# Patient Record
Sex: FEMALE | Race: BLACK OR AFRICAN AMERICAN | Employment: FULL TIME | URBAN - METROPOLITAN AREA
[De-identification: names, ages, dates, MRNs, and addresses within clinical notes are randomized per-mention and may not be internally consistent; named-entity substitution may affect disease eponyms.]

---

## 2019-07-02 LAB
A/G RATIO: 1 (ref 1.1–2.2)
ALBUMIN SERPL-MCNC: 4.1 G/DL (ref 3.4–5)
ALP BLD-CCNC: 40 U/L (ref 40–129)
ALT SERPL-CCNC: 15 U/L (ref 10–40)
ANION GAP SERPL CALCULATED.3IONS-SCNC: 14 MMOL/L (ref 3–16)
AST SERPL-CCNC: 22 U/L (ref 15–37)
BILIRUB SERPL-MCNC: 0.4 MG/DL (ref 0–1)
BUN BLDV-MCNC: 8 MG/DL (ref 7–20)
CALCIUM SERPL-MCNC: 9.6 MG/DL (ref 8.3–10.6)
CHLORIDE BLD-SCNC: 100 MMOL/L (ref 99–110)
CO2: 24 MMOL/L (ref 21–32)
CREAT SERPL-MCNC: 0.6 MG/DL (ref 0.6–1.1)
GFR AFRICAN AMERICAN: >60
GFR NON-AFRICAN AMERICAN: >60
GLOBULIN: 4 G/DL
GLUCOSE BLD-MCNC: 75 MG/DL (ref 70–99)
HCT VFR BLD CALC: 38.9 % (ref 36–48)
HEMOGLOBIN: 12.7 G/DL (ref 12–16)
MCH RBC QN AUTO: 28.6 PG (ref 26–34)
MCHC RBC AUTO-ENTMCNC: 32.7 G/DL (ref 31–36)
MCV RBC AUTO: 87.5 FL (ref 80–100)
PDW BLD-RTO: 13.3 % (ref 12.4–15.4)
PLATELET # BLD: 211 K/UL (ref 135–450)
PMV BLD AUTO: 10.7 FL (ref 5–10.5)
POTASSIUM SERPL-SCNC: 4.5 MMOL/L (ref 3.5–5.1)
RBC # BLD: 4.45 M/UL (ref 4–5.2)
SEDIMENTATION RATE, ERYTHROCYTE: 19 MM/HR (ref 0–20)
SODIUM BLD-SCNC: 138 MMOL/L (ref 136–145)
TOTAL PROTEIN: 8.1 G/DL (ref 6.4–8.2)
TSH SERPL DL<=0.05 MIU/L-ACNC: 0.65 UIU/ML (ref 0.27–4.2)
WBC # BLD: 8.6 K/UL (ref 4–11)

## 2019-07-05 LAB
HPV COMMENT: NORMAL
HPV TYPE 16: NOT DETECTED
HPV TYPE 18: NOT DETECTED
HPVOH (OTHER TYPES): NOT DETECTED

## 2019-07-29 ENCOUNTER — OFFICE VISIT (OUTPATIENT)
Dept: INTERNAL MEDICINE CLINIC | Age: 29
End: 2019-07-29
Payer: COMMERCIAL

## 2019-07-29 VITALS
WEIGHT: 215 LBS | DIASTOLIC BLOOD PRESSURE: 60 MMHG | HEART RATE: 88 BPM | HEIGHT: 67 IN | OXYGEN SATURATION: 98 % | SYSTOLIC BLOOD PRESSURE: 112 MMHG | BODY MASS INDEX: 33.74 KG/M2

## 2019-07-29 DIAGNOSIS — R63.5 ABNORMAL WEIGHT GAIN: Primary | ICD-10-CM

## 2019-07-29 DIAGNOSIS — Z23 NEED FOR TDAP VACCINATION: ICD-10-CM

## 2019-07-29 PROCEDURE — 90715 TDAP VACCINE 7 YRS/> IM: CPT | Performed by: INTERNAL MEDICINE

## 2019-07-29 PROCEDURE — 90471 IMMUNIZATION ADMIN: CPT | Performed by: INTERNAL MEDICINE

## 2019-07-29 PROCEDURE — 99203 OFFICE O/P NEW LOW 30 MIN: CPT | Performed by: INTERNAL MEDICINE

## 2019-07-29 RX ORDER — ONDANSETRON 4 MG/1
4 TABLET, ORALLY DISINTEGRATING ORAL
COMMUNITY
Start: 2018-06-25 | End: 2020-12-28

## 2019-07-29 RX ORDER — FLUTICASONE PROPIONATE 50 MCG
2 SPRAY, SUSPENSION (ML) NASAL
COMMUNITY
Start: 2015-10-02

## 2019-07-29 RX ORDER — OMEPRAZOLE 20 MG/1
CAPSULE, DELAYED RELEASE ORAL
COMMUNITY
Start: 2017-04-06 | End: 2021-04-29 | Stop reason: ALTCHOICE

## 2019-07-29 RX ORDER — PREDNISONE 10 MG/1
TABLET ORAL
COMMUNITY
Start: 2019-04-11 | End: 2020-03-02 | Stop reason: ALTCHOICE

## 2019-07-29 RX ORDER — NORGESTIMATE AND ETHINYL ESTRADIOL 7DAYSX3 28
KIT ORAL
COMMUNITY
Start: 2016-06-09 | End: 2021-10-29 | Stop reason: ALTCHOICE

## 2019-07-29 SDOH — HEALTH STABILITY: MENTAL HEALTH: HOW OFTEN DO YOU HAVE A DRINK CONTAINING ALCOHOL?: 2-4 TIMES A MONTH

## 2019-07-29 SDOH — HEALTH STABILITY: MENTAL HEALTH: HOW MANY STANDARD DRINKS CONTAINING ALCOHOL DO YOU HAVE ON A TYPICAL DAY?: 1 OR 2

## 2019-07-29 ASSESSMENT — PATIENT HEALTH QUESTIONNAIRE - PHQ9
SUM OF ALL RESPONSES TO PHQ QUESTIONS 1-9: 0
2. FEELING DOWN, DEPRESSED OR HOPELESS: 0
1. LITTLE INTEREST OR PLEASURE IN DOING THINGS: 0
SUM OF ALL RESPONSES TO PHQ QUESTIONS 1-9: 0
SUM OF ALL RESPONSES TO PHQ9 QUESTIONS 1 & 2: 0

## 2019-07-31 ASSESSMENT — ENCOUNTER SYMPTOMS
VOMITING: 0
COUGH: 0
CONSTIPATION: 0
CHEST TIGHTNESS: 0
ABDOMINAL PAIN: 0
EYE REDNESS: 0
DIARRHEA: 0
SORE THROAT: 0
NAUSEA: 0
RHINORRHEA: 0
BACK PAIN: 0
SHORTNESS OF BREATH: 0
WHEEZING: 0
SINUS PRESSURE: 0

## 2020-01-14 ENCOUNTER — TELEPHONE (OUTPATIENT)
Dept: INTERNAL MEDICINE CLINIC | Age: 30
End: 2020-01-14

## 2020-01-30 ENCOUNTER — OFFICE VISIT (OUTPATIENT)
Dept: INTERNAL MEDICINE CLINIC | Age: 30
End: 2020-01-30
Payer: COMMERCIAL

## 2020-01-30 VITALS
BODY MASS INDEX: 33.67 KG/M2 | HEART RATE: 87 BPM | DIASTOLIC BLOOD PRESSURE: 80 MMHG | SYSTOLIC BLOOD PRESSURE: 120 MMHG | WEIGHT: 215 LBS | OXYGEN SATURATION: 98 %

## 2020-01-30 LAB
CONTROL: NORMAL
PREGNANCY TEST URINE, POC: NEGATIVE

## 2020-01-30 PROCEDURE — 99395 PREV VISIT EST AGE 18-39: CPT | Performed by: INTERNAL MEDICINE

## 2020-01-30 PROCEDURE — 99213 OFFICE O/P EST LOW 20 MIN: CPT | Performed by: INTERNAL MEDICINE

## 2020-01-30 PROCEDURE — 81025 URINE PREGNANCY TEST: CPT | Performed by: INTERNAL MEDICINE

## 2020-01-30 ASSESSMENT — PATIENT HEALTH QUESTIONNAIRE - PHQ9
SUM OF ALL RESPONSES TO PHQ QUESTIONS 1-9: 0
1. LITTLE INTEREST OR PLEASURE IN DOING THINGS: 0
SUM OF ALL RESPONSES TO PHQ9 QUESTIONS 1 & 2: 0
2. FEELING DOWN, DEPRESSED OR HOPELESS: 0
SUM OF ALL RESPONSES TO PHQ QUESTIONS 1-9: 0

## 2020-01-30 NOTE — PATIENT INSTRUCTIONS
Sinus headaches  Avoid dairy! It is in everything, so read labels   Start Zyrtec-D as needed    Breakthrough bleeding  Start your new pack as usual        Breast mass  Check mammogram and ultrasound  Refer to breast surgery  To schedule your test call:  [facility]  Mon.-Fri. 7 a.m.- 8 p.m., Sat 8a. m.- 3 p.m. Yadi LAMAR ([phone])  Patients: Press Option 2  (a) To schedule a test, procedure or class- Press Option 1.

## 2020-02-14 DIAGNOSIS — Z13.1 SCREENING FOR DIABETES MELLITUS: ICD-10-CM

## 2020-02-14 DIAGNOSIS — Z13.220 SCREENING, LIPID: ICD-10-CM

## 2020-02-14 LAB
CHOLESTEROL, TOTAL: 180 MG/DL (ref 0–199)
GLUCOSE BLD-MCNC: 85 MG/DL (ref 70–99)
HDLC SERPL-MCNC: 63 MG/DL (ref 40–60)
LDL CHOLESTEROL CALCULATED: 100 MG/DL
TRIGL SERPL-MCNC: 86 MG/DL (ref 0–150)
VLDLC SERPL CALC-MCNC: 17 MG/DL

## 2020-02-17 ENCOUNTER — HOSPITAL ENCOUNTER (OUTPATIENT)
Dept: ULTRASOUND IMAGING | Age: 30
Discharge: HOME | End: 2020-02-17
Payer: COMMERCIAL

## 2020-02-17 ENCOUNTER — HOSPITAL ENCOUNTER (OUTPATIENT)
Dept: WOMENS IMAGING | Age: 30
Discharge: HOME | End: 2020-02-17
Payer: COMMERCIAL

## 2020-02-17 PROCEDURE — 76641 ULTRASOUND BREAST COMPLETE: CPT

## 2020-03-02 ENCOUNTER — OFFICE VISIT (OUTPATIENT)
Dept: SURGERY | Age: 30
End: 2020-03-02
Payer: COMMERCIAL

## 2020-03-02 VITALS
BODY MASS INDEX: 34.21 KG/M2 | WEIGHT: 218 LBS | SYSTOLIC BLOOD PRESSURE: 119 MMHG | OXYGEN SATURATION: 99 % | HEART RATE: 79 BPM | HEIGHT: 67 IN | DIASTOLIC BLOOD PRESSURE: 68 MMHG

## 2020-03-02 PROCEDURE — 99243 OFF/OP CNSLTJ NEW/EST LOW 30: CPT | Performed by: NURSE PRACTITIONER

## 2020-03-02 NOTE — PROGRESS NOTES
 Cancer Maternal Grandmother     Stroke Maternal Grandmother     High Cholesterol Maternal Grandmother        Allergies as of 2020 - Review Complete 2020   Allergen Reaction Noted    Azithromycin Shortness Of Breath 2017    Ibuprofen Nausea And Vomiting 2010       Social History     Tobacco Use    Smoking status: Never Smoker    Smokeless tobacco: Never Used   Substance Use Topics    Alcohol use: Yes     Alcohol/week: 2.0 standard drinks     Types: 2 Glasses of wine per week     Frequency: 2-4 times a month     Drinks per session: 1 or 2     Binge frequency: Never    Drug use: Never       Current Outpatient Medications on File Prior to Visit   Medication Sig Dispense Refill    Norgestim-Eth Estrad Triphasic 0.18/0.215/0.25 MG-35 MCG TABS Take by mouth      fluticasone (FLONASE) 50 MCG/ACT nasal spray 2 sprays by Nasal route      ondansetron (ZOFRAN-ODT) 4 MG disintegrating tablet Take 4 mg by mouth      omeprazole (PRILOSEC) 20 MG delayed release capsule Take one tablet one hour before dinner then one tablet one hour before breakfast       No current facility-administered medications on file prior to visit. Medication documentation has been reviewed in the electronic medical record and patient office intake form. Family History:  No family history of breast or ovarian cancer. Hormonal History:   a.0  m.0  Menarche at age 8.   +OCP use for 8 years       REVIEW OF SYSTEMS:  Constitutional: Negative for fever, + fatigue  HENT: Negative for sore throat, + environmental allergies, + food allergies, + headache, + congestion, + postnasal drip, + rhinorrhea, + sinus pressure, + sinus pain, + sneezing  Eyes: Negative for redness   Respiratory: Negative for dyspnea, + cough  Cardiovascular: Negative for chest pain, + chest tightness  Gastrointestinal: Negative for vomiting, diarrhea, + constipation, + nausea  Genitourinary: Negative for hematuria   Musculoskeletal: + For arthralgias  Skin: Negative for rash  Neurological: Negative for syncope  Hematological: Negative for adenopathy  Psychiatric/Behavorial: + For anxiety, + agitation, + sleep disturbance      PHYSICAL EXAM:  /68   Pulse 79   Ht 5' 7\" (1.702 m)   Wt 218 lb (98.9 kg)   SpO2 99%   BMI 34.14 kg/m²   Constitutional: She appearswell-nourished. No apparent distress. Breast: The patient was examined in the upright and supine position. She has a \"DDD\" cup breast. Breasts are symmetrically ptotic. Right: well healed periareolar scar and post-surgical changes. No new masses or changes in breast contour. No skin changes of the breast or nipple areolar complex. No nipple inversion or discharge. No erythema, thickening (peau d'orange), or dimpling. Scattered areas of thickened fibroglandular tissue lateral portion of breast, associated tenderness. Left: No new masses or changes in breast contour. No skin changes of the breast or nipple areolar complex. No nipple inversion or discharge. No erythema, thickening (peau d'orange), or dimpling. There is no axillary lymphadenopathy palpated bilaterally. Head: Normocephalic and atraumatic:   Eyes: EOM are normal. Pupils are equal, round, and reactive to light. Neck: Neck supple. No tracheal deviation present. No obvious mass. Cardiovascular: regular rate. Pulmonary: No accessory muscle use. Respirations non-labored and no wheezing. Lymphatics: No palpable supraclavicular, cervical, or axillary lymphadenopathy  Skin: No rash noted. No erythema. Neurologic: alert and oriented. Extremities: appear well perfused. No edema. No joint deformity         Risk assessment using WILLY Breast Cancer Risk Evaluation Tool to evaluate her risk compared to the general population. Her ten year risk of breast cancer is 2.3% (general population average 1.6%). Her lifetime risk for breast cancer is 17.9% (general population average 12.9%).           ASSESSMENT:

## 2020-03-12 ENCOUNTER — OFFICE VISIT (OUTPATIENT)
Dept: INTERNAL MEDICINE CLINIC | Age: 30
End: 2020-03-12
Payer: COMMERCIAL

## 2020-03-12 VITALS
HEART RATE: 98 BPM | BODY MASS INDEX: 33.99 KG/M2 | DIASTOLIC BLOOD PRESSURE: 72 MMHG | SYSTOLIC BLOOD PRESSURE: 128 MMHG | WEIGHT: 217 LBS

## 2020-03-12 PROBLEM — F51.05 INSOMNIA DUE TO OTHER MENTAL DISORDER: Status: ACTIVE | Noted: 2020-03-12

## 2020-03-12 PROBLEM — F99 INSOMNIA DUE TO OTHER MENTAL DISORDER: Status: ACTIVE | Noted: 2020-03-12

## 2020-03-12 PROCEDURE — 99214 OFFICE O/P EST MOD 30 MIN: CPT | Performed by: INTERNAL MEDICINE

## 2020-03-12 RX ORDER — TRAZODONE HYDROCHLORIDE 50 MG/1
25-50 TABLET ORAL NIGHTLY
Qty: 90 TABLET | Refills: 1 | Status: SHIPPED | OUTPATIENT
Start: 2020-03-12 | End: 2020-04-24 | Stop reason: DRUGHIGH
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
## 2020-03-12 NOTE — PATIENT INSTRUCTIONS
Start Trazodone 30-45 minutes before bed    Patient Education        fluoxetine  Pronunciation:  floo OX e teen  Brand:  PROzac, PROzac Weekly, Sarafem  What is the most important information I should know about fluoxetine? You should not use fluoxetine if you also take pimozide or thioridazine, or if you are being treated with methylene blue injection. Do not use this medicine if you have used an MAO inhibitor in the past 14 days, such as isocarboxazid, linezolid, methylene blue injection, phenelzine, rasagiline, selegiline, or tranylcypromine. You must wait at least 14 days after stopping an MAO inhibitor before you can take fluoxetine. You must wait 5 weeks after stopping fluoxetine before you can take thioridazine or an MAOI. Some young people have thoughts about suicide when first taking an antidepressant. Stay alert to changes in your mood or symptoms. Report any new or worsening symptoms to your doctor. What is fluoxetine? Fluoxetine is a selective serotonin reuptake inhibitors (SSRI) antidepressant. Fluoxetine affects chemicals in the brain that may be unbalanced in people with depression, panic, anxiety, or obsessive-compulsive symptoms. Fluoxetine is used to treat major depressive disorder, bulimia nervosa (an eating disorder) obsessive-compulsive disorder, panic disorder, and premenstrual dysphoric disorder (PMDD). Fluoxetine is sometimes used together with another medication called olanzapine (Zyprexa) to treat manic depression caused by bipolar disorder. This combination is also used to treat depression after at least 2 other medications have been tried without successful treatment of symptoms. If you also take olanzapine (Zyprexa), read the Zyprexa medication guide and all patient warnings and instructions provided with that medication. Fluoxetine may also be used for purposes not listed in this medication guide. What should I discuss with my healthcare provider before taking fluoxetine?   Do of this information.

## 2020-03-13 ASSESSMENT — ENCOUNTER SYMPTOMS
CONSTIPATION: 0
SORE THROAT: 0
BACK PAIN: 0
NAUSEA: 0
CHEST TIGHTNESS: 0
SINUS PRESSURE: 0
DIARRHEA: 0
RHINORRHEA: 0
COUGH: 0
ABDOMINAL PAIN: 0
SHORTNESS OF BREATH: 0
EYE REDNESS: 0
WHEEZING: 0
VOMITING: 0

## 2020-04-24 ENCOUNTER — VIRTUAL VISIT (OUTPATIENT)
Dept: INTERNAL MEDICINE CLINIC | Age: 30
End: 2020-04-24
Payer: COMMERCIAL

## 2020-04-24 PROCEDURE — 99213 OFFICE O/P EST LOW 20 MIN: CPT | Performed by: INTERNAL MEDICINE

## 2020-04-24 RX ORDER — TRAZODONE HYDROCHLORIDE 100 MG/1
100 TABLET ORAL NIGHTLY
Qty: 30 TABLET | Refills: 5 | Status: SHIPPED | OUTPATIENT
Start: 2020-04-24 | End: 2020-10-26

## 2020-04-24 ASSESSMENT — ENCOUNTER SYMPTOMS
SINUS PRESSURE: 0
SHORTNESS OF BREATH: 0
COUGH: 0
NAUSEA: 0
WHEEZING: 0
EYE REDNESS: 0
CHEST TIGHTNESS: 0
ABDOMINAL PAIN: 0
RHINORRHEA: 0
BACK PAIN: 0
CONSTIPATION: 0
DIARRHEA: 0
VOMITING: 0
SORE THROAT: 0

## 2020-04-24 NOTE — PROGRESS NOTES
4/24/2020    TELEHEALTH EVALUATION -- Audio/Visual (During WNLKN-79 public health emergency)    [address] [facility]  [address]  Phone: [phone]           Patient Name: Nic Mora    YOB: 1990    Today's Date: 4/24/20     Cristobal Isaac gave consent to participate in an audio/visual visit    Present at visit: patient alone       Chief Complaint   Patient presents with    Insomnia          Subjective: Insomnia  Continues to work with a psychotherapist, however due to pandemic she sees her every 3 weeks. She takes the trazodone around 9:30. She continues to toss and turn for a while. She initially did 25 mg but then increased it to 50 mg. Once she falls asleep she does feel that she sleeps more deeply. She is dreaming more. However she still wakes up throughout the night. She denies any panic attacks. She limits screen time in the evenings        History:     History reviewed. No pertinent past medical history. Current Outpatient Medications on File Prior to Visit   Medication Sig Dispense Refill    Norgestim-Eth Estrad Triphasic 0.18/0.215/0.25 MG-35 MCG TABS Take by mouth      fluticasone (FLONASE) 50 MCG/ACT nasal spray 2 sprays by Nasal route      ondansetron (ZOFRAN-ODT) 4 MG disintegrating tablet Take 4 mg by mouth      omeprazole (PRILOSEC) 20 MG delayed release capsule Take one tablet one hour before dinner then one tablet one hour before breakfast       No current facility-administered medications on file prior to visit. Social History     Tobacco Use    Smoking status: Never Smoker    Smokeless tobacco: Never Used   Substance Use Topics    Alcohol use:  Yes     Alcohol/week: 2.0 standard drinks     Types: 2 Glasses of wine per week     Frequency: 2-4 times a month     Drinks per session: 1 or 2     Binge frequency: Never         Review of Systems:    Review of Systems   Constitutional: Negative for fatigue

## 2020-06-11 ENCOUNTER — TELEPHONE (OUTPATIENT)
Dept: INTERNAL MEDICINE CLINIC | Age: 30
End: 2020-06-11

## 2020-06-16 ENCOUNTER — OFFICE VISIT (OUTPATIENT)
Dept: INTERNAL MEDICINE CLINIC | Age: 30
End: 2020-06-16
Payer: COMMERCIAL

## 2020-06-16 VITALS
SYSTOLIC BLOOD PRESSURE: 112 MMHG | DIASTOLIC BLOOD PRESSURE: 80 MMHG | WEIGHT: 212 LBS | TEMPERATURE: 97.1 F | OXYGEN SATURATION: 98 % | BODY MASS INDEX: 33.2 KG/M2 | HEART RATE: 81 BPM

## 2020-06-16 PROCEDURE — 99213 OFFICE O/P EST LOW 20 MIN: CPT | Performed by: INTERNAL MEDICINE

## 2020-06-16 NOTE — PROGRESS NOTES
[address]  Phone: [phone]           Patient Name: Shiv Castle    YOB: 1990    Today's Date: 6/16/20           Chief Complaint   Patient presents with    Knee Pain     right flaquita/ popped when working out last week while working out          Subjective:  Taking a step down and right knee popped backwards. No swelling  She had to limp. The next day she was unable to stand after sitting for a couple of hours    Knee Pain    The incident occurred more than 1 week ago (2 weeks ago). The incident occurred at home. The pain is present in the right knee. The pain is moderate. The pain has been constant since onset. Associated symptoms include a loss of motion. Pertinent negatives include no inability to bear weight. She reports no foreign bodies present. The symptoms are aggravated by weight bearing. She has tried NSAIDs for the symptoms. The treatment provided mild relief. History:     No past medical history on file. Current Outpatient Medications on File Prior to Visit   Medication Sig Dispense Refill    traZODone (DESYREL) 100 MG tablet Take 1 tablet by mouth nightly 30 tablet 5    Norgestim-Eth Estrad Triphasic 0.18/0.215/0.25 MG-35 MCG TABS Take by mouth      fluticasone (FLONASE) 50 MCG/ACT nasal spray 2 sprays by Nasal route      ondansetron (ZOFRAN-ODT) 4 MG disintegrating tablet Take 4 mg by mouth      omeprazole (PRILOSEC) 20 MG delayed release capsule Take one tablet one hour before dinner then one tablet one hour before breakfast       No current facility-administered medications on file prior to visit. Social History     Tobacco Use    Smoking status: Never Smoker    Smokeless tobacco: Never Used   Substance Use Topics    Alcohol use:  Yes     Alcohol/week: 2.0 standard drinks     Types: 2 Glasses of wine per week     Frequency: 2-4 times a month     Drinks per session: 1 or 2 Binge frequency: Never         Review of Systems:    Review of Systems   All other systems reviewed and are negative. Objective:    Vitals:    06/16/20 1004   BP: 112/80   Pulse: 81   Temp: 97.1 °F (36.2 °C)   TempSrc: Infrared   SpO2: 98%   Weight: 212 lb (96.2 kg)     Wt Readings from Last 3 Encounters:   06/16/20 212 lb (96.2 kg)   03/12/20 217 lb (98.4 kg)   03/02/20 218 lb (98.9 kg)       Body mass index is 33.2 kg/m². Physical Exam  Constitutional:       General: She is not in acute distress. Appearance: She is obese. She is not ill-appearing. Musculoskeletal:      Right knee: She exhibits decreased range of motion, swelling, effusion and MCL laxity. She exhibits normal patellar mobility. Tenderness found. Medial joint line tenderness noted. Left knee: Normal.   Neurological:      Mental Status: She is alert. Assessment:    1. Acute pain of right knee  Patient with right knee pain, effusion, laxity of the MCL. Concern for MCL or meniscal injury. Recommend referral to Ortho. Recommend MRI to evaluate meniscus and MCL  - MRI KNEE RIGHT WO CONTRAST; Future  - [address] [facility] Janice Calixto 2, MD, Orthopedic Surgery, [facility]    2. Injury of meniscus of right knee, initial encounter    - MRI KNEE RIGHT WO CONTRAST; Future  - [address] Jan[address] Lazo 2, MD, Orthopedic Surgery, [facility]    3. Instability of MCL    - MRI KNEE RIGHT WO CONTRAST; Future  - [address] [facility] Janice Calixto 2, MD, Orthopedic Surgery, [facility]        Plan/Patient Instructions:    Patient Instructions   Check MRI  Refer to ortho  To schedule your test call:  [facility]  Mon.-Fri. 7 a.m.- 8 p.m., Sat 8a. m.- 3 p.m. [address] [place] [address] ([phone])  Patients: Press Option 2  (a) To schedule a test, procedure or class- Press Option 1. Rest  Ice   Compresion  Elevation     You can take naproxen or tylenol for pain        Return in about 3 months (around 9/16/2020) for Insomnia .        [address] Documentation was done using voice recognition dragon software. Every effort was made to ensure accuracy; however, inadvertent, unintentional computerized transcription errors may be present.

## 2020-06-17 ENCOUNTER — TELEPHONE (OUTPATIENT)
Dept: INTERNAL MEDICINE CLINIC | Age: 30
End: 2020-06-17

## 2020-06-18 ENCOUNTER — TELEPHONE (OUTPATIENT)
Dept: INTERNAL MEDICINE CLINIC | Age: 30
End: 2020-06-18

## 2020-06-18 NOTE — TELEPHONE ENCOUNTER
[address] needs office notes for the referral that was submitted for MRI of pts knee?   The fax number is  [phone]

## 2020-06-24 NOTE — TELEPHONE ENCOUNTER
[facility] received a call from: Juanita Hallman    Name of Caller: Paty Mckeon    Relationship to patient:Self    Organization name: na    Best contact number: [phone]    Reason for call: PT now needs a PA, please call:  [facility] specialty services [phone]

## 2020-06-30 ENCOUNTER — TELEPHONE (OUTPATIENT)
Dept: INTERNAL MEDICINE CLINIC | Age: 30
End: 2020-06-30

## 2020-07-02 NOTE — TELEPHONE ENCOUNTER
I informed patient and she needs to do the MRI per Dr. Gillis Boast office so she spoke with [facility] and they will do it without her insurance. After that is completed she will schedule with Ortho.

## 2020-07-02 NOTE — TELEPHONE ENCOUNTER
Attempted to peer to peer with no success.  Ask her to schedule with ortho- I referred her to Marylou Harkins MD

## 2020-07-10 ENCOUNTER — OFFICE VISIT (OUTPATIENT)
Dept: ORTHOPEDIC SURGERY | Age: 30
End: 2020-07-10
Payer: COMMERCIAL

## 2020-07-10 VITALS — HEIGHT: 67 IN | TEMPERATURE: 97.7 F | BODY MASS INDEX: 33.29 KG/M2 | WEIGHT: 212.08 LBS

## 2020-07-10 PROCEDURE — 99204 OFFICE O/P NEW MOD 45 MIN: CPT | Performed by: ORTHOPAEDIC SURGERY

## 2020-07-10 NOTE — PROGRESS NOTES
Date:  7/10/2020    Name:  Florentin Gonzalez  Address:  [address] [place] [id number]    :  1990      Age:   27 y.o.    SSN:  [id number]      Medical Record Number:  [id number]    Reason for Visit:    Chief Complaint    New Patient (Right Knee)      DOS:7/10/2020     HPI: Daniel Munoz is a 27 y.o. female here today for evaluation of the right knee. She is a 72-year-old . About a month ago she had started a new workout routine and was having increasing pain in the right knee. She felt increased pain while taking a step off her porch when her knee kicked backwards. Pain was anterior and medial, sharp, nonradiating, without any locking but does notice some catching as she moves in and out of flexion. She has not had any prior knee injuries. Her pain is gradually been improving with some rest.  She presents with an MRI today for initial evaluation. She denies any fevers, chills, chest pain, shortness of breath, numbness, tingling or any other new symptoms. ROS: Pertinent items are noted in HPI. No past medical history on file. Past Surgical History:   Procedure Laterality Date    BREAST LUMPECTOMY      Benign     CYST REMOVAL      WISDOM TOOTH EXTRACTION         Family History   Problem Relation Age of Onset    Hypertension Mother    Jamshid Ni Other Mother         Fibroid    No Known Problems Father     Other Half-Sister         Short stature     Diabetes Maternal Grandmother     Cancer Maternal Grandmother     Stroke Maternal Grandmother     High Cholesterol Maternal Grandmother        Social History     Tobacco Use    Smoking status: Never Smoker    Smokeless tobacco: Never Used   Substance Use Topics    Alcohol use:  Yes     Alcohol/week: 2.0 standard drinks     Types: 2 Glasses of wine per week     Frequency: 2-4 times a month     Drinks per session: 1 or 2     Binge frequency: Never    Drug use: Never       Current Outpatient Medications   Medication Sig Dispense Refill    traZODone (DESYREL) 100 MG tablet Take 1 tablet by mouth nightly 30 tablet 5    Norgestim-Eth Estrad Triphasic 0.18/0.215/0.25 MG-35 MCG TABS Take by mouth      fluticasone (FLONASE) 50 MCG/ACT nasal spray 2 sprays by Nasal route      ondansetron (ZOFRAN-ODT) 4 MG disintegrating tablet Take 4 mg by mouth      omeprazole (PRILOSEC) 20 MG delayed release capsule Take one tablet one hour before dinner then one tablet one hour before breakfast       No current facility-administered medications for this visit. Allergies   Allergen Reactions    Azithromycin Shortness Of Breath    Ibuprofen Nausea And Vomiting           Physical Exam:  Temp 97.7 °F (36.5 °C)   Ht 5' 7.01\" (1.702 m)   Wt 212 lb 1.3 oz (96.2 kg)   BMI 33.21 kg/m²     General: No acute distress, well nourished  CV: No obvious peripheral edema. Normal peripheral pulses  Neuro: Alert & oriented x 3  Psych: Normal mood and affect           Right Knee Exam:   Overall alignment is appreciated. Valgus     Gait/Alignment: No use of assistive devices. Patella tracking: No J sign. Q angle ~20, 1 quadrant mobility     Inspection/Skin: Skin is intact without erythema or ecchymosis. No significant swelling. No deformity. Effusion; none. Palpation: Tender at the medial and lateral patellar facets as well as proximal patellar tendon. Pain with patellar grind. Tender over the proximal MCL. Patellar catching through mid ROM. Non-tender to the medial or lateral joint line. No fullness in the popliteal fossa. Calf compartments are soft and non-tender. No signs of DVT. Range of Motion: From slight hyperextension to 130 degrees of flexion without pain. Internal and external rotation of the hip are maintained without pain or deficit. Strength: 5/5 strength of the quadriceps and hamstrings. Ligamentous Stability: painless Stable to valgus and varus stress testing at 0° and 30°.  Gabbie's does not reproduce pain. Lachman's has a solid endpoint. Neurologic and vascular: Intact sensation to light touch in all 5 digits. 2+ palpable pedal pulses. Comparison Left Knee Exam:   Overall alignment is appreciated. Valgus     Gait/Alignment: No use of assistive devices. Patella tracking: No J sign. 1 quadrant lateral mobility, Q angle 20     Inspection/Skin: Skin is intact without erythema or ecchymosis. No significant swelling. No deformity. Effusion; none. Palpation: Non-tender to the medial or lateral joint line. No fullness in the popliteal fossa. No pain with patellar grind testing. Non-tender to the medial or lateral patellar facets. Non-tender to medial and lateral collateral ligaments. No significant Patellofemoral crepitus. Calf compartments are soft and non-tender. No signs of DVT. Range of Motion: From slight hyperextension to about 130 degrees of flexion without pain. Internal and external rotation of the hip are maintained without pain or deficit. Strength: 5/5 strength of the quadriceps and hamstrings. Ligamentous Stability: Stable to valgus and varus stress testing at 0° and 30°. Gabbie's does not reproduce pain. Lachman's has a solid endpoint. Neurologic and vascular: Intact sensation to light touch in all 5 digits. 2+ palpable pedal pulses. Diagnostics:  Radiology:     X-rays of the right knee including bilateral AP, bilateral Tunnel, Bilateral Merchant and a lateral were obtained and reviewed in office:    Impression: no fracture, dislocation, subluxation. Left lateral patellar tilt. Right is well centered       Assessment:   Right patellar malalignment    Plan:   Pertinent imaging was reviewed. The etiology, natural history, and treatment options for the disorder were discussed. The roles of activity modifications, medications, injections, physical therapy, and surgical interventions were all described to the patient and questions were answered.       PT prescribed  Recommend ice, compression and rest.   Does not tolerate NSAIDs  Follow up in 1 month      Orders Placed This Encounter   Procedures    XR KNEE RIGHT (MIN 4 VIEWS)     Standing Status:   Future     Number of Occurrences:   1     Standing Expiration Date:   7/9/2021     Order Specific Question:   Reason for exam:     Answer:   knee pain    XR KNEE LEFT (3 VIEWS)     Standing Status:   Future     Number of Occurrences:   1     Standing Expiration Date:   7/9/2021     Order Specific Question:   Reason for exam:     Answer:   knee pain           ARCHIE Rosenberg 484     07/10/20  9:39 AM    The encounter with Enzo Mandaen was supervised by Dr Yossi Hough who personally examined the patient and reviewed the plan. This dictation was performed with a verbal recognition program (DRAGON) and it was checked for errors. It is possible that there are still dictated errors within this office note. If so, please bring any errors to my attention for an addendum. All efforts were made to ensure that this office note is accurate. I personally reviewed the patient's pain scale, review of systems, family history, social history, past medical history, allergies and medications. Review of systems was collected today, reviewed and is included in the medical record. It is available under the media tab. I personally performed and or supervised the services described in this documentation and scribed by the sports medicine fellow. Akanksha Shearer MD  Sports Medicine, Arthroscopic Knee and Shoulder Surgery    This dictation was performed with a verbal recognition program [facility]) and it was checked for errors. It is possible that there are still dictated errors within this office note. If so, please bring any errors to my attention for an addendum. All efforts were made to ensure that this office note is accurate.

## 2020-07-15 ENCOUNTER — HOSPITAL ENCOUNTER (OUTPATIENT)
Dept: PHYSICAL THERAPY | Age: 30
Setting detail: THERAPIES SERIES
Discharge: HOME | End: 2020-07-15
Payer: COMMERCIAL

## 2020-07-15 PROCEDURE — 97161 PT EVAL LOW COMPLEX 20 MIN: CPT | Performed by: PHYSICAL THERAPIST

## 2020-07-15 PROCEDURE — 97110 THERAPEUTIC EXERCISES: CPT | Performed by: PHYSICAL THERAPIST

## 2020-07-15 NOTE — PLAN OF CARE
[place], [address] [facility], [address]  Phone: [phone]   Fax: [phone]                                                       Physical Therapy Certification    Dear Referring Practitioner: Dr. Lexy Finney,    We had the pleasure of evaluating the following patient for physical therapy services at [address] [place] [address]. A summary of our findings can be found in the initial assessment below. This includes our plan of care. If you have any questions or concerns regarding these findings, please do not hesitate to contact me at the office phone number checked above. Thank you for the referral.       Physician Signature:_______________________________Date:__________________  By signing above (or electronic signature), therapists plan is approved by physician      Patient: Caryle Listen   : 1990   MRN: [id number]  Referring Physician: Referring Practitioner: Dr. Lexy Finney      Evaluation Date: 7/15/2020      Medical Diagnosis Information:  Diagnosis: M23.91 (ICD-10-CM) - Patellar malalignment syndrome of right knee   Treatment Diagnosis: R knee pain, quad atrophy, hamstring tightness                                         Insurance information: PT Insurance Information: [place]     Precautions/ Contra-indications: none  Latex Allergy:  [x]NO      []YES  Preferred Language for Healthcare:   [x]English       []other:    SUBJECTIVE: Patient stated complaint: early  pt. Was coming off her porch steps and felt a pop in her knee causing immediate pain and swelling, especially medial side of her knee; since then knee has improved; pt. Continues to have difficulty with stairs ascending > descending; pt. Reports loss of strength and motion; pt.  Has pain at the end of the day when she has been on her feet more throughout the day; initially sleep disturbances but not currently; pt. Has not had an injection; pt.  Wearing a knee brace when she is going to be on her feet more    Relevant Medical History: anxiety, depression  Functional Outcome: LEFS: raw score = 61; dysfunction = 24%    Pain Scale: 5/10 average, 8/10 worst  Easing factors: elevate, rest, ice  Provocative factors: stairs, prolonged standing/walking    Type: [x]Constant   []Intermittent  []Radiating [x]Localized - inferior and medial to patella []other:     Numbness/Tingling: not currently    Occupation/School:     Living Status/Prior Level of Function:Prior to this injury / incident, pt was independent with ADLs and IADLs, working out - body weight training      OBJECTIVE:   Palpation: tenderness medial jt. line    Functional Mobility/Transfers: independent    Posture: guarded posture of R knee, weight shift to L in standing    Bandages/Dressings/Incisions: n/a    Gait: (include devices/WB status) FWB without AD, slight antalgic gait and extensor lag    Dermatomes Normal Abnormal Comments   inguinal area (L1)    NT   anterior mid-thigh (L2) x     distal ant thigh/med knee (L3) x     medial lower leg and foot (L4) x     lateral lower leg and foot (L5) x     posterior calf (S1) x     medial calcaneus (S2) x            PROM AROM    L R L R   Knee Flexion 133 120 pain 126 107 pain   Knee Extension 0 -5 pain         Strength (0-5) / Myotomes Left Right   Hip Flexion - seated (L1-2) 4+ 4 pain   Hip Abduction 4 4 pain   Quads (L2-4) 5 4+   Hamstrings 4+ 4 pain        Flexibility     Hamstrings (90/90) -25 -45        Girth (cm)     Mid patella 43.0 43.5   Suprapatellar 48.0 47.5       Joint mobility: patellofemoral   [x]Normal    []Hypo   []Hyper    Orthopaedic Special Tests  Positive  Negative  NT Comments    Knee       Lachman's / Anterior Drawer x      Posterior Drawer  x     Varus Stress  x     Valgus Stress x   Pain but no gapping   Gabbie's   x     Appley's   x    Thessaly's   x    Patellar Tracking   x Balance: NT                         [x] Patient history, allergies, meds reviewed. Medical chart reviewed. See intake form. Review Of Systems (ROS):  [x]Performed Review of systems (Integumentary, CardioPulmonary, Neurological) by intake and observation. Intake form has been scanned into medical record. Patient has been instructed to contact their primary care physician regarding ROS issues if not already being addressed at this time. Co-morbidities/Complexities (which will affect course of rehabilitation):   []None           Arthritic conditions   []Rheumatoid arthritis (M05.9)  []Osteoarthritis (M19.91)   Cardiovascular conditions   []Hypertension (I10)  []Hyperlipidemia (E78.5)  []Angina pectoris (I20)  []Atherosclerosis (I70)   Musculoskeletal conditions   []Disc pathology   []Congenital spine pathologies   []Prior surgical intervention  []Osteoporosis (M81.8)  []Osteopenia (M85.8)   Endocrine conditions   []Hypothyroid (E03.9)  []Hyperthyroid Gastrointestinal conditions   []Constipation (I45.28)   Metabolic conditions   []Morbid obesity (E66.01)  []Diabetes type 1(E10.65) or 2 (E11.65)   []Neuropathy (G60.9)     Pulmonary conditions   []Asthma (J45)  []Coughing   []COPD (J44.9)   Psychological Disorders  [x]Anxiety (F41.9)  [x]Depression (F32.9)   []Other:   []Other:          Barriers to/and or personal factors that will affect rehab potential:              []Age  []Sex    []Smoker              []Motivation/Lack of Motivation                        [x]Co-Morbidities              []Cognitive Function, education/learning barriers              []Environmental, home barriers              [x]profession/work barriers  []past PT/medical experience  []other:  Justification:     Falls Risk Assessment (30 days):   [x] Falls Risk assessed and no intervention required.   [] Falls Risk assessed and Patient requires intervention due to being higher risk   TUG score (>12s at risk):     [] Falls education provided, including        ASSESSMENT:   Functional Impairments:     []Noted lumbar/proximal hip/LE joint hypomobility   [x]Decreased LE functional ROM   [x]Decreased core/proximal hip strength and neuromuscular control   [x]Decreased LE functional strength   [x]Reduced balance/proprioceptive control   []other:      Functional Activity Limitations (from functional questionnaire and intake)   []Reduced ability to tolerate prolonged functional positions   []Reduced ability or difficulty with changes of positions or transfers between positions   []Reduced ability to maintain good posture and demonstrate good body mechanics with sitting, bending, and lifting   []Reduced ability to sleep   [x] Reduced ability or tolerance with driving and/or computer work   [x]Reduced ability to perform lifting, carrying tasks   [x]Reduced ability to squat   [x]Reduced ability to forward bend   [x]Reduced ability to ambulate prolonged functional periods/distances/surfaces   [x]Reduced ability to ascend/descend stairs   [x]Reduced ability to run, hop, cut or jump   []other:    Participation Restrictions   []Reduced participation in self care activities   [x]Reduced participation in home management activities   [x]Reduced participation in work activities   [x]Reduced participation in social activities. [x]Reduced participation in sport/recreation activities. Classification :    []Signs/symptoms consistent with post-surgical status including decreased ROM, strength and function.    []Signs/symptoms consistent with joint sprain/strain   [x]Signs/symptoms consistent with patella-femoral syndrome   []Signs/symptoms consistent with knee OA/hip OA   []Signs/symptoms consistent with internal derangement of knee/Hip   [x]Signs/symptoms consistent with functional hip weakness/NMR control      []Signs/symptoms consistent with tendinitis/tendinosis    []signs/symptoms consistent with pathology which may benefit from Dry needling      []other:

## 2020-07-15 NOTE — FLOWSHEET NOTE
[facility]i [facility]  Phone: [phone]   Fax: [phone]    Physical Therapy Treatment Note/ Progress Report:     Date:  7/15/2020    Patient Name:  Oksana Orona    :  1990  MRN: [id number]  Restrictions/Precautions:    Medical/Treatment Diagnosis Information:  Diagnosis: M23.91 (ICD-10-CM) - Patellar malalignment syndrome of right knee  Treatment Diagnosis: R knee pain, quad atrophy, hamstring tightness  Insurance/Certification information:  PT Insurance Information: [place]  Physician Information:  Referring Practitioner: Dr. Stephan Ross of care signed (Y/N): []  Yes [x]  No     Date of Patient follow up with Physician: 2020     Progress Report: []  Yes  [x]  No     Date Range for reporting period:  Beginnin/15/2020  Ending:      Progress report due (10 Rx/or 30 days whichever is less):      Recertification due (POC duration/ or 90 days whichever is less): 2020     Visit # Insurance Allowable Auth required?  Date Range   1 60 []  Yes  [x]  No n/a     Latex Allergy:  [x]NO      []YES  Preferred Language for Healthcare:   [x]English       []other:    Functional Scale:        Date assessed:  LEFS: raw score = 61; dysfunction = 24%   7/15/2020    Pain level:  5/10     SUBJECTIVE:  See eval    OBJECTIVE: See eval      RESTRICTIONS/PRECAUTIONS: none    Exercises/Interventions:     Therapeutic Exercise (29345)  Resistance / level Sets/sec Reps Notes / Cues   bike npv      Incline board calf stretch  30\" 3    Seated HS stretch  30\" 3    Heel slides npv             SLR - flexion  1 10 Instructed to work up to 3 sets   SLR - abduction  1 10 Instructed to work up to 3 sets                        Therapeutic Activities (13076)                                   Neuromuscular Re-ed (94770)       Quad sets  5\" 10 Towel roll and tactile cueing   TKE blue 1 15 Verbal cueing throughout   SAQ npv                                  Manual proper function of core, proximal hip and LE with self care and ADLs  [] (35244) Gait Re-education- Provided training and instruction to the patient for proper LE, core and proximal hip recruitment and positioning and eccentric body weight control with ambulation re-education including up and down stairs     Home Exercise Program:    [x] (49628) Reviewed/Progressed HEP activities related to strengthening, flexibility, endurance, ROM of core, proximal hip and LE for functional self-care, mobility, lifting and ambulation/stair navigation   [] (23773)Reviewed/Progressed HEP activities related to improving balance, coordination, kinesthetic sense, posture, motor skill, proprioception of core, proximal hip and LE for self care, mobility, lifting, and ambulation/stair navigation      Manual Treatments:  PROM / STM / Oscillations-Mobs:  G-I, II, III, IV (PA's, Inf., Post.)  [] (55045) Provided manual therapy to mobilize LE, proximal hip and/or LS spine soft tissue/joints for the purpose of modulating pain, promoting relaxation,  increasing ROM, reducing/eliminating soft tissue swelling/inflammation/restriction, improving soft tissue extensibility and allowing for proper ROM for normal function with self care, mobility, lifting and ambulation. Modalities:  [] (62114) Vasopneumatic compression: Utilized vasopneumatic compression to decrease edema / swelling for the purpose of improving mobility and quad tone / recruitment which will allow for increased overall function including but not limited to self-care, transfers, ambulation, and ascending / descending stairs.        Modalities:      Charges:  Timed Code Treatment Minutes: 25   Total Treatment Minutes: 45     [x] EVAL - LOW (51814)   [] EVAL - MOD (19052)  [] EVAL - HIGH (16738)  [] RE-EVAL (85533)  [x] SY(38188) x  2     [] Ionto  [] NMR (27350) x       [] Vaso  [] Manual (98165) x       [] Ultrasound  [] TA x        [] Mech Traction (40454)  [] Aquatic Therapy x [] ES (un) (06027):   [] Home Management Training x  [] ES(attended) (88279)   [] Dry Needling 1-2 muscles (18336):  [] Dry Needling 3+ muscles (262997  [] Group:      [] Other:     GOALS:  Patient stated goal: return to body weight exercising without pain  [] Progressing: [] Met: [] Not Met: [] Adjusted    Therapist goals for Patient:   Short Term Goals: To be achieved in: 2 weeks  1. Independent in HEP and progression per patient tolerance, in order to prevent re-injury. [] Progressing: [] Met: [] Not Met: [] Adjusted  2. Patient will have a decrease in pain to facilitate improvement in movement, function, and ADLs as indicated by Functional Deficits. [] Progressing: [] Met: [] Not Met: [] Adjusted    Long Term Goals: To be achieved in: 4-6 weeks  1. Disability index score of 15% or less for the LEFS to assist with reaching prior level of function. [] Progressing: [] Met: [] Not Met: [] Adjusted  2. Patient will demonstrate increased AROM to 0-120 to allow for proper joint functioning as indicated by patients Functional Deficits. [] Progressing: [] Met: [] Not Met: [] Adjusted  3. Patient will demonstrate an increase in Strength to at least 4+/5 throughout as well as good proximal hip strength and control to allow for proper functional mobility as indicated by patients Functional Deficits. [] Progressing: [] Met: [] Not Met: [] Adjusted  4. Patient will return to functional activities including walking and standing for work without increased symptoms or restriction. [] Progressing: [] Met: [] Not Met: [] Adjusted  5. Patient will return to body weight workouts without increased pain. [] Progressing: [] Met: [] Not Met: [] Adjusted    Overall Progression Towards Functional goals/ Treatment Progress Update:  [] Patient is progressing as expected towards functional goals listed. [] Progression is slowed due to complexities/Impairments listed.   [] Progression has been slowed due to co-morbidities. [x] Plan just implemented, too soon to assess goals progression <30days   [] Goals require adjustment due to lack of progress  [] Patient is not progressing as expected and requires additional follow up with physician  [] Other    Persisting Functional Limitations/Impairments:  []Sitting [x]Standing   [x]Walking [x]Stairs   []Transfers [x]ADLs   [x]Squatting/bending []Kneeling  []Housework [x]Job related tasks  []Driving [x]Sports/Recreation   []Sleeping []Other:    ASSESSMENT:  See eval  Treatment/Activity Tolerance:  [x] Pt able to complete treatment [] Patient limited by fatique  [] Patient limited by pain  [] Patient limited by other medical complications  [] Other:     Prognosis:  [x] Good [] Fair  [] Poor    Patient Requires Follow-up: [x] Yes  [] No    Return to Play:    [x]  N/A   []  Stage 1: Intro to Strength   []  Stage 2: Return to Run and Strength   []  Stage 3: Return to Jump and Strength   []  Stage 4: Dynamic Strength and Agility   []  Stage 5: Sport Specific Training     []  Ready to Return to Play, Meets All Above Stages   []  Not Ready for Return to Sports   Comments:            PLAN: See eval. PT 1-2x / week for 4-6 weeks. [] Continue per plan of care [] Alter current plan (see comments)  [x] Plan of care initiated [] Hold pending MD visit [] Discharge    Electronically signed by: Candy Ceballos PT, DPT      Note: If patient does not return for scheduled/ recommended follow up visits, this note will serve as a discharge from care along with most recent update on progress.

## 2020-07-22 ENCOUNTER — HOSPITAL ENCOUNTER (OUTPATIENT)
Dept: PHYSICAL THERAPY | Age: 30
Setting detail: THERAPIES SERIES
Discharge: HOME | End: 2020-07-22
Payer: COMMERCIAL

## 2020-07-22 PROCEDURE — 97161 PT EVAL LOW COMPLEX 20 MIN: CPT | Performed by: PHYSICAL THERAPIST

## 2020-07-22 PROCEDURE — 97110 THERAPEUTIC EXERCISES: CPT | Performed by: PHYSICAL THERAPIST

## 2020-07-22 PROCEDURE — 97112 NEUROMUSCULAR REEDUCATION: CPT | Performed by: PHYSICAL THERAPIST

## 2020-07-22 NOTE — FLOWSHEET NOTE
Lian Sandi  Phone: [phone]   Fax: [phone]    Physical Therapy Treatment Note/ Progress Report:     Date:  2020    Patient Name:  Higinio Sandifer    :  1990  MRN: [id number]  Restrictions/Precautions:    Medical/Treatment Diagnosis Information:  Diagnosis: M23.91 (ICD-10-CM) - Patellar malalignment syndrome of right knee  Treatment Diagnosis: R knee pain, quad atrophy, hamstring tightness  Insurance/Certification information:  PT Insurance Information: [place]  Physician Information:  Referring Practitioner: Dr. Rodas Purchase of care signed (Y/N): []  Yes [x]  No     Date of Patient follow up with Physician: 2020     Progress Report: []  Yes  [x]  No     Date Range for reporting period:  Beginnin/15/2020  Ending:      Progress report due (10 Rx/or 30 days whichever is less): 2051     Recertification due (POC duration/ or 90 days whichever is less): 2020     Visit # Insurance Allowable Auth required? Date Range   2 60 []  Yes  [x]  No n/a     Latex Allergy:  [x]NO      []YES  Preferred Language for Healthcare:   [x]English       []other:    Functional Scale:        Date assessed:  LEFS: raw score = 61; dysfunction = 24%   7/15/2020    Pain level:  0/10 currently     SUBJECTIVE:  Pt. Reports that her knee feels okay at rest but she continues to have medial knee pain especially with stairs. Pt. Reports compliance with HEP.      OBJECTIVE: See eval      RESTRICTIONS/PRECAUTIONS: none    Exercises/Interventions:     Therapeutic Exercise (05095)  Resistance / level Sets/sec Reps Notes / Cues   bike L2 5'     Incline board calf stretch  30\" 3    Seated HS stretch  30\" 3    Supine ITB stretch  30\" 3 Start    Heel slides  10\" 5 Start           SLR - flexion  1 15    SLR - abduction  1 15    Leg press - w/ blue hip abd band DL60# 1 10  increased pain   Wall sit - w/ blue hip abd band  20\" 1  increased pain Therapeutic Activities (44174)                                   Neuromuscular Re-ed (72468)       Quad sets  5\" 10 Towel roll and tactile cueing   TKE blue 1 15 Verbal cueing throughout   SAQ 2# 1 15 Start 7/22 tactile faciliation   Pt. Education on knee positioning in standing - decreases hyperextension  4'  7/22                        Manual Intervention ([phone])       Knee mobs/PROM       Tib/Fem Mobs       Patella Mobs       Ankle mobs                         Pt. Education:  -all pt questions were answered    Home Exercise Program:  Access Code: [email or web address]   URL: [email or web address]   Date: 07/15/2020   Prepared by: Bailey Law     Exercises   Seated Table Hamstring Stretch - 3 reps - 30 hold - 1x daily - 7x weekly   Standing Gastroc Stretch on Step - 3 reps - 30 hold - 1x daily - 7x weekly   Long Sitting Quad Set - 10 reps - 5 hold - 1x daily - 7x weekly   Supine Straight Leg Raises - 10 reps - 3 sets - 1x daily - 7x weekly   Sidelying Hip Abduction - 10 reps - 3 sets - 1x daily - 7x weekly   Standing Terminal Knee Extension with Resistance - 10 reps - 3 sets - 1x daily - 7x weekly       Therapeutic Exercise and NMR EXR  [x] (40613) Provided verbal/tactile cueing for activities related to strengthening, flexibility, endurance, ROM for improvements in LE, proximal hip, and core control with self care, mobility, lifting, ambulation.  [] (65849) Provided verbal/tactile cueing for activities related to improving balance, coordination, kinesthetic sense, posture, motor skill, proprioception  to assist with LE, proximal hip, and core control in self care, mobility, lifting, ambulation and eccentric single leg control.   [] (27845) Therapist is in constant attendance of 2 or more patients providing skilled therapy interventions, but not providing any significant amount of measurable one-on-one time to either patient, for improvements in LE, proximal hip, and core control in self care, mobility, Total Treatment Minutes: 42     [] EVAL - LOW (84429)   [] EVAL - MOD (09761)  [] EVAL - HIGH (41588)  [] RE-EVAL (88977)  [x] IU(22187) x  2     [] Ionto  [x] NMR (18191) x  1     [] Vaso  [] Manual (66427) x       [] Ultrasound  [] TA x        [] Mech Traction (17479)  [] Aquatic Therapy x     [] ES (un) (31237):   [] Home Management Training x  [] ES(attended) (96695)   [] Dry Needling 1-2 muscles (27702):  [] Dry Needling 3+ muscles (444245  [] Group:      [] Other:     GOALS:  Patient stated goal: return to body weight exercising without pain  [] Progressing: [] Met: [] Not Met: [] Adjusted    Therapist goals for Patient:   Short Term Goals: To be achieved in: 2 weeks  1. Independent in HEP and progression per patient tolerance, in order to prevent re-injury. [] Progressing: [] Met: [] Not Met: [] Adjusted  2. Patient will have a decrease in pain to facilitate improvement in movement, function, and ADLs as indicated by Functional Deficits. [] Progressing: [] Met: [] Not Met: [] Adjusted    Long Term Goals: To be achieved in: 4-6 weeks  1. Disability index score of 15% or less for the LEFS to assist with reaching prior level of function. [] Progressing: [] Met: [] Not Met: [] Adjusted  2. Patient will demonstrate increased AROM to 0-120 to allow for proper joint functioning as indicated by patients Functional Deficits. [] Progressing: [] Met: [] Not Met: [] Adjusted  3. Patient will demonstrate an increase in Strength to at least 4+/5 throughout as well as good proximal hip strength and control to allow for proper functional mobility as indicated by patients Functional Deficits. [] Progressing: [] Met: [] Not Met: [] Adjusted  4. Patient will return to functional activities including walking and standing for work without increased symptoms or restriction. [] Progressing: [] Met: [] Not Met: [] Adjusted  5. Patient will return to body weight workouts without increased pain.    [] Progressing: [] Met: [] Not Met: [] Adjusted    Overall Progression Towards Functional goals/ Treatment Progress Update:  [] Patient is progressing as expected towards functional goals listed. [] Progression is slowed due to complexities/Impairments listed. [] Progression has been slowed due to co-morbidities. [x] Plan just implemented, too soon to assess goals progression <30days   [] Goals require adjustment due to lack of progress  [] Patient is not progressing as expected and requires additional follow up with physician  [] Other    Persisting Functional Limitations/Impairments:  []Sitting [x]Standing   [x]Walking [x]Stairs   []Transfers [x]ADLs   [x]Squatting/bending []Kneeling  []Housework [x]Job related tasks  []Driving [x]Sports/Recreation   []Sleeping []Other:    ASSESSMENT:    Treatment/Activity Tolerance:  [x] Pt able to complete treatment [] Patient limited by fatique  [] Patient limited by pain  [] Patient limited by other medical complications  [x] Other: pt. Tolerated therapy today with minimal complaints. Pt. Continues to have medial knee pain with knee extension activities. Pain subsides with manual patellar positioning during movement. Pt. Continues to have significant deficits in quad strength. Attempted closed chain strengthening activities but increased knee pain so plan to continue to strengthen in non-painful ranges then resume additional closed chain exercises. Reviewed with pt. Knee positioning in standing to decrease pressure on knee. Pt. Requires continued progression of skilled therapy in order to restore quad strength and decrease pain.      Prognosis:  [x] Good [] Fair  [] Poor    Patient Requires Follow-up: [x] Yes  [] No    Return to Play:    [x]  N/A   []  Stage 1: Intro to Strength   []  Stage 2: Return to Run and Strength   []  Stage 3: Return to Jump and Strength   []  Stage 4: Dynamic Strength and Agility   []  Stage 5: Sport Specific Training     []  Ready to Return to Play, Meets All Above Stages   []  Not Ready for Return to Sports   Comments:            PLAN: See eval. PT 1-2x / week for 4-6 weeks. [x] Continue per plan of care [] Alter current plan (see comments)  [] Plan of care initiated [] Hold pending MD visit [] Discharge    Electronically signed by: Michaela Bain PT, DPT      Note: If patient does not return for scheduled/ recommended follow up visits, this note will serve as a discharge from care along with most recent update on progress.

## 2020-07-24 ENCOUNTER — HOSPITAL ENCOUNTER (OUTPATIENT)
Dept: PHYSICAL THERAPY | Age: 30
Setting detail: THERAPIES SERIES
Discharge: HOME | End: 2020-07-24
Payer: COMMERCIAL

## 2020-07-24 PROCEDURE — 97112 NEUROMUSCULAR REEDUCATION: CPT | Performed by: PHYSICAL THERAPIST

## 2020-07-24 PROCEDURE — 97110 THERAPEUTIC EXERCISES: CPT | Performed by: PHYSICAL THERAPIST

## 2020-07-24 NOTE — FLOWSHEET NOTE
[facility]  Phone: [phone]   Fax: [phone]    Physical Therapy Treatment Note/ Progress Report:     Date:  2020    Patient Name:  Oksana Orona    :  1990  MRN: [id number]  Restrictions/Precautions:    Medical/Treatment Diagnosis Information:  Diagnosis: M23.91 (ICD-10-CM) - Patellar malalignment syndrome of right knee  Treatment Diagnosis: R knee pain, quad atrophy, hamstring tightness  Insurance/Certification information:  PT Insurance Information: [place]  Physician Information:  Referring Practitioner: Dr. Stephan Ross of care signed (Y/N): []  Yes [x]  No     Date of Patient follow up with Physician: 2020     Progress Report: []  Yes  [x]  No     Date Range for reporting period:  Beginnin/15/2020  Ending:      Progress report due (10 Rx/or 30 days whichever is less):      Recertification due (POC duration/ or 90 days whichever is less): 2020     Visit # Insurance Allowable Auth required? Date Range   3 60 []  Yes  [x]  No n/a     Latex Allergy:  [x]NO      []YES  Preferred Language for Healthcare:   [x]English       []other:    Functional Scale:        Date assessed:  LEFS: raw score = 61; dysfunction = 24%   7/15/2020    Pain level:  0/10 currently     SUBJECTIVE:  Pt. Reports no pain with resting. Pt. States that her quad has been a little more sore since previous therapy session. She continues to have medial and inferior knee pain especially with going down the stairs.      OBJECTIVE: See eval      RESTRICTIONS/PRECAUTIONS: none    Exercises/Interventions:     Therapeutic Exercise (09674)  Resistance / level Sets/sec Reps Notes / Cues   Bike (seat position 4) L2 5'     Incline board calf stretch  30\" 3    Seated HS stretch  30\" 3    Supine ITB stretch  30\" 3 Start    Prone quad stretch  30\" 3 Start    Heel slides  10\" 5 Start           Bridge W/ BS 5\" 10 Start    SLR - flexion 2# 2 10 ^7/24   SLR - abduction 2# 2 10 ^7/24 7/22 increased pain   7/22 increased pain          Therapeutic Activities (90735)                                   Neuromuscular Re-ed (89505)       Quad sets  5\" 10 Towel roll and tactile cueing   TKE blue 1 15 Verbal cueing throughout   SAQ 2# 1 15 Start 7/22 tactile faciliation   Reviewed knee positioning in standing - decreases hyperextension  4'  7/22                        Manual Intervention (66110)       Knee mobs/PROM       Tib/Fem Mobs       Patella Mobs       Ankle mobs                         Pt. Education:  -all pt questions were answered    Home Exercise Program:  Access Code: [email or web address]   URL: [email or web address]   Date: 07/15/2020   Prepared by: Delmer Stephens     Exercises   Seated Table Hamstring Stretch - 3 reps - 30 hold - 1x daily - 7x weekly   Standing Gastroc Stretch on Step - 3 reps - 30 hold - 1x daily - 7x weekly   Long Sitting Quad Set - 10 reps - 5 hold - 1x daily - 7x weekly   Supine Straight Leg Raises - 10 reps - 3 sets - 1x daily - 7x weekly   Sidelying Hip Abduction - 10 reps - 3 sets - 1x daily - 7x weekly   Standing Terminal Knee Extension with Resistance - 10 reps - 3 sets - 1x daily - 7x weekly       Therapeutic Exercise and NMR EXR  [x] (04953) Provided verbal/tactile cueing for activities related to strengthening, flexibility, endurance, ROM for improvements in LE, proximal hip, and core control with self care, mobility, lifting, ambulation.  [] (37473) Provided verbal/tactile cueing for activities related to improving balance, coordination, kinesthetic sense, posture, motor skill, proprioception  to assist with LE, proximal hip, and core control in self care, mobility, lifting, ambulation and eccentric single leg control.   [] (98373) Therapist is in constant attendance of 2 or more patients providing skilled therapy interventions, but not providing any significant amount of measurable one-on-one time to either patient, for improvements in LE, proximal hip, and core control in self care, mobility, lifting, ambulation and eccentric single leg control. NMR and Therapeutic Activities:    [x] (71164 or 02350) Provided verbal/tactile cueing for activities related to improving balance, coordination, kinesthetic sense, posture, motor skill, proprioception and motor activation to allow for proper function of core, proximal hip and LE with self care and ADLs  [] (77270) Gait Re-education- Provided training and instruction to the patient for proper LE, core and proximal hip recruitment and positioning and eccentric body weight control with ambulation re-education including up and down stairs     Home Exercise Program:    [x] (22976) Reviewed/Progressed HEP activities related to strengthening, flexibility, endurance, ROM of core, proximal hip and LE for functional self-care, mobility, lifting and ambulation/stair navigation   [] (73212)Reviewed/Progressed HEP activities related to improving balance, coordination, kinesthetic sense, posture, motor skill, proprioception of core, proximal hip and LE for self care, mobility, lifting, and ambulation/stair navigation      Manual Treatments:  PROM / STM / Oscillations-Mobs:  G-I, II, III, IV (PA's, Inf., Post.)  [] (76721) Provided manual therapy to mobilize LE, proximal hip and/or LS spine soft tissue/joints for the purpose of modulating pain, promoting relaxation,  increasing ROM, reducing/eliminating soft tissue swelling/inflammation/restriction, improving soft tissue extensibility and allowing for proper ROM for normal function with self care, mobility, lifting and ambulation.      Modalities:  [] (83643) Vasopneumatic compression: Utilized vasopneumatic compression to decrease edema / swelling for the purpose of improving mobility and quad tone / recruitment which will allow for increased overall function including but not limited to self-care, transfers, ambulation, and ascending / descending stairs. Modalities:      Charges:  Timed Code Treatment Minutes: 45   Total Treatment Minutes: 45     [] EVAL - LOW (94339)   [] EVAL - MOD (76528)  [] EVAL - HIGH (78667)  [] RE-EVAL (62851)  [x] VQ(99439) x  2     [] Ionto  [x] NMR (16045) x  1     [] Vaso  [] Manual (88653) x       [] Ultrasound  [] TA x        [] Mech Traction (14267)  [] Aquatic Therapy x     [] ES (un) (39488):   [] Home Management Training x  [] ES(attended) (61979)   [] Dry Needling 1-2 muscles (42086):  [] Dry Needling 3+ muscles (229085  [] Group:      [] Other:     GOALS:  Patient stated goal: return to body weight exercising without pain  [] Progressing: [] Met: [] Not Met: [] Adjusted    Therapist goals for Patient:   Short Term Goals: To be achieved in: 2 weeks  1. Independent in HEP and progression per patient tolerance, in order to prevent re-injury. [] Progressing: [] Met: [] Not Met: [] Adjusted  2. Patient will have a decrease in pain to facilitate improvement in movement, function, and ADLs as indicated by Functional Deficits. [] Progressing: [] Met: [] Not Met: [] Adjusted    Long Term Goals: To be achieved in: 4-6 weeks  1. Disability index score of 15% or less for the LEFS to assist with reaching prior level of function. [] Progressing: [] Met: [] Not Met: [] Adjusted  2. Patient will demonstrate increased AROM to 0-120 to allow for proper joint functioning as indicated by patients Functional Deficits. [] Progressing: [] Met: [] Not Met: [] Adjusted  3. Patient will demonstrate an increase in Strength to at least 4+/5 throughout as well as good proximal hip strength and control to allow for proper functional mobility as indicated by patients Functional Deficits. [] Progressing: [] Met: [] Not Met: [] Adjusted  4. Patient will return to functional activities including walking and standing for work without increased symptoms or restriction. [] Progressing: [] Met: [] Not Met: [] Adjusted  5.  Patient will return to body weight workouts without increased pain. [] Progressing: [] Met: [] Not Met: [] Adjusted    Overall Progression Towards Functional goals/ Treatment Progress Update:  [] Patient is progressing as expected towards functional goals listed. [] Progression is slowed due to complexities/Impairments listed. [] Progression has been slowed due to co-morbidities. [x] Plan just implemented, too soon to assess goals progression <30days   [] Goals require adjustment due to lack of progress  [] Patient is not progressing as expected and requires additional follow up with physician  [] Other    Persisting Functional Limitations/Impairments:  []Sitting [x]Standing   [x]Walking [x]Stairs   []Transfers [x]ADLs   [x]Squatting/bending []Kneeling  []Housework [x]Job related tasks  []Driving [x]Sports/Recreation   []Sleeping []Other:    ASSESSMENT:    Treatment/Activity Tolerance:  [x] Pt able to complete treatment [] Patient limited by fatique  [] Patient limited by pain  [] Patient limited by other medical complications  [x] Other: Pt. Fatigued by therapy session this date. Held on most closed chain activities this date due to pain last session. Pt. Demonstrates slight improvements in knee flexion ROM following stretching. Pt. Continues to fatigue quickly and have deficits in quad strength. Decreased pain with bike this date. Able to add light resistance with SLR but requires increased time and rest breaks to complete. Pt. Improving in quad activation and standing position awareness. Added bridges without issue. Pt. Will continue to benefit from skilled therapy in order to restore functional strength for decreased pain with daily activities such as stairs.      Prognosis:  [x] Good [] Fair  [] Poor    Patient Requires Follow-up: [x] Yes  [] No    Return to Play:    [x]  N/A   []  Stage 1: Intro to Strength   []  Stage 2: Return to Run and Strength   []  Stage 3: Return to Jump and Strength   []  Stage 4: Dynamic Strength and Agility   []  Stage 5: Sport Specific Training     []  Ready to Return to Play, Meets All Above Stages   []  Not Ready for Return to Sports   Comments:            PLAN: See eval. PT 1-2x / week for 4-6 weeks. [x] Continue per plan of care [] Alter current plan (see comments)  [] Plan of care initiated [] Hold pending MD visit [] Discharge    Electronically signed by: Xu Gallegos PT, DPT      Note: If patient does not return for scheduled/ recommended follow up visits, this note will serve as a discharge from care along with most recent update on progress.

## 2020-07-27 ENCOUNTER — HOSPITAL ENCOUNTER (OUTPATIENT)
Dept: PHYSICAL THERAPY | Age: 30
Setting detail: THERAPIES SERIES
Discharge: HOME | End: 2020-07-27
Payer: COMMERCIAL

## 2020-07-27 PROCEDURE — 97112 NEUROMUSCULAR REEDUCATION: CPT

## 2020-07-27 PROCEDURE — 97110 THERAPEUTIC EXERCISES: CPT

## 2020-07-27 NOTE — FLOWSHEET NOTE
Sandi Beal  Phone: [phone]   Fax: [phone]    Physical Therapy Treatment Note/ Progress Report:     Date:  2020    Patient Name:  Enzo Zoroastrianism    :  1990  MRN: [id number]  Restrictions/Precautions:    Medical/Treatment Diagnosis Information:  Diagnosis: M23.91 (ICD-10-CM) - Patellar malalignment syndrome of right knee  Treatment Diagnosis: R knee pain, quad atrophy, hamstring tightness  Insurance/Certification information:  PT Insurance Information: [place]  Physician Information:  Referring Practitioner: Dr. Star Best of care signed (Y/N): []  Yes [x]  No     Date of Patient follow up with Physician: 2020     Progress Report: []  Yes  [x]  No     Date Range for reporting period:  Beginnin/15/2020  Ending:      Progress report due (10 Rx/or 30 days whichever is less):      Recertification due (POC duration/ or 90 days whichever is less): 2020     Visit # Insurance Allowable Auth required? Date Range   4 60 []  Yes  [x]  No n/a     Latex Allergy:  [x]NO      []YES  Preferred Language for Healthcare:   [x]English       []other:    Functional Scale:        Date assessed:  LEFS: raw score = 61; dysfunction = 24%   7/15/2020    Pain level:  0/10 currently     SUBJECTIVE:  Pt reports feeling \"day to day\" knee is getting better but still has soreness/pain with stairs and standing after prolonged sitting. Reports having more pain lately with quad sets exercise. Has had issues this week with going down stairs and usually has more issues with going up stairs. Denies pain at present time.       OBJECTIVE: See eval      RESTRICTIONS/PRECAUTIONS: none    Exercises/Interventions:     Therapeutic Exercise (74537)  Resistance / level Sets/sec Reps Notes / Cues   Bike (seat position 4) L2 5'     Incline board calf stretch  30\" 3    Seated HS stretch  30\" 3    Supine ITB stretch  30\" 3 Start    Prone quad stretch 30\" 3 Start 7/24   Heel slides  10\" 5 Start 7/22          Bridge W/ BS 5\" 10 Start 7/24   SLR - flexion 2# 2 10 ^7/24   SLR - abduction 2# 2 10 ^7/24 7/22 increased pain   7/22 increased pain          Therapeutic Activities (74133)                                   Neuromuscular Re-ed (36490)       Quad sets  5\" 10 Towel roll and tactile cueing   TKE blue 1 15 Verbal cueing throughout   SAQ 2# 1 15 Start 7/22 tactile faciliation    7/22                        Manual Intervention (72032)       Knee mobs/PROM       Tib/Fem Mobs       Patella Mobs       Ankle mobs                         Pt. Education:  -all pt questions were answered    Home Exercise Program:  Access Code: [email or web address]   URL: [email or web address]   Date: 07/15/2020   Prepared by: Genevieve East     Exercises   Seated Table Hamstring Stretch - 3 reps - 30 hold - 1x daily - 7x weekly   Standing Gastroc Stretch on Step - 3 reps - 30 hold - 1x daily - 7x weekly   Long Sitting Quad Set - 10 reps - 5 hold - 1x daily - 7x weekly   Supine Straight Leg Raises - 10 reps - 3 sets - 1x daily - 7x weekly   Sidelying Hip Abduction - 10 reps - 3 sets - 1x daily - 7x weekly   Standing Terminal Knee Extension with Resistance - 10 reps - 3 sets - 1x daily - 7x weekly       Therapeutic Exercise and NMR EXR  [x] (71676) Provided verbal/tactile cueing for activities related to strengthening, flexibility, endurance, ROM for improvements in LE, proximal hip, and core control with self care, mobility, lifting, ambulation.  [] (90905) Provided verbal/tactile cueing for activities related to improving balance, coordination, kinesthetic sense, posture, motor skill, proprioception  to assist with LE, proximal hip, and core control in self care, mobility, lifting, ambulation and eccentric single leg control.   [] (01316) Therapist is in constant attendance of 2 or more patients providing skilled therapy interventions, but not providing any significant amount of measurable one-on-one time to either patient, for improvements in LE, proximal hip, and core control in self care, mobility, lifting, ambulation and eccentric single leg control. NMR and Therapeutic Activities:    [x] (24325 or 74021) Provided verbal/tactile cueing for activities related to improving balance, coordination, kinesthetic sense, posture, motor skill, proprioception and motor activation to allow for proper function of core, proximal hip and LE with self care and ADLs  [] (30910) Gait Re-education- Provided training and instruction to the patient for proper LE, core and proximal hip recruitment and positioning and eccentric body weight control with ambulation re-education including up and down stairs     Home Exercise Program:    [x] (76734) Reviewed/Progressed HEP activities related to strengthening, flexibility, endurance, ROM of core, proximal hip and LE for functional self-care, mobility, lifting and ambulation/stair navigation   [] (37317)Reviewed/Progressed HEP activities related to improving balance, coordination, kinesthetic sense, posture, motor skill, proprioception of core, proximal hip and LE for self care, mobility, lifting, and ambulation/stair navigation      Manual Treatments:  PROM / STM / Oscillations-Mobs:  G-I, II, III, IV (PA's, Inf., Post.)  [] (80953) Provided manual therapy to mobilize LE, proximal hip and/or LS spine soft tissue/joints for the purpose of modulating pain, promoting relaxation,  increasing ROM, reducing/eliminating soft tissue swelling/inflammation/restriction, improving soft tissue extensibility and allowing for proper ROM for normal function with self care, mobility, lifting and ambulation.      Modalities:  [] (57969) Vasopneumatic compression: Utilized vasopneumatic compression to decrease edema / swelling for the purpose of improving mobility and quad tone / recruitment which will allow for increased overall function including but not limited to self-care, transfers, ambulation, and ascending / descending stairs. Modalities:      Charges:  Timed Code Treatment Minutes: 45   Total Treatment Minutes: 45     [] EVAL - LOW (03239)   [] EVAL - MOD (47171)  [] EVAL - HIGH (12086)  [] RE-EVAL (81917)  [x] GI(20628) x  2     [] Ionto  [x] NMR (55433) x  1     [] Vaso  [] Manual (52194) x       [] Ultrasound  [] TA x        [] Mech Traction (20303)  [] Aquatic Therapy x      [] ES (un) (86309):   [] Home Management Training x  [] ES(attended) (48974)   [] Dry Needling 1-2 muscles (89133):  [] Dry Needling 3+ muscles (879987)  [] Group:      [] Other:     GOALS:  Patient stated goal: return to body weight exercising without pain  [] Progressing: [] Met: [] Not Met: [] Adjusted    Therapist goals for Patient:   Short Term Goals: To be achieved in: 2 weeks  1. Independent in HEP and progression per patient tolerance, in order to prevent re-injury. [] Progressing: [] Met: [] Not Met: [] Adjusted  2. Patient will have a decrease in pain to facilitate improvement in movement, function, and ADLs as indicated by Functional Deficits. [] Progressing: [] Met: [] Not Met: [] Adjusted    Long Term Goals: To be achieved in: 4-6 weeks  1. Disability index score of 15% or less for the LEFS to assist with reaching prior level of function. [] Progressing: [] Met: [] Not Met: [] Adjusted  2. Patient will demonstrate increased AROM to 0-120 to allow for proper joint functioning as indicated by patients Functional Deficits. [] Progressing: [] Met: [] Not Met: [] Adjusted  3. Patient will demonstrate an increase in Strength to at least 4+/5 throughout as well as good proximal hip strength and control to allow for proper functional mobility as indicated by patients Functional Deficits. [] Progressing: [] Met: [] Not Met: [] Adjusted  4. Patient will return to functional activities including walking and standing for work without increased symptoms or restriction.    [] Progressing: [] Met: [] Not Met: [] Adjusted  5. Patient will return to body weight workouts without increased pain. [] Progressing: [] Met: [] Not Met: [] Adjusted    Overall Progression Towards Functional goals/ Treatment Progress Update:  [] Patient is progressing as expected towards functional goals listed. [] Progression is slowed due to complexities/Impairments listed. [] Progression has been slowed due to co-morbidities. [x] Plan just implemented, too soon to assess goals progression <30days   [] Goals require adjustment due to lack of progress  [] Patient is not progressing as expected and requires additional follow up with physician  [] Other    Persisting Functional Limitations/Impairments:  []Sitting [x]Standing   [x]Walking [x]Stairs   []Transfers [x]ADLs   [x]Squatting/bending []Kneeling  []Housework [x]Job related tasks  []Driving [x]Sports/Recreation   []Sleeping []Other:    ASSESSMENT:  Pt is slow to perform exercises due to weakness in hips and LE with tenderness noted around medial infrapatellar region during SAQ. Tenderness was minimized with slight ER of LE with eccentric lowering during SAQ exercise. Pt struggled with workout this date due to fatigue and muscle soreness but completed routine with increased time. Pt will continue to benefit from skilled therapy to improve hip and LE strength to provide increased joint support and decrease pain in order to ipmrove functional mobility and tolerance to perform weight bearing activity for daily functional and work tasks.      Treatment/Activity Tolerance:  [x] Pt able to complete treatment [] Patient limited by fatique  [] Patient limited by pain  [] Patient limited by other medical complications  [] Other:     Prognosis:  [x] Good [] Fair  [] Poor    Patient Requires Follow-up: [x] Yes  [] No    Return to Play:    [x]  N/A   []  Stage 1: Intro to Strength   []  Stage 2: Return to Run and Strength   []  Stage 3: Return to Jump and Strength   []  Stage 4: Dynamic Strength and Agility   []  Stage 5: Sport Specific Training   []  Ready to Return to Play, Meets All Above Stages   []  Not Ready for Return to Sports   Comments:            PLAN: See eval. PT 1-2x / week for 4-6 weeks. [x] Continue per plan of care [] Alter current plan (see comments)  [] Plan of care initiated [] Hold pending MD visit [] Discharge    Electronically signed by: Suad Delgado      Note: If patient does not return for scheduled/ recommended follow up visits, this note will serve as a discharge from care along with most recent update on progress.

## 2020-07-29 ENCOUNTER — HOSPITAL ENCOUNTER (OUTPATIENT)
Dept: PHYSICAL THERAPY | Age: 30
Setting detail: THERAPIES SERIES
Discharge: HOME | End: 2020-07-29
Payer: COMMERCIAL

## 2020-07-29 PROCEDURE — 97110 THERAPEUTIC EXERCISES: CPT | Performed by: PHYSICAL THERAPIST

## 2020-07-29 PROCEDURE — 97112 NEUROMUSCULAR REEDUCATION: CPT | Performed by: PHYSICAL THERAPIST

## 2020-07-29 NOTE — FLOWSHEET NOTE
is in constant attendance of 2 or more patients providing skilled therapy interventions, but not providing any significant amount of measurable one-on-one time to either patient, for improvements in LE, proximal hip, and core control in self care, mobility, lifting, ambulation and eccentric single leg control. NMR and Therapeutic Activities:    [x] (94755 or 16618) Provided verbal/tactile cueing for activities related to improving balance, coordination, kinesthetic sense, posture, motor skill, proprioception and motor activation to allow for proper function of core, proximal hip and LE with self care and ADLs  [] (35940) Gait Re-education- Provided training and instruction to the patient for proper LE, core and proximal hip recruitment and positioning and eccentric body weight control with ambulation re-education including up and down stairs     Home Exercise Program:    [x] (48652) Reviewed/Progressed HEP activities related to strengthening, flexibility, endurance, ROM of core, proximal hip and LE for functional self-care, mobility, lifting and ambulation/stair navigation   [] (79964)Reviewed/Progressed HEP activities related to improving balance, coordination, kinesthetic sense, posture, motor skill, proprioception of core, proximal hip and LE for self care, mobility, lifting, and ambulation/stair navigation      Manual Treatments:  PROM / STM / Oscillations-Mobs:  G-I, II, III, IV (PA's, Inf., Post.)  [] (92516) Provided manual therapy to mobilize LE, proximal hip and/or LS spine soft tissue/joints for the purpose of modulating pain, promoting relaxation,  increasing ROM, reducing/eliminating soft tissue swelling/inflammation/restriction, improving soft tissue extensibility and allowing for proper ROM for normal function with self care, mobility, lifting and ambulation.      Modalities:  [] (72456) Vasopneumatic compression: Utilized vasopneumatic compression to decrease edema / swelling for the purpose of improving mobility and quad tone / recruitment which will allow for increased overall function including but not limited to self-care, transfers, ambulation, and ascending / descending stairs. Modalities:      Charges:  Timed Code Treatment Minutes: 44   Total Treatment Minutes: 44     [] EVAL - LOW (01755)   [] EVAL - MOD (18464)  [] EVAL - HIGH (02623)  [] RE-EVAL (23923)  [x] BW(62670) x  2     [] Ionto  [x] NMR (92954) x  1     [] Vaso  [] Manual (90616) x       [] Ultrasound  [] TA x        [] Mech Traction (19512)  [] Aquatic Therapy x      [] ES (un) (19803):   [] Home Management Training x  [] ES(attended) (00868)   [] Dry Needling 1-2 muscles (40068):  [] Dry Needling 3+ muscles (165034)  [] Group:      [] Other:     GOALS:  Patient stated goal: return to body weight exercising without pain  [] Progressing: [] Met: [] Not Met: [] Adjusted    Therapist goals for Patient:   Short Term Goals: To be achieved in: 2 weeks  1. Independent in HEP and progression per patient tolerance, in order to prevent re-injury. [] Progressing: [] Met: [] Not Met: [] Adjusted  2. Patient will have a decrease in pain to facilitate improvement in movement, function, and ADLs as indicated by Functional Deficits. [] Progressing: [] Met: [] Not Met: [] Adjusted    Long Term Goals: To be achieved in: 4-6 weeks  1. Disability index score of 15% or less for the LEFS to assist with reaching prior level of function. [] Progressing: [] Met: [] Not Met: [] Adjusted  2. Patient will demonstrate increased AROM to 0-120 to allow for proper joint functioning as indicated by patients Functional Deficits. [] Progressing: [] Met: [] Not Met: [] Adjusted  3. Patient will demonstrate an increase in Strength to at least 4+/5 throughout as well as good proximal hip strength and control to allow for proper functional mobility as indicated by patients Functional Deficits. [] Progressing: [] Met: [] Not Met: [] Adjusted  4.  Patient will return to functional activities including walking and standing for work without increased symptoms or restriction. [] Progressing: [] Met: [] Not Met: [] Adjusted  5. Patient will return to body weight workouts without increased pain. [] Progressing: [] Met: [] Not Met: [] Adjusted    Overall Progression Towards Functional goals/ Treatment Progress Update:  [] Patient is progressing as expected towards functional goals listed. [] Progression is slowed due to complexities/Impairments listed. [] Progression has been slowed due to co-morbidities. [x] Plan just implemented, too soon to assess goals progression <30days   [] Goals require adjustment due to lack of progress  [] Patient is not progressing as expected and requires additional follow up with physician  [] Other    Persisting Functional Limitations/Impairments:  []Sitting [x]Standing   [x]Walking [x]Stairs   []Transfers [x]ADLs   [x]Squatting/bending []Kneeling  []Housework [x]Job related tasks  []Driving [x]Sports/Recreation   []Sleeping []Other:    ASSESSMENT:      Treatment/Activity Tolerance:  [x] Pt able to complete treatment [] Patient limited by fatique  [] Patient limited by pain  [] Patient limited by other medical complications  [x] Other: pt. Tolerated therapy today with complaints of fatigue only. Pt. Continues to present with generalized LE weakness and deficits in quad control. Improved technique with HEP but pt. Continues to fatigue quickly. Pt. Challenged by addition of SLR hold. Limited range with LAQ due to pain with full extension. Pt. Demonstrates improved awareness of positioning with TKE. Pt. Will continue to benefit from skilled therapy in order to progress quad strength for decreased pain with stairs and prolonged standing.      Prognosis:  [x] Good [] Fair  [] Poor    Patient Requires Follow-up: [x] Yes  [] No    Return to Play:    [x]  N/A   []  Stage 1: Intro to Strength   []  Stage 2: Return to Run and Strength   []  Stage 3:

## 2020-08-03 ENCOUNTER — HOSPITAL ENCOUNTER (OUTPATIENT)
Dept: PHYSICAL THERAPY | Age: 30
Setting detail: THERAPIES SERIES
Discharge: HOME | End: 2020-08-03
Payer: COMMERCIAL

## 2020-08-03 PROCEDURE — 97112 NEUROMUSCULAR REEDUCATION: CPT

## 2020-08-03 PROCEDURE — 97110 THERAPEUTIC EXERCISES: CPT

## 2020-08-03 NOTE — FLOWSHEET NOTE
Lian Sandi  Phone: [phone]   Fax: [phone]    Physical Therapy Treatment Note/ Progress Report:     Date:  8/3/2020    Patient Name:  Tanya Sawant    :  1990  MRN: [id number]  Restrictions/Precautions:    Medical/Treatment Diagnosis Information:  Diagnosis: M23.91 (ICD-10-CM) - Patellar malalignment syndrome of right knee  Treatment Diagnosis: R knee pain, quad atrophy, hamstring tightness  Insurance/Certification information:  PT Insurance Information: [employer]  Physician Information:  Referring Practitioner: Dr. Morrison Records of care signed (Y/N): []  Yes [x]  No     Date of Patient follow up with Physician: 2020     Progress Report: []  Yes  [x]  No     Date Range for reporting period:  Beginnin/15/2020  Ending:      Progress report due (10 Rx/or 30 days whichever is less):      Recertification due (POC duration/ or 90 days whichever is less): 2020     Visit # Insurance Allowable Auth required? Date Range   6 60 []  Yes  [x]  No n/a     Latex Allergy:  [x]NO      []YES  Preferred Language for Healthcare:   [x]English       []other:    Functional Scale:        Date assessed:  LEFS: raw score = 61; dysfunction = 24%   7/15/2020    Pain level:  4/10 currently     SUBJECTIVE:  Reports having some instances of sharp shooting pain in knee after going down stairs yesterday and today knee is \"throbbing\" and achy but not super painful. Overall, pt feels knee is improving with less instances of pain and less severity.       OBJECTIVE: See eval      RESTRICTIONS/PRECAUTIONS: none     Exercises/Interventions:     Therapeutic Exercise (86576)  Resistance / level Sets/sec Reps Notes / Cues   Bike (seat position 4) L2 5'     Incline board calf stretch  30\" 3    Seated HS stretch  30\" 3    Supine ITB stretch  30\" 3 Start    Prone quad stretch  30\" 3 Start    Heel slides  10\" 5 Start           Bridge W/ BS 5\" 15 of 2 or more patients providing skilled therapy interventions, but not providing any significant amount of measurable one-on-one time to either patient, for improvements in LE, proximal hip, and core control in self care, mobility, lifting, ambulation and eccentric single leg control. NMR and Therapeutic Activities:    [x] (49091 or 31571) Provided verbal/tactile cueing for activities related to improving balance, coordination, kinesthetic sense, posture, motor skill, proprioception and motor activation to allow for proper function of core, proximal hip and LE with self care and ADLs  [] (37673) Gait Re-education- Provided training and instruction to the patient for proper LE, core and proximal hip recruitment and positioning and eccentric body weight control with ambulation re-education including up and down stairs     Home Exercise Program:    [x] (04515) Reviewed/Progressed HEP activities related to strengthening, flexibility, endurance, ROM of core, proximal hip and LE for functional self-care, mobility, lifting and ambulation/stair navigation   [] (54690)Reviewed/Progressed HEP activities related to improving balance, coordination, kinesthetic sense, posture, motor skill, proprioception of core, proximal hip and LE for self care, mobility, lifting, and ambulation/stair navigation      Manual Treatments:  PROM / STM / Oscillations-Mobs:  G-I, II, III, IV (PA's, Inf., Post.)  [] (04493) Provided manual therapy to mobilize LE, proximal hip and/or LS spine soft tissue/joints for the purpose of modulating pain, promoting relaxation,  increasing ROM, reducing/eliminating soft tissue swelling/inflammation/restriction, improving soft tissue extensibility and allowing for proper ROM for normal function with self care, mobility, lifting and ambulation.      Modalities:  [] (57156) Vasopneumatic compression: Utilized vasopneumatic compression to decrease edema / swelling for the purpose of improving mobility and quad tone / recruitment which will allow for increased overall function including but not limited to self-care, transfers, ambulation, and ascending / descending stairs. Modalities:      Charges:  Timed Code Treatment Minutes: 50   Total Treatment Minutes: 50     [] EVAL - LOW (97164)   [] EVAL - MOD (29797)  [] EVAL - HIGH (31252)  [] RE-EVAL (65813)  [x] UX(59364) x  2     [] Ionto  [x] NMR (65621) x  1     [] Vaso  [] Manual (82337) x       [] Ultrasound  [] TA x        [] Mech Traction (98534)  [] Aquatic Therapy x      [] ES (un) (51006):   [] Home Management Training x  [] ES(attended) (83637)   [] Dry Needling 1-2 muscles (92126):  [] Dry Needling 3+ muscles (186404)  [] Group:      [] Other:     GOALS:  Patient stated goal: return to body weight exercising without pain  [] Progressing: [] Met: [] Not Met: [] Adjusted    Therapist goals for Patient:   Short Term Goals: To be achieved in: 2 weeks  1. Independent in HEP and progression per patient tolerance, in order to prevent re-injury. [] Progressing: [] Met: [] Not Met: [] Adjusted  2. Patient will have a decrease in pain to facilitate improvement in movement, function, and ADLs as indicated by Functional Deficits. [] Progressing: [] Met: [] Not Met: [] Adjusted    Long Term Goals: To be achieved in: 4-6 weeks  1. Disability index score of 15% or less for the LEFS to assist with reaching prior level of function. [] Progressing: [] Met: [] Not Met: [] Adjusted  2. Patient will demonstrate increased AROM to 0-120 to allow for proper joint functioning as indicated by patients Functional Deficits. [] Progressing: [] Met: [] Not Met: [] Adjusted  3. Patient will demonstrate an increase in Strength to at least 4+/5 throughout as well as good proximal hip strength and control to allow for proper functional mobility as indicated by patients Functional Deficits. [] Progressing: [] Met: [] Not Met: [] Adjusted  4.  Patient will return to functional activities including walking and standing for work without increased symptoms or restriction. [] Progressing: [] Met: [] Not Met: [] Adjusted  5. Patient will return to body weight workouts without increased pain. [] Progressing: [] Met: [] Not Met: [] Adjusted    Overall Progression Towards Functional goals/ Treatment Progress Update:  [] Patient is progressing as expected towards functional goals listed. [] Progression is slowed due to complexities/Impairments listed. [] Progression has been slowed due to co-morbidities. [x] Plan just implemented, too soon to assess goals progression <30days   [] Goals require adjustment due to lack of progress  [] Patient is not progressing as expected and requires additional follow up with physician  [] Other    Persisting Functional Limitations/Impairments:  []Sitting [x]Standing   [x]Walking [x]Stairs   []Transfers [x]ADLs   [x]Squatting/bending []Kneeling  []Housework [x]Job related tasks  []Driving [x]Sports/Recreation   []Sleeping []Other:    ASSESSMENT:  Trial of K taps to inferior patella to minimize report of inferior patellar pain with LAQ. Pt reports some decreased pain and improved ability to tolerate exercise with taping. Pt able to tolerate addition of TRX squats this date with minimal depth. Pt demonstrates improvement in strength as noted by tolerance to upgraded exercises and routine but continues to have significant weakness and quad control for patellar tracking and stability. Continue with skilled therapy to promote increased strength and function to tolerate weight bearing, walking and stairs without pain and restrictions.      Treatment/Activity Tolerance:  [x] Pt able to complete treatment [] Patient limited by fatique  [] Patient limited by pain  [] Patient limited by other medical complications  [] Other:      Prognosis:  [x] Good [] Fair  [] Poor    Patient Requires Follow-up: [x] Yes  [] No    Return to Play:    [x]  N/A   []  Stage 1: Intro

## 2020-08-05 ENCOUNTER — OFFICE VISIT (OUTPATIENT)
Dept: ORTHOPEDIC SURGERY | Age: 30
End: 2020-08-05
Payer: COMMERCIAL

## 2020-08-05 ENCOUNTER — HOSPITAL ENCOUNTER (OUTPATIENT)
Dept: PHYSICAL THERAPY | Age: 30
Setting detail: THERAPIES SERIES
Discharge: HOME | End: 2020-08-05
Payer: COMMERCIAL

## 2020-08-05 VITALS — BODY MASS INDEX: 33.27 KG/M2 | TEMPERATURE: 99.6 F | WEIGHT: 212 LBS | HEIGHT: 67 IN

## 2020-08-05 PROCEDURE — 97110 THERAPEUTIC EXERCISES: CPT

## 2020-08-05 PROCEDURE — 99213 OFFICE O/P EST LOW 20 MIN: CPT | Performed by: ORTHOPAEDIC SURGERY

## 2020-08-05 PROCEDURE — 97112 NEUROMUSCULAR REEDUCATION: CPT

## 2020-08-05 NOTE — PROGRESS NOTES
Chief Complaint  Follow-up (right knee. pt states that she is doing better with the therapy )      History of Present Illness:  Sherryle Fallen is a pleasant 27 y.o. female here today for follow-up regarding her right knee. On her last visit on 7/10/2020 we reviewed her MRI which demonstrated patellar malalignment and we sent her for physical therapy. She is done 3 weeks of therapy up to this point and is reporting some improvement. Unfortunately she cannot take anti-inflammatories due to intolerance and so she is not taking any medicines when her pain does flareup. She denies any numbness or tingling down the leg. She denies any instability. She continues to report the worst pain with going up stairs. Pain Assessment  Location of Pain: Knee  Location Modifiers: Right  Severity of Pain: 3  Quality of Pain: Aching, Dull  Duration of Pain: A few minutes  Frequency of Pain: Constant  Aggravating Factors: Stairs  Limiting Behavior: Some  Relieving Factors: Rest  Result of Injury: Yes  Work-Related Injury: No  Are there other pain locations you wish to document?: No    Medical History:  Patient's medications, allergies, past medical, surgical, social and family histories were reviewed and updated as appropriate. Pertinent items are noted in HPI  Review of systems reviewed from Patient History Form dated on 8/5/20 and available in the patient's chart under the Media tab. Vital Signs:  Vitals:    08/05/20 1445   Temp: 99.6 °F (37.6 °C)         Neuro: Alert & oriented x 3,  normal,  no focal deficits noted. Normal affect. Eyes: sclera clear  Ears: Normal external ear  Mouth:  No perioral lesions  Pulm: Respirations unlabored and regular  Pulse: Regular rate and rhythm   Skin: Warm, well perfused      Constitutional: In no apparent distress. Normal affect. Alert and oriented X3 and is cooperative. R knee exam    Gait: No use of assistive devices. No antalgic gait.     Alignment: normal roles of activity medication, antiinflammatories, injections, bracing, physical therapy, and surgical interventions were all described to the patient and questions were answered. We will continue with physical therapy for another month. If the patient continues to note further improvement that she can follow-up with us as needed. However if she is having difficulties with therapy or any regression, she can see us at that time or any point prior. We instructed her to make a one-month follow-up today and if she is doing well before her next appointment she can cancel. Again no anti-inflammatories due to her intolerance. Lucrecia Mckeon is in agreement with this plan. All questions were answered to patient's satisfaction and was encouraged to call with any further questions. Kellee Marion DO  [facility] Clinical Fellow  8/5/2020      This dictation was performed with a verbal recognition program [facility] [employer]) and it was checked for errors. It is possible that there are still dictated errors within this office note. If so, please bring any errors to my attention for an addendum. All efforts were made to ensure that this office note is accurate. I personally reviewed the patient's pain scale, review of systems, family history, social history, past medical history, allergies and medications. Review of systems was collected today, reviewed and is included in the medical record. It is available under the media tab. I personally performed and or supervised the services described in this documentation and scribed by the sports medicine fellow. Guanako Frye MD  Sports Medicine, Arthroscopic Knee and Shoulder Surgery    This dictation was performed with a verbal recognition program [facility] [employer]) and it was checked for errors. It is possible that there are still dictated errors within this office note. If so, please bring any errors to my attention for an addendum.   All efforts were made to ensure that this office note is accurate.

## 2020-08-05 NOTE — FLOWSHEET NOTE
lifting, ambulation and eccentric single leg control. NMR and Therapeutic Activities:    [x] (57943 or 64039) Provided verbal/tactile cueing for activities related to improving balance, coordination, kinesthetic sense, posture, motor skill, proprioception and motor activation to allow for proper function of core, proximal hip and LE with self care and ADLs  [] (94034) Gait Re-education- Provided training and instruction to the patient for proper LE, core and proximal hip recruitment and positioning and eccentric body weight control with ambulation re-education including up and down stairs     Home Exercise Program:    [x] (05968) Reviewed/Progressed HEP activities related to strengthening, flexibility, endurance, ROM of core, proximal hip and LE for functional self-care, mobility, lifting and ambulation/stair navigation   [] (48730)Reviewed/Progressed HEP activities related to improving balance, coordination, kinesthetic sense, posture, motor skill, proprioception of core, proximal hip and LE for self care, mobility, lifting, and ambulation/stair navigation      Manual Treatments:  PROM / STM / Oscillations-Mobs:  G-I, II, III, IV (PA's, Inf., Post.)  [] (51950) Provided manual therapy to mobilize LE, proximal hip and/or LS spine soft tissue/joints for the purpose of modulating pain, promoting relaxation,  increasing ROM, reducing/eliminating soft tissue swelling/inflammation/restriction, improving soft tissue extensibility and allowing for proper ROM for normal function with self care, mobility, lifting and ambulation. Modalities:  [] (54933) Vasopneumatic compression: Utilized vasopneumatic compression to decrease edema / swelling for the purpose of improving mobility and quad tone / recruitment which will allow for increased overall function including but not limited to self-care, transfers, ambulation, and ascending / descending stairs.        Modalities:      Charges:  Timed Code Treatment Minutes: 45 Total Treatment Minutes: 45     [] EVAL - LOW (46009)   [] EVAL - MOD (18186)  [] EVAL - HIGH (10348)  [] RE-EVAL (75183)  [x] CD(62693) x  2     [] Ionto  [x] NMR (99621) x  1     [] Vaso  [] Manual (82901) x       [] Ultrasound  [] TA x        [] Mech Traction (82063)  [] Aquatic Therapy x      [] ES (un) (37704):   [] Home Management Training x  [] ES(attended) (12329)   [] Dry Needling 1-2 muscles (39459):  [] Dry Needling 3+ muscles (159772)  [] Group:      [] Other:     GOALS:  Patient stated goal: return to body weight exercising without pain  [] Progressing: [] Met: [] Not Met: [] Adjusted    Therapist goals for Patient:   Short Term Goals: To be achieved in: 2 weeks  1. Independent in HEP and progression per patient tolerance, in order to prevent re-injury. [] Progressing: [] Met: [] Not Met: [] Adjusted  2. Patient will have a decrease in pain to facilitate improvement in movement, function, and ADLs as indicated by Functional Deficits. [] Progressing: [] Met: [] Not Met: [] Adjusted    Long Term Goals: To be achieved in: 4-6 weeks  1. Disability index score of 15% or less for the LEFS to assist with reaching prior level of function. [] Progressing: [] Met: [] Not Met: [] Adjusted  2. Patient will demonstrate increased AROM to 0-120 to allow for proper joint functioning as indicated by patients Functional Deficits. [] Progressing: [] Met: [] Not Met: [] Adjusted  3. Patient will demonstrate an increase in Strength to at least 4+/5 throughout as well as good proximal hip strength and control to allow for proper functional mobility as indicated by patients Functional Deficits. [] Progressing: [] Met: [] Not Met: [] Adjusted  4. Patient will return to functional activities including walking and standing for work without increased symptoms or restriction. [] Progressing: [] Met: [] Not Met: [] Adjusted  5. Patient will return to body weight workouts without increased pain.    [] Progressing: [] Continue per plan of care [] Alter current plan (see comments)  [] Plan of care initiated [] Hold pending MD visit [] Discharge    Electronically signed by: Jean Matute [id number]      Note: If patient does not return for scheduled/ recommended follow up visits, this note will serve as a discharge from care along with most recent update on progress.

## 2020-08-10 ENCOUNTER — HOSPITAL ENCOUNTER (OUTPATIENT)
Dept: PHYSICAL THERAPY | Age: 30
Setting detail: THERAPIES SERIES
Discharge: HOME | End: 2020-08-10
Payer: COMMERCIAL

## 2020-08-10 PROCEDURE — 97110 THERAPEUTIC EXERCISES: CPT

## 2020-08-10 PROCEDURE — 97112 NEUROMUSCULAR REEDUCATION: CPT

## 2020-08-10 NOTE — FLOWSHEET NOTE
due to pain                        Leg press - w/ blue hip abd band DL50# 1 15 Added 8/10    Attempt next visit 20\" 1 7/22 increased pain          Therapeutic Activities (43785)       Step up npv?      TRX squats  1 15 Added 8/3                 Neuromuscular Re-ed (08929)       Quad sets  5\" 10 Towel roll and tactile cueing   TKE blue 1 15 Verbal cueing throughout   SAQ 2# 1 20 Start 7/22 tactile faciliation    7/22   biodex balance npv                    Manual Intervention (99278)              Tib/Fem Mobs       Patella Mobs       Ankle mobs                         Pt. Education:  -all pt questions were answered    Home Exercise Program:  Access Code: [email or web address]   URL: [email or web address]   Date: 07/15/2020   Prepared by: Brenda Espinoza     Exercises   Seated Table Hamstring Stretch - 3 reps - 30 hold - 1x daily - 7x weekly   Standing Gastroc Stretch on Step - 3 reps - 30 hold - 1x daily - 7x weekly   Long Sitting Quad Set - 10 reps - 5 hold - 1x daily - 7x weekly   Supine Straight Leg Raises - 10 reps - 3 sets - 1x daily - 7x weekly   Sidelying Hip Abduction - 10 reps - 3 sets - 1x daily - 7x weekly   Standing Terminal Knee Extension with Resistance - 10 reps - 3 sets - 1x daily - 7x weekly       Therapeutic Exercise and NMR EXR  [x] (55475) Provided verbal/tactile cueing for activities related to strengthening, flexibility, endurance, ROM for improvements in LE, proximal hip, and core control with self care, mobility, lifting, ambulation.  [] (69332) Provided verbal/tactile cueing for activities related to improving balance, coordination, kinesthetic sense, posture, motor skill, proprioception  to assist with LE, proximal hip, and core control in self care, mobility, lifting, ambulation and eccentric single leg control.   [] (00942) Therapist is in constant attendance of 2 or more patients providing skilled therapy interventions, but not providing any significant amount of measurable one-on-one time to either patient, for improvements in LE, proximal hip, and core control in self care, mobility, lifting, ambulation and eccentric single leg control. NMR and Therapeutic Activities:    [x] (24088 or 40597) Provided verbal/tactile cueing for activities related to improving balance, coordination, kinesthetic sense, posture, motor skill, proprioception and motor activation to allow for proper function of core, proximal hip and LE with self care and ADLs  [] (91826) Gait Re-education- Provided training and instruction to the patient for proper LE, core and proximal hip recruitment and positioning and eccentric body weight control with ambulation re-education including up and down stairs     Home Exercise Program:    [x] (01270) Reviewed/Progressed HEP activities related to strengthening, flexibility, endurance, ROM of core, proximal hip and LE for functional self-care, mobility, lifting and ambulation/stair navigation   [] (30585)Reviewed/Progressed HEP activities related to improving balance, coordination, kinesthetic sense, posture, motor skill, proprioception of core, proximal hip and LE for self care, mobility, lifting, and ambulation/stair navigation      Manual Treatments:  PROM / STM / Oscillations-Mobs:  G-I, II, III, IV (PA's, Inf., Post.)  [] (68152) Provided manual therapy to mobilize LE, proximal hip and/or LS spine soft tissue/joints for the purpose of modulating pain, promoting relaxation,  increasing ROM, reducing/eliminating soft tissue swelling/inflammation/restriction, improving soft tissue extensibility and allowing for proper ROM for normal function with self care, mobility, lifting and ambulation.      Modalities:  [] (63168) Vasopneumatic compression: Utilized vasopneumatic compression to decrease edema / swelling for the purpose of improving mobility and quad tone / recruitment which will allow for increased overall function including but not limited to self-care, transfers, ambulation, and Adjusted  5. Patient will return to body weight workouts without increased pain. [] Progressing: [] Met: [] Not Met: [] Adjusted    Overall Progression Towards Functional goals/ Treatment Progress Update:  [] Patient is progressing as expected towards functional goals listed. [] Progression is slowed due to complexities/Impairments listed. [] Progression has been slowed due to co-morbidities. [x] Plan just implemented, too soon to assess goals progression <30days   [] Goals require adjustment due to lack of progress  [] Patient is not progressing as expected and requires additional follow up with physician  [] Other    Persisting Functional Limitations/Impairments:  []Sitting [x]Standing   [x]Walking [x]Stairs   []Transfers [x]ADLs   [x]Squatting/bending []Kneeling  []Housework [x]Job related tasks  []Driving [x]Sports/Recreation   []Sleeping []Other:    ASSESSMENT:  Pt tolerated routine better this date as compared to previous visit but continues to have patellar pain with LAQ and addition of leg press this date. Patellar pain was decreased on leg press with addition of theraband for hip resistance to engage gluteal muscles and hip stabilizers. Pt slow with exercises and needs increased time for routine but demonstrates improved tolerance and strength as noted by ability to perform increased reps as noted in bold on above flow sheet. Pt continues to have significant weakness in quad and hips and is challenged by current routine. Pt will continue to benefit from skilled therapy to progress functional strength and LE stability in order to tolerate standing, walking, stairs and functional daily tasks without pain and restrictions.      Treatment/Activity Tolerance:  [x] Pt able to complete treatment [] Patient limited by fatique  [] Patient limited by pain  [] Patient limited by other medical complications  [] Other:      Prognosis:  [x] Good [] Fair  [] Poor    Patient Requires Follow-up: [x] Yes  [] No    Return to Play:    [x]  N/A   []  Stage 1: Intro to Strength   []  Stage 2: Return to Run and Strength   []  Stage 3: Return to Jump and Strength   []  Stage 4: Dynamic Strength and Agility   []  Stage 5: Sport Specific Training   []  Ready to Return to Play, Meets All Above Stages   []  Not Ready for Return to Sports   Comments:            PLAN: See eval. PT 1-2x / week for 4-6 weeks. [x] Continue per plan of care [] Alter current plan (see comments)  [] Plan of care initiated [] Hold pending MD visit [] Discharge    Electronically signed by: Suad Delgado      Note: If patient does not return for scheduled/ recommended follow up visits, this note will serve as a discharge from care along with most recent update on progress.

## 2020-08-12 ENCOUNTER — HOSPITAL ENCOUNTER (OUTPATIENT)
Dept: PHYSICAL THERAPY | Age: 30
Setting detail: THERAPIES SERIES
Discharge: HOME | End: 2020-08-12
Payer: COMMERCIAL

## 2020-08-12 PROCEDURE — 97112 NEUROMUSCULAR REEDUCATION: CPT | Performed by: PHYSICAL THERAPIST

## 2020-08-12 PROCEDURE — 97110 THERAPEUTIC EXERCISES: CPT | Performed by: PHYSICAL THERAPIST

## 2020-08-12 NOTE — FLOWSHEET NOTE
Carmen Beal  Phone: [phone]   Fax: [phone]    Physical Therapy Treatment Note/ Progress Report:     Date:  2020    Patient Name:  Cong Curran    :  1990  MRN: [id number]  Restrictions/Precautions:    Medical/Treatment Diagnosis Information:  Diagnosis: M23.91 (ICD-10-CM) - Patellar malalignment syndrome of right knee  Treatment Diagnosis: R knee pain, quad atrophy, hamstring tightness  Insurance/Certification information:  PT Insurance Information: [place]  Physician Information:  Referring Practitioner: Dr. Arvind Cortes of care signed (Y/N): []  Yes [x]  No     Date of Patient follow up with Physician: 2020     Progress Report: []  Yes  [x]  No     Date Range for reporting period:  Beginnin/15/2020  Ending:       Progress report due (10 Rx/or 30 days whichever is less):  NPV    Recertification due (POC duration/ or 90 days whichever is less): 2020     Visit # Insurance Allowable Auth required? Date Range   9 60 []  Yes  [x]  No n/a     Latex Allergy:  [x]NO      []YES  Preferred Language for Healthcare:   [x]English       []other:    Functional Scale:        Date assessed:  LEFS: raw score = 61; dysfunction = 24%   7/15/2020    Pain level:  3-4/10 currently     SUBJECTIVE:  Pt. Reports that her knee is a little more sore today. She was on her feet a little more during the day. Pt. States that stairs continue to get a little better. Pt. Feels that her walking speed has improved. Pt. Reports compliance with HEP.      OBJECTIVE: See eval      RESTRICTIONS/PRECAUTIONS: none     Exercises/Interventions:     Therapeutic Exercise (90067)  Resistance / level Sets/sec Reps Notes / Cues   Bike (seat position 4) L2 5'     Incline board calf stretch  30\" 3    Seated HS stretch  30\" 3    Supine ITB stretch  Start    Prone quad stretch  30\" 3 Start    Start           Bridge W/ BS 5\" 15 ^   SLR - the purpose of improving mobility and quad tone / recruitment which will allow for increased overall function including but not limited to self-care, transfers, ambulation, and ascending / descending stairs. Modalities:      Charges:  Timed Code Treatment Minutes: 43   Total Treatment Minutes: 44     [] EVAL - LOW (22973)   [] EVAL - MOD (99211)  [] EVAL - HIGH (62457)  [] RE-EVAL (31039)  [x] LC(45046) x  2     [] Ionto  [] NMR (67987) x       [] Vaso  [] Manual (12167) x       [] Ultrasound  [x] TA x 1       [] Mech Traction (47217)  [] Aquatic Therapy x      [] ES (un) (26613):   [] Home Management Training x  [] ES(attended) (25780)   [] Dry Needling 1-2 muscles (08339):  [] Dry Needling 3+ muscles (497397)  [] Group:      [] Other:     GOALS:  Patient stated goal: return to body weight exercising without pain  [] Progressing: [] Met: [] Not Met: [] Adjusted    Therapist goals for Patient:   Short Term Goals: To be achieved in: 2 weeks  1. Independent in HEP and progression per patient tolerance, in order to prevent re-injury. [] Progressing: [] Met: [] Not Met: [] Adjusted  2. Patient will have a decrease in pain to facilitate improvement in movement, function, and ADLs as indicated by Functional Deficits. [] Progressing: [] Met: [] Not Met: [] Adjusted    Long Term Goals: To be achieved in: 4-6 weeks  1. Disability index score of 15% or less for the LEFS to assist with reaching prior level of function. [] Progressing: [] Met: [] Not Met: [] Adjusted  2. Patient will demonstrate increased AROM to 0-120 to allow for proper joint functioning as indicated by patients Functional Deficits. [] Progressing: [] Met: [] Not Met: [] Adjusted  3. Patient will demonstrate an increase in Strength to at least 4+/5 throughout as well as good proximal hip strength and control to allow for proper functional mobility as indicated by patients Functional Deficits. [] Progressing: [] Met: [] Not Met: [] Adjusted  4. Patient will return to functional activities including walking and standing for work without increased symptoms or restriction. [] Progressing: [] Met: [] Not Met: [] Adjusted  5. Patient will return to body weight workouts without increased pain. [] Progressing: [] Met: [] Not Met: [] Adjusted    Overall Progression Towards Functional goals/ Treatment Progress Update:  [] Patient is progressing as expected towards functional goals listed. [] Progression is slowed due to complexities/Impairments listed. [] Progression has been slowed due to co-morbidities. [x] Plan just implemented, too soon to assess goals progression <30days   [] Goals require adjustment due to lack of progress  [] Patient is not progressing as expected and requires additional follow up with physician  [] Other    Persisting Functional Limitations/Impairments:  []Sitting [x]Standing   [x]Walking [x]Stairs   []Transfers [x]ADLs   [x]Squatting/bending []Kneeling  []Housework [x]Job related tasks  []Driving [x]Sports/Recreation   []Sleeping []Other:    ASSESSMENT: Pt. Continues to improve in tolerance for therapy. Minimal pain noted throughout session. Due to pinching with quad sets performed supine this date and symptoms subsided completely. Pt. Continues to fatigue quickly with SLR, occasional cueing required to decrease extensor lag. Slight pain with initial eccentric lowering on LAQ that improved throughout the exercise. With stairs pt. Does not full extend on R knee due to quad deficits causing abnormality with reciprocal ascending stairs. Difficulty controlling eccentric descending. Pt. Will continue to benefit from skilled therapy in order to restore functional strength and decrease pain with daily activities.      Treatment/Activity Tolerance:  [x] Pt able to complete treatment [] Patient limited by fatique  [] Patient limited by pain  [] Patient limited by other medical complications  [] Other:      Prognosis:  [x] Good [] Fair  [] Poor    Patient Requires Follow-up: [x] Yes  [] No    Return to Play:    [x]  N/A   []  Stage 1: Intro to Strength   []  Stage 2: Return to Run and Strength   []  Stage 3: Return to Jump and Strength   []  Stage 4: Dynamic Strength and Agility   []  Stage 5: Sport Specific Training   []  Ready to Return to Play, Meets All Above Stages   []  Not Ready for Return to Sports   Comments:            PLAN: See eval. PT 1-2x / week for 4-6 weeks. [x] Continue per plan of care [] Alter current plan (see comments)  [] Plan of care initiated [] Hold pending MD visit [] Discharge    Electronically signed by: Darya Medina PT      Note: If patient does not return for scheduled/ recommended follow up visits, this note will serve as a discharge from care along with most recent update on progress.

## 2020-08-17 ENCOUNTER — HOSPITAL ENCOUNTER (OUTPATIENT)
Dept: PHYSICAL THERAPY | Age: 30
Setting detail: THERAPIES SERIES
Discharge: HOME | End: 2020-08-17
Payer: COMMERCIAL

## 2020-08-17 PROCEDURE — 97110 THERAPEUTIC EXERCISES: CPT

## 2020-08-17 PROCEDURE — 97530 THERAPEUTIC ACTIVITIES: CPT

## 2020-08-17 NOTE — FLOWSHEET NOTE
OBJECTIVE:  8/17/20  PROM AROM      L R L R   Knee Flexion 133  133 135    Knee Extension 0    0- neutral          Strength (0-5) / Myotomes Left Right   Hip Flexion - seated (L1-2) 4+ 4+   Hip Abduction 5 5   Quads (L2-4) 5 4+ pain   Hamstrings 4+ 4+           Flexibility       Hamstrings (90/90) -7 -7           Girth (cm)       Mid patella 43.0 43.5   Suprapatellar 48.0 47.5       RESTRICTIONS/PRECAUTIONS: none     Exercises/Interventions:     Therapeutic Exercise (71125)  Resistance / level Sets/sec Reps Notes / Cues   Bike (seat position 4) L2 5'     Incline board calf stretch  30\" 3    Seated HS stretch  30\" 3    Supine ITB stretch  Start 7/22   Prone quad stretch  30\" 3 Start 7/24   Start 7/22          W/ BS 5\" 15 ^7/29   2# 2 10 ^7/24   2# 2 10 ^8/12    20\" 5 Start 7/29   5# 1 15 Start 7/29 limited range due to pain                        Leg press - w/ blue hip abd band DL50# 1 15 Added 8/10   Wall sit - w/ blue hip abd band  20\" 2 8/17 - slight increased pain          Therapeutic Activities (62893)       Step up L2 1 15 Added 8/12, slight pain  Cueing to maintain quad control and contraction to minimize patellar pain   TRX squats  1 15 Added 8/3    8/12   Education regarding patellar tracking, quad function and proper squat form to decrease patellar dislocation  8'     Neuromuscular Re-ed (52155)         5\" 10 Towel roll and tactile cueing    blue 1 15 Verbal cueing throughout    Start 7/22 tactile faciliation    7/22   biodex balance PS L 11 2'  Added 8/17                 Manual Intervention (23902)              Tib/Fem Mobs       Patella Mobs       Ankle mobs                         Pt. Education:  -all pt questions were answered    Home Exercise Program:  Access Code: [email or web address]   URL: [email or web address]   Date: 07/15/2020   Prepared by: Delmer Stephens     Exercises   Seated Table Hamstring Stretch - 3 reps - 30 hold - 1x daily - 7x weekly   Standing Gastroc Stretch on Step - 3 reps - 30 hold - 1x daily - 7x weekly   Long Sitting Quad Set - 10 reps - 5 hold - 1x daily - 7x weekly   Supine Straight Leg Raises - 10 reps - 3 sets - 1x daily - 7x weekly   Sidelying Hip Abduction - 10 reps - 3 sets - 1x daily - 7x weekly   Standing Terminal Knee Extension with Resistance - 10 reps - 3 sets - 1x daily - 7x weekly       Therapeutic Exercise and NMR EXR  [x] (45192) Provided verbal/tactile cueing for activities related to strengthening, flexibility, endurance, ROM for improvements in LE, proximal hip, and core control with self care, mobility, lifting, ambulation.  [] (09679) Provided verbal/tactile cueing for activities related to improving balance, coordination, kinesthetic sense, posture, motor skill, proprioception  to assist with LE, proximal hip, and core control in self care, mobility, lifting, ambulation and eccentric single leg control.   [] (52148) Therapist is in constant attendance of 2 or more patients providing skilled therapy interventions, but not providing any significant amount of measurable one-on-one time to either patient, for improvements in LE, proximal hip, and core control in self care, mobility, lifting, ambulation and eccentric single leg control.      NMR and Therapeutic Activities:    [x] (27770 or 03758) Provided verbal/tactile cueing for activities related to improving balance, coordination, kinesthetic sense, posture, motor skill, proprioception and motor activation to allow for proper function of core, proximal hip and LE with self care and ADLs  [] (16057) Gait Re-education- Provided training and instruction to the patient for proper LE, core and proximal hip recruitment and positioning and eccentric body weight control with ambulation re-education including up and down stairs     Home Exercise Program:    [x] (40981) Reviewed/Progressed HEP activities related to strengthening, flexibility, endurance, ROM of core, proximal hip and LE for functional self-care, mobility, lifting and ambulation/stair navigation   [] (72209)Reviewed/Progressed HEP activities related to improving balance, coordination, kinesthetic sense, posture, motor skill, proprioception of core, proximal hip and LE for self care, mobility, lifting, and ambulation/stair navigation      Manual Treatments:  PROM / STM / Oscillations-Mobs:  G-I, II, III, IV (PA's, Inf., Post.)  [] (33026) Provided manual therapy to mobilize LE, proximal hip and/or LS spine soft tissue/joints for the purpose of modulating pain, promoting relaxation,  increasing ROM, reducing/eliminating soft tissue swelling/inflammation/restriction, improving soft tissue extensibility and allowing for proper ROM for normal function with self care, mobility, lifting and ambulation. Modalities:  [] (78440) Vasopneumatic compression: Utilized vasopneumatic compression to decrease edema / swelling for the purpose of improving mobility and quad tone / recruitment which will allow for increased overall function including but not limited to self-care, transfers, ambulation, and ascending / descending stairs. Modalities:      Charges:  Timed Code Treatment Minutes: 45   Total Treatment Minutes: 45     [] EVAL - LOW (25706)   [] EVAL - MOD (47033)  [] EVAL - HIGH (39275)  [] RE-EVAL (08069)  [x] SP(64572) x  2     [] Ionto  [] NMR (58564) x       [] Vaso  [] Manual (78523) x       [] Ultrasound  [x] TA x 1       [] Mech Traction (26910)  [] Aquatic Therapy x      [] ES (un) (51492):   [] Home Management Training x  [] ES(attended) (76081)   [] Dry Needling 1-2 muscles (99253):  [] Dry Needling 3+ muscles (807288)  [] Group:      [] Other:     GOALS:  Patient stated goal: return to body weight exercising without pain  [x] Progressing: [] Met: [] Not Met: [] Adjusted    Therapist goals for Patient:   Short Term Goals: To be achieved in: 2 weeks  1. Independent in HEP and progression per patient tolerance, in order to prevent re-injury.    [] Progressing: [x] Met: [] Not Met: [] Adjusted  2. Patient will have a decrease in pain to facilitate improvement in movement, function, and ADLs as indicated by Functional Deficits. [x] Progressing: [] Met: [] Not Met: [] Adjusted    Long Term Goals: To be achieved in: 4-6 weeks  1. Disability index score of 15% or less for the LEFS to assist with reaching prior level of function. [] Progressing: [x] Met: [] Not Met: [] Adjusted  2. Patient will demonstrate increased AROM to 0-120 to allow for proper joint functioning as indicated by patients Functional Deficits. [] Progressing: [x] Met: [] Not Met: [] Adjusted  3. Patient will demonstrate an increase in Strength to at least 4+/5 throughout as well as good proximal hip strength and control to allow for proper functional mobility as indicated by patients Functional Deficits. [x] Progressing: [] Met: [] Not Met: [] Adjusted  4. Patient will return to functional activities including walking and standing for work without increased symptoms or restriction. [x] Progressing: [] Met: [] Not Met: [] Adjusted  5. Patient will return to body weight workouts without increased pain. [x] Progressing: [] Met: [] Not Met: [] Adjusted    Overall Progression Towards Functional goals/ Treatment Progress Update:  [x] Patient is progressing as expected towards functional goals listed. [] Progression is slowed due to complexities/Impairments listed. [] Progression has been slowed due to co-morbidities.   [] Plan just implemented, too soon to assess goals progression <30days   [] Goals require adjustment due to lack of progress  [] Patient is not progressing as expected and requires additional follow up with physician  [] Other    Persisting Functional Limitations/Impairments:  []Sitting [x]Standing   [x]Walking [x]Stairs   []Transfers [x]ADLs   [x]Squatting/bending []Kneeling  []Housework [x]Job related tasks  []Driving [x]Sports/Recreation   []Sleeping []Other:    ASSESSMENT: Pt shows significant improvement in strength, motion, flexibility and functional score this date compared to initial eval but continues to have deficits in neuromuscular control and endurance with exercises and daily functional tasks. Pt continues to have patellar pain with stairs and balance deficits with significant difficulty on biodex balance this date. Spent time discussing importance of squat form to decrease patellar dislocation and enforce proper patellar tracking as well as importance of VMO strength for proper patellar alignment. Pt continues to have deficits and increased pain with wall squats and forward step ups due to decreased endurance and quad control/strength and will continue to benefit from skilled therapy. Modified exercise session this date due to need to complete progress note and take measurements. Resume exercises as time and symptoms allow at next visit. Treatment/Activity Tolerance:  [x] Pt able to complete treatment [] Patient limited by fatique  [] Patient limited by pain  [] Patient limited by other medical complications  [] Other:      Prognosis:  [x] Good [] Fair  [] Poor    Patient Requires Follow-up: [x] Yes  [] No    Return to Play:    [x]  N/A   []  Stage 1: Intro to Strength   []  Stage 2: Return to Run and Strength   []  Stage 3: Return to Jump and Strength   []  Stage 4: Dynamic Strength and Agility   []  Stage 5: Sport Specific Training   []  Ready to Return to Play, Meets All Above Stages   []  Not Ready for Return to Sports   Comments:            PLAN: See eval. PT 1-2x / week for 4-6 weeks. [x] Continue per plan of care [] Alter current plan (see comments)  [] Plan of care initiated [] Hold pending MD visit [] Discharge    Electronically signed by: Yayo Polanco [id number]    Bladen Sep, PT      Note: If patient does not return for scheduled/ recommended follow up visits, this note will serve as a discharge from care along with most recent update on progress.

## 2020-08-19 ENCOUNTER — HOSPITAL ENCOUNTER (OUTPATIENT)
Dept: PHYSICAL THERAPY | Age: 30
Setting detail: THERAPIES SERIES
Discharge: HOME | End: 2020-08-19
Payer: COMMERCIAL

## 2020-08-19 NOTE — PROGRESS NOTES
[facility]    Physical Therapy  Cancellation/No-show Note  Patient Name:  Caryle Listen  :  1990   Date:  2020    Cancelled visits to date: 1  No-shows to date: 0    For today's appointment patient:  [x]  Cancelled  []  Rescheduled appointment  []  No-show     Reason given by patient:  []  Patient ill  []  Conflicting appointment  []  No transportation    []  Conflict with work  []  No reason given  [x]  Other:     Comments: family emergency      Phone call information:   []  Phone call made today to patient at _ time at number provided:      []  Patient answered, conversation as follows:    []  Patient did not answer, message left as follows:  []  Phone call not made today  [x]  Phone call not needed - pt contacted us to cancel and provided reason for cancellation.      Electronically signed by:  Halina Fenton PT

## 2020-08-24 ENCOUNTER — HOSPITAL ENCOUNTER (OUTPATIENT)
Dept: PHYSICAL THERAPY | Age: 30
Setting detail: THERAPIES SERIES
Discharge: HOME | End: 2020-08-24
Payer: COMMERCIAL

## 2020-08-24 PROCEDURE — 97530 THERAPEUTIC ACTIVITIES: CPT

## 2020-08-24 PROCEDURE — 97110 THERAPEUTIC EXERCISES: CPT

## 2020-08-24 NOTE — FLOWSHEET NOTE
[facility]  Phone: [phone]   Fax: [phone]    Physical Therapy Treatment Note/ Progress Report:     Date:  2020    Patient Name:  Kosta Landa    :  1990  MRN: [id number]  Restrictions/Precautions:    Medical/Treatment Diagnosis Information:  Diagnosis: M23.91 (ICD-10-CM) - Patellar malalignment syndrome of right knee  Treatment Diagnosis: R knee pain, quad atrophy, hamstring tightness  Insurance/Certification information:  PT Insurance Information: [employer]  Physician Information:  Referring Practitioner: Dr. Rock Bautista of [facility] signed (Y/N): [x]  Yes []  No     Date of Patient follow up with Physician: 2020     Progress Report: []  Yes  [x]  No     Date Range for reporting period:  Beginnin/15/2020  Endin2020      Progress report due (10 Rx/or 30 days whichever is less):        Recertification due (POC duration/ or 90 days whichever is less): 2020     Visit # Insurance Allowable Auth required? Date Range   11 60 []  Yes  [x]  No n/a     Latex Allergy:  [x]NO      []YES  Preferred Language for Healthcare:   [x]English       []other:    Functional Scale:        Date assessed:  LEFS: raw score = 74; dysfunction = 8%   2020    Pain level:  0/10 currently     SUBJECTIVE:  Still struggles with stairs but has been more focused on control and muscle activation.      OBJECTIVE:  20  PROM AROM      L R L R   Knee Flexion 133  133 135    Knee Extension 0    0- neutral          Strength (0-5) / Myotomes Left Right   Hip Flexion - seated (L1-2) 4+ 4+   Hip Abduction 5 5   Quads (L2-4) 5 4+ pain   Hamstrings 4+ 4+           Flexibility       Hamstrings (90/90) -7 -7           Girth (cm)       Mid patella 43.0 43.5   Suprapatellar 48.0 47.5       RESTRICTIONS/PRECAUTIONS: none     Exercises/Interventions:     Therapeutic Exercise (84149)  Resistance / level Sets/sec Reps Notes / Cues   Bike (seat position 4) L2 5'     Incline board calf stretch  30\" 3    Seated HS stretch  30\" 3    Supine ITB stretch  Start    Prone quad stretch  30\" 3 Start           W/ BS 5\" 15 ^   SLR - flexion 2# 2 10 ^   SLR - abduction 2# 2 10 ^   SLR+  (supine) 20\" 5 Start    LAQ 5# 1 15 Start  limited range due to pain                        Leg press - w/ blue hip abd band DL50# 1 20 Added 8/10   Wall sit - w/ blue hip abd band  20\" 2  - slight increased pain          Therapeutic Activities (93085)       Step up - resume NPV   TRX squats  1 15 Added 8/3    8/12   Education regarding patellar tracking, quad function and proper squat form to decrease patellar dislocation  8'     Neuromuscular Re-ed (33501)         5\" 10 Towel roll and tactile cueing    blue 1 15 Verbal cueing throughout   SAQ 2# 1 20 Start  tactile faciliation   biodex balance PS L 11 2'  Added                  Manual Intervention (59092)              Tib/Fem Mobs       Patella Mobs       Ankle mobs                         Pt. Education:  -all pt questions were answered    Home Exercise Program:  Access Code:    URL: [email or web address]   Date: 07/15/2020   Prepared by: Aniyah Gresham     Exercises   Seated Table Hamstring Stretch - 3 reps - 30 hold - 1x daily - 7x weekly   Standing Gastroc Stretch on Step - 3 reps - 30 hold - 1x daily - 7x weekly   Long Sitting Quad Set - 10 reps - 5 hold - 1x daily - 7x weekly   Supine Straight Leg Raises - 10 reps - 3 sets - 1x daily - 7x weekly   Sidelying Hip Abduction - 10 reps - 3 sets - 1x daily - 7x weekly   Standing Terminal Knee Extension with Resistance - 10 reps - 3 sets - 1x daily - 7x weekly       Therapeutic Exercise and NMR EXR  [x] (32060) Provided verbal/tactile cueing for activities related to strengthening, flexibility, endurance, ROM for improvements in LE, proximal hip, and core control with self care, mobility, lifting, ambulation.  [] (63288) Provided verbal/tactile cueing for activities related to improving balance, coordination, kinesthetic sense, posture, motor skill, proprioception  to assist with LE, proximal hip, and core control in self care, mobility, lifting, ambulation and eccentric single leg control.   [] (63626) Therapist is in constant attendance of 2 or more patients providing skilled therapy interventions, but not providing any significant amount of measurable one-on-one time to either patient, for improvements in LE, proximal hip, and core control in self care, mobility, lifting, ambulation and eccentric single leg control.      NMR and Therapeutic Activities:    [x] (64457 or 98825) Provided verbal/tactile cueing for activities related to improving balance, coordination, kinesthetic sense, posture, motor skill, proprioception and motor activation to allow for proper function of core, proximal hip and LE with self care and ADLs  [] (48832) Gait Re-education- Provided training and instruction to the patient for proper LE, core and proximal hip recruitment and positioning and eccentric body weight control with ambulation re-education including up and down stairs     Home Exercise Program:    [x] (23904) Reviewed/Progressed HEP activities related to strengthening, flexibility, endurance, ROM of core, proximal hip and LE for functional self-care, mobility, lifting and ambulation/stair navigation   [] (64520)Reviewed/Progressed HEP activities related to improving balance, coordination, kinesthetic sense, posture, motor skill, proprioception of core, proximal hip and LE for self care, mobility, lifting, and ambulation/stair navigation      Manual Treatments:  PROM / STM / Oscillations-Mobs:  G-I, II, III, IV (PA's, Inf., Post.)  [] (97956) Provided manual therapy to mobilize LE, proximal hip and/or LS spine soft tissue/joints for the purpose of modulating pain, promoting relaxation,  increasing ROM, reducing/eliminating soft tissue [x] Good [] Fair  [] Poor    Patient Requires Follow-up: [x] Yes  [] No    Return to Play:    [x]  N/A   []  Stage 1: Intro to Strength   []  Stage 2: Return to Run and Strength   []  Stage 3: Return to Jump and Strength   []  Stage 4: Dynamic Strength and Agility   []  Stage 5: Sport Specific Training   []  Ready to Return to Play, Meets All Above Stages   []  Not Ready for Return to Sports   Comments:            PLAN: See eval. PT 1-2x / week for 4-6 weeks. [x] Continue per plan of care [] Alter current plan (see comments)  [] Plan of care initiated [] Hold pending MD visit [] Discharge    Electronically signed by: Preet Kiser         Note: If patient does not return for scheduled/ recommended follow up visits, this note will serve as a discharge from care along with most recent update on progress.

## 2020-08-26 ENCOUNTER — HOSPITAL ENCOUNTER (OUTPATIENT)
Dept: PHYSICAL THERAPY | Age: 30
Setting detail: THERAPIES SERIES
Discharge: HOME | End: 2020-08-26
Payer: COMMERCIAL

## 2020-08-26 PROCEDURE — 97530 THERAPEUTIC ACTIVITIES: CPT

## 2020-08-26 PROCEDURE — 97110 THERAPEUTIC EXERCISES: CPT

## 2020-08-26 NOTE — FLOWSHEET NOTE
Sandi Beal  Phone: [phone]   Fax: [phone]    Physical Therapy Treatment Note/ Progress Report:     Date:  2020    Patient Name:  Desirae Casey    :  1990  MRN: [id number]  Restrictions/Precautions:    Medical/Treatment Diagnosis Information:  Diagnosis: M23.91 (ICD-10-CM) - Patellar malalignment syndrome of right knee  Treatment Diagnosis: R knee pain, quad atrophy, hamstring tightness  Insurance/Certification information:  PT Insurance Information: [place]  Physician Information:  Referring Practitioner: Dr. Raquel Wong of care signed (Y/N): [x]  Yes []  No     Date of Patient follow up with Physician: 2020     Progress Report: []  Yes  [x]  No     Date Range for reporting period:  Beginnin/15/2020  Endin2020      Progress report due (10 Rx/or 30 days whichever is less):        Recertification due (POC duration/ or 90 days whichever is less): 2020     Visit # Insurance Allowable Auth required? Date Range   12 60 []  Yes  [x]  No n/a     Latex Allergy:  [x]NO      []YES  Preferred Language for Healthcare:   [x]English       []other:    Functional Scale:        Date assessed:  LEFS: raw score = 74; dysfunction = 8%   2020    Pain level:  0/10 currently     SUBJECTIVE:  Was sore after last visit but otherwise is doing well. Still struggling with stairs.      OBJECTIVE:  20  PROM AROM      L R L R   Knee Flexion 133  133 135    Knee Extension 0    0- neutral          Strength (0-5) / Myotomes Left Right   Hip Flexion - seated (L1-2) 4+ 4+   Hip Abduction 5 5   Quads (L2-4) 5 4+ pain   Hamstrings 4+ 4+           Flexibility       Hamstrings (90/90) -7 -7           Girth (cm)       Mid patella 43.0 43.5   Suprapatellar 48.0 47.5       RESTRICTIONS/PRECAUTIONS: none     Exercises/Interventions:     Therapeutic Exercise (13372)  Resistance / level Sets/sec Reps Notes / Cues   Bike (seat verbal/tactile cueing for activities related to improving balance, coordination, kinesthetic sense, posture, motor skill, proprioception  to assist with LE, proximal hip, and core control in self care, mobility, lifting, ambulation and eccentric single leg control.   [] (56675) Therapist is in constant attendance of 2 or more patients providing skilled therapy interventions, but not providing any significant amount of measurable one-on-one time to either patient, for improvements in LE, proximal hip, and core control in self care, mobility, lifting, ambulation and eccentric single leg control.      NMR and Therapeutic Activities:    [x] (04952 or 40866) Provided verbal/tactile cueing for activities related to improving balance, coordination, kinesthetic sense, posture, motor skill, proprioception and motor activation to allow for proper function of core, proximal hip and LE with self care and ADLs  [] (60318) Gait Re-education- Provided training and instruction to the patient for proper LE, core and proximal hip recruitment and positioning and eccentric body weight control with ambulation re-education including up and down stairs     Home Exercise Program:    [x] (92410) Reviewed/Progressed HEP activities related to strengthening, flexibility, endurance, ROM of core, proximal hip and LE for functional self-care, mobility, lifting and ambulation/stair navigation   [] (15559)Reviewed/Progressed HEP activities related to improving balance, coordination, kinesthetic sense, posture, motor skill, proprioception of core, proximal hip and LE for self care, mobility, lifting, and ambulation/stair navigation      Manual Treatments:  PROM / STM / Oscillations-Mobs:  G-I, II, III, IV (PA's, Inf., Post.)  [] (36471) Provided manual therapy to mobilize LE, proximal hip and/or LS spine soft tissue/joints for the purpose of modulating pain, promoting relaxation,  increasing ROM, reducing/eliminating soft tissue swelling/inflammation/restriction, improving soft tissue extensibility and allowing for proper ROM for normal function with self care, mobility, lifting and ambulation. Modalities:  [] (28953) Vasopneumatic compression: Utilized vasopneumatic compression to decrease edema / swelling for the purpose of improving mobility and quad tone / recruitment which will allow for increased overall function including but not limited to self-care, transfers, ambulation, and ascending / descending stairs. Modalities:      Charges:  Timed Code Treatment Minutes: 45   Total Treatment Minutes: 45     [] EVAL - LOW (66356)   [] EVAL - MOD (68837)  [] EVAL - HIGH (89387)  [] RE-EVAL (83947)  [x] HE(80788) x  2     [] Ionto  [] NMR (61474) x       [] Vaso  [] Manual (89916) x       [] Ultrasound  [x] TA x 1       [] Mech Traction (92467)  [] Aquatic Therapy x      [] ES (un) (30423):   [] Home Management Training x  [] ES(attended) (88494)   [] Dry Needling 1-2 muscles (96904):  [] Dry Needling 3+ muscles (926316)  [] Group:      [] Other:     GOALS:  Patient stated goal: return to body weight exercising without pain  [x] Progressing: [] Met: [] Not Met: [] Adjusted    Therapist goals for Patient:   Short Term Goals: To be achieved in: 2 weeks  1. Independent in HEP and progression per patient tolerance, in order to prevent re-injury. [] Progressing: [x] Met: [] Not Met: [] Adjusted  2. Patient will have a decrease in pain to facilitate improvement in movement, function, and ADLs as indicated by Functional Deficits. [x] Progressing: [] Met: [] Not Met: [] Adjusted    Long Term Goals: To be achieved in: 4-6 weeks  1. Disability index score of 15% or less for the LEFS to assist with reaching prior level of function. [] Progressing: [x] Met: [] Not Met: [] Adjusted  2. Patient will demonstrate increased AROM to 0-120 to allow for proper joint functioning as indicated by patients Functional Deficits.    [] Progressing: [x] Met: [] Not Met: [] Adjusted  3. Patient will demonstrate an increase in Strength to at least 4+/5 throughout as well as good proximal hip strength and control to allow for proper functional mobility as indicated by patients Functional Deficits. [x] Progressing: [] Met: [] Not Met: [] Adjusted  4. Patient will return to functional activities including walking and standing for work without increased symptoms or restriction. [x] Progressing: [] Met: [] Not Met: [] Adjusted  5. Patient will return to body weight workouts without increased pain. [x] Progressing: [] Met: [] Not Met: [] Adjusted    Overall Progression Towards Functional goals/ Treatment Progress Update:  [x] Patient is progressing as expected towards functional goals listed. [] Progression is slowed due to complexities/Impairments listed. [] Progression has been slowed due to co-morbidities. [] Plan just implemented, too soon to assess goals progression <30days   [] Goals require adjustment due to lack of progress  [] Patient is not progressing as expected and requires additional follow up with physician  [] Other    Persisting Functional Limitations/Impairments:  []Sitting [x]Standing   [x]Walking [x]Stairs   []Transfers [x]ADLs   [x]Squatting/bending []Kneeling  []Housework [x]Job related tasks  []Driving [x]Sports/Recreation   []Sleeping []Other:    ASSESSMENT: Pt continues to be challenged by current routine especially with leg press, wall sits and forward step ups. Pt appears to be making slow but steady gains in ability to perform functional daily tasks. Continues to demonstrate weakness in quad and hesitancy to upgrade exercises due to fear of pain in knee. Continue to upgrade routine as tolerated to promote increased quad strength, patellar stability and endurance in order to perform functional daily tasks without pain.      Treatment/Activity Tolerance:  [x] Pt able to complete treatment [] Patient limited by [place]  [] Patient limited by pain  [] Patient limited by other medical complications  [] Other:      Prognosis:  [x] Good [] Fair  [] Poor    Patient Requires Follow-up: [x] Yes  [] No    Return to Play:    [x]  N/A   []  Stage 1: Intro to Strength   []  Stage 2: Return to Run and Strength   []  Stage 3: Return to Jump and Strength   []  Stage 4: Dynamic Strength and Agility   []  Stage 5: Sport Specific Training   []  Ready to Return to Play, Meets All Above Stages   []  Not Ready for Return to Sports   Comments:            PLAN: See eval. PT 1-2x / week for 4-6 weeks. [x] Continue per plan of care [] Alter current plan (see comments)  [] Plan of care initiated [] Hold pending MD visit [] Discharge    Electronically signed by: Cayla Pepper         Note: If patient does not return for scheduled/ recommended follow up visits, this note will serve as a discharge from care along with most recent update on progress.

## 2020-08-31 ENCOUNTER — HOSPITAL ENCOUNTER (OUTPATIENT)
Dept: PHYSICAL THERAPY | Age: 30
Setting detail: THERAPIES SERIES
Discharge: HOME | End: 2020-08-31
Payer: COMMERCIAL

## 2020-08-31 PROCEDURE — 97530 THERAPEUTIC ACTIVITIES: CPT

## 2020-08-31 PROCEDURE — 97110 THERAPEUTIC EXERCISES: CPT

## 2020-08-31 NOTE — FLOWSHEET NOTE
soft tissue swelling/inflammation/restriction, improving soft tissue extensibility and allowing for proper ROM for normal function with self care, mobility, lifting and ambulation. Modalities:  [] (34226) Vasopneumatic compression: Utilized vasopneumatic compression to decrease edema / swelling for the purpose of improving mobility and quad tone / recruitment which will allow for increased overall function including but not limited to self-care, transfers, ambulation, and ascending / descending stairs. Modalities:      Charges:  Timed Code Treatment Minutes: 40   Total Treatment Minutes: 40     [] EVAL - LOW (50851)   [] EVAL - MOD (47451)  [] EVAL - HIGH (41354)  [] RE-EVAL (70753)  [x] IM(69127) x  2     [] Ionto  [] NMR (17701) x       [] Vaso  [] Manual (29583) x       [] Ultrasound  [x] TA x 1       [] Mech Traction (99316)  [] Aquatic Therapy x      [] ES (un) (18138):   [] Home Management Training x  [] ES(attended) (45048)   [] Dry Needling 1-2 muscles (40986):  [] Dry Needling 3+ muscles (270330)  [] Group:      [] Other:     GOALS:  Patient stated goal: return to body weight exercising without pain  [x] Progressing: [] Met: [] Not Met: [] Adjusted    Therapist goals for Patient:   Short Term Goals: To be achieved in: 2 weeks  1. Independent in HEP and progression per patient tolerance, in order to prevent re-injury. [] Progressing: [x] Met: [] Not Met: [] Adjusted  2. Patient will have a decrease in pain to facilitate improvement in movement, function, and ADLs as indicated by Functional Deficits. [x] Progressing: [] Met: [] Not Met: [] Adjusted    Long Term Goals: To be achieved in: 4-6 weeks  1. Disability index score of 15% or less for the LEFS to assist with reaching prior level of function. [] Progressing: [x] Met: [] Not Met: [] Adjusted  2. Patient will demonstrate increased AROM to 0-120 to allow for proper joint functioning as indicated by patients Functional Deficits.    [] Progressing: [x] Met: [] Not Met: [] Adjusted  3. Patient will demonstrate an increase in Strength to at least 4+/5 throughout as well as good proximal hip strength and control to allow for proper functional mobility as indicated by patients Functional Deficits. [x] Progressing: [] Met: [] Not Met: [] Adjusted  4. Patient will return to functional activities including walking and standing for work without increased symptoms or restriction. [x] Progressing: [] Met: [] Not Met: [] Adjusted  5. Patient will return to body weight workouts without increased pain. [x] Progressing: [] Met: [] Not Met: [] Adjusted    Overall Progression Towards Functional goals/ Treatment Progress Update:  [x] Patient is progressing as expected towards functional goals listed. [] Progression is slowed due to complexities/Impairments listed. [] Progression has been slowed due to co-morbidities. [] Plan just implemented, too soon to assess goals progression <30days   [] Goals require adjustment due to lack of progress  [] Patient is not progressing as expected and requires additional follow up with physician  [] Other    Persisting Functional Limitations/Impairments:  []Sitting [x]Standing   [x]Walking [x]Stairs   []Transfers [x]ADLs   [x]Squatting/bending []Kneeling  []Housework [x]Job related tasks  []Driving [x]Sports/Recreation   []Sleeping []Other:    ASSESSMENT: Pt demonstrates fatigue and continued infrapatellar pain with routine but demonstrates increased ease performing exercises. Will plan to upgrade routine next visit as tolerated to continue to promote increased quad function and stability with improved functional tolerance to weight bearing tasks and decreased pain. Pt continues to have difficulty with work tasks and standing/walking for prolonged periods of time. Continue with therapy to progress toward goals with improved tolerance and strength in order to perform daily tasks without restrictions. Treatment/Activity Tolerance:  [x] Pt able to complete treatment [] Patient limited by [place]  [] Patient limited by pain  [] Patient limited by other medical complications  [] Other:      Prognosis:  [x] Good [] Fair  [] Poor    Patient Requires Follow-up: [x] Yes  [] No    Return to Play:    [x]  N/A   []  Stage 1: Intro to Strength   []  Stage 2: Return to Run and Strength   []  Stage 3: Return to Jump and Strength   []  Stage 4: Dynamic Strength and Agility   []  Stage 5: Sport Specific Training   []  Ready to Return to Play, Meets All Above Stages   []  Not Ready for Return to Sports   Comments:            PLAN: See eval. PT 1-2x / week for 4-6 weeks. [x] Continue per plan of care [] Alter current plan (see comments)  [] Plan of care initiated [] Hold pending MD visit [] Discharge    Electronically signed by: Karry Babinski         Note: If patient does not return for scheduled/ recommended follow up visits, this note will serve as a discharge from care along with most recent update on progress.

## 2020-09-02 ENCOUNTER — HOSPITAL ENCOUNTER (OUTPATIENT)
Dept: PHYSICAL THERAPY | Age: 30
Setting detail: THERAPIES SERIES
Discharge: HOME | End: 2020-09-02
Payer: COMMERCIAL

## 2020-09-02 PROCEDURE — 97530 THERAPEUTIC ACTIVITIES: CPT

## 2020-09-02 PROCEDURE — 97112 NEUROMUSCULAR REEDUCATION: CPT

## 2020-09-02 PROCEDURE — 97110 THERAPEUTIC EXERCISES: CPT

## 2020-09-02 NOTE — FLOWSHEET NOTE
control with self care, mobility, lifting, ambulation.  [] (61860) Provided verbal/tactile cueing for activities related to improving balance, coordination, kinesthetic sense, posture, motor skill, proprioception  to assist with LE, proximal hip, and core control in self care, mobility, lifting, ambulation and eccentric single leg control.   [] (41661) Therapist is in constant attendance of 2 or more patients providing skilled therapy interventions, but not providing any significant amount of measurable one-on-one time to either patient, for improvements in LE, proximal hip, and core control in self care, mobility, lifting, ambulation and eccentric single leg control.      NMR and Therapeutic Activities:    [x] (83672 or 51441) Provided verbal/tactile cueing for activities related to improving balance, coordination, kinesthetic sense, posture, motor skill, proprioception and motor activation to allow for proper function of core, proximal hip and LE with self care and ADLs  [] (62623) Gait Re-education- Provided training and instruction to the patient for proper LE, core and proximal hip recruitment and positioning and eccentric body weight control with ambulation re-education including up and down stairs     Home Exercise Program:    [x] (72974) Reviewed/Progressed HEP activities related to strengthening, flexibility, endurance, ROM of core, proximal hip and LE for functional self-care, mobility, lifting and ambulation/stair navigation   [] (01512)Reviewed/Progressed HEP activities related to improving balance, coordination, kinesthetic sense, posture, motor skill, proprioception of core, proximal hip and LE for self care, mobility, lifting, and ambulation/stair navigation      Manual Treatments:  PROM / STM / Oscillations-Mobs:  G-I, II, III, IV (PA's, Inf., Post.)  [] (27801) Provided manual therapy to mobilize LE, proximal hip and/or LS spine soft tissue/joints for the purpose of modulating pain, promoting relaxation,  increasing ROM, reducing/eliminating soft tissue swelling/inflammation/restriction, improving soft tissue extensibility and allowing for proper ROM for normal function with self care, mobility, lifting and ambulation. Modalities:  [] (94108) Vasopneumatic compression: Utilized vasopneumatic compression to decrease edema / swelling for the purpose of improving mobility and quad tone / recruitment which will allow for increased overall function including but not limited to self-care, transfers, ambulation, and ascending / descending stairs. Modalities:      Charges:  Timed Code Treatment Minutes: 45   Total Treatment Minutes: 45     [] EVAL - LOW (40193)   [] EVAL - MOD (78475)  [] EVAL - HIGH (93259)  [] RE-EVAL (92163)  [x] DL(80646) x  2     [] Ionto  [] NMR (36528) x       [] Vaso  [] Manual (40177) x       [] Ultrasound  [x] TA x 1       [] Mech Traction (57758)  [] Aquatic Therapy x      [] ES (un) (76737):   [] Home Management Training x  [] ES(attended) (00799)   [] Dry Needling 1-2 muscles (41043):  [] Dry Needling 3+ muscles (627001)  [] Group:      [] Other:     GOALS:  Patient stated goal: return to body weight exercising without pain  [x] Progressing: [] Met: [] Not Met: [] Adjusted    Therapist goals for Patient:   Short Term Goals: To be achieved in: 2 weeks  1. Independent in HEP and progression per patient tolerance, in order to prevent re-injury. [] Progressing: [x] Met: [] Not Met: [] Adjusted  2. Patient will have a decrease in pain to facilitate improvement in movement, function, and ADLs as indicated by Functional Deficits. [x] Progressing: [] Met: [] Not Met: [] Adjusted    Long Term Goals: To be achieved in: 4-6 weeks  1. Disability index score of 15% or less for the LEFS to assist with reaching prior level of function. [] Progressing: [x] Met: [] Not Met: [] Adjusted  2.  Patient will demonstrate increased AROM to 0-120 to allow for proper joint functioning as indicated by patients Functional Deficits. [] Progressing: [x] Met: [] Not Met: [] Adjusted  3. Patient will demonstrate an increase in Strength to at least 4+/5 throughout as well as good proximal hip strength and control to allow for proper functional mobility as indicated by patients Functional Deficits. [x] Progressing: [] Met: [] Not Met: [] Adjusted  4. Patient will return to functional activities including walking and standing for work without increased symptoms or restriction. [x] Progressing: [] Met: [] Not Met: [] Adjusted  5. Patient will return to body weight workouts without increased pain. [x] Progressing: [] Met: [] Not Met: [] Adjusted    Overall Progression Towards Functional goals/ Treatment Progress Update:  [x] Patient is progressing as expected towards functional goals listed. [] Progression is slowed due to complexities/Impairments listed. [] Progression has been slowed due to co-morbidities. [] Plan just implemented, too soon to assess goals progression <30days   [] Goals require adjustment due to lack of progress  [] Patient is not progressing as expected and requires additional follow up with physician  [] Other    Persisting Functional Limitations/Impairments:  []Sitting [x]Standing   [x]Walking [x]Stairs   []Transfers [x]ADLs   [x]Squatting/bending []Kneeling  []Housework [x]Job related tasks  []Driving [x]Sports/Recreation   []Sleeping []Other:    ASSESSMENT: Upgrades to routine noted in bold today on above flow sheet. Pt challenged and fatigued with upgrades with some increased soreness noted with forward step ups on increased height but pt able to perform all requested reps. Attempted lateral side stepping this date with resistance band but pt unable to perform exercises due to report of increased patellar pain. Pt continues to demonstrate quad weakness and limitations with neuromuscular control especially with dynamic activity.   Continue to upgrade exercises to promote

## 2020-09-11 ENCOUNTER — HOSPITAL ENCOUNTER (OUTPATIENT)
Dept: PHYSICAL THERAPY | Age: 30
Setting detail: THERAPIES SERIES
Discharge: HOME | End: 2020-09-11
Payer: COMMERCIAL

## 2020-09-11 NOTE — PROGRESS NOTES
[address] [facility]    Physical Therapy  Cancellation/No-show Note  Patient Name:  Kosta Landa  :  1990   Date:  2020    Cancelled visits to date: 2  No-shows to date: 0    For today's appointment patient:  [x]  Cancelled  []  Rescheduled appointment  []  No-show     Reason given by patient:  [x]  Patient ill  []  Conflicting appointment  []  No transportation    []  Conflict with work  []  No reason given  []  Other:     Comments:      Phone call information:   []  Phone call made today to patient at _ time at number provided:      []  Patient answered, conversation as follows:    []  Patient did not answer, message left as follows:  []  Phone call not made today  [x]  Phone call not needed - pt contacted us to cancel and provided reason for cancellation.      Electronically signed by:  Xu Gallegos PT

## 2020-10-26 RX ORDER — TRAZODONE HYDROCHLORIDE 100 MG/1
TABLET ORAL
Qty: 30 TABLET | Refills: 5 | Status: SHIPPED | OUTPATIENT
Start: 2020-10-26 | End: 2021-04-19

## 2020-12-28 ENCOUNTER — APPOINTMENT (OUTPATIENT)
Dept: GENERAL RADIOLOGY | Age: 30
End: 2020-12-28
Payer: COMMERCIAL

## 2020-12-28 ENCOUNTER — HOSPITAL ENCOUNTER (EMERGENCY)
Age: 30
Discharge: HOME | End: 2020-12-28
Attending: EMERGENCY MEDICINE
Payer: COMMERCIAL

## 2020-12-28 VITALS
HEIGHT: 67 IN | SYSTOLIC BLOOD PRESSURE: 131 MMHG | BODY MASS INDEX: 31.39 KG/M2 | OXYGEN SATURATION: 100 % | WEIGHT: 200 LBS | TEMPERATURE: 98.5 F | RESPIRATION RATE: 17 BRPM | HEART RATE: 72 BPM | DIASTOLIC BLOOD PRESSURE: 84 MMHG

## 2020-12-28 LAB
A/G RATIO: 1 (ref 1.1–2.2)
ALBUMIN SERPL-MCNC: 3.8 G/DL (ref 3.4–5)
ALP BLD-CCNC: 34 U/L (ref 40–129)
ALT SERPL-CCNC: 14 U/L (ref 10–40)
ANION GAP SERPL CALCULATED.3IONS-SCNC: 8 MMOL/L (ref 3–16)
AST SERPL-CCNC: 39 U/L (ref 15–37)
BASOPHILS ABSOLUTE: 0 K/UL (ref 0–0.2)
BASOPHILS RELATIVE PERCENT: 0.3 %
BILIRUB SERPL-MCNC: <0.2 MG/DL (ref 0–1)
BUN BLDV-MCNC: 12 MG/DL (ref 7–20)
CALCIUM SERPL-MCNC: 9.1 MG/DL (ref 8.3–10.6)
CHLORIDE BLD-SCNC: 105 MMOL/L (ref 99–110)
CO2: 20 MMOL/L (ref 21–32)
CREAT SERPL-MCNC: 0.7 MG/DL (ref 0.6–1.1)
EKG ATRIAL RATE: 73 BPM
EKG DIAGNOSIS: NORMAL
EKG P AXIS: 47 DEGREES
EKG P-R INTERVAL: 148 MS
EKG Q-T INTERVAL: 382 MS
EKG QRS DURATION: 82 MS
EKG QTC CALCULATION (BAZETT): 420 MS
EKG R AXIS: 61 DEGREES
EKG T AXIS: 42 DEGREES
EKG VENTRICULAR RATE: 73 BPM
EOSINOPHILS ABSOLUTE: 0.1 K/UL (ref 0–0.6)
EOSINOPHILS RELATIVE PERCENT: 1 %
GFR AFRICAN AMERICAN: >60
GFR NON-AFRICAN AMERICAN: >60
GLOBULIN: 4 G/DL
GLUCOSE BLD-MCNC: 86 MG/DL (ref 70–99)
HCG QUALITATIVE: NEGATIVE
HCT VFR BLD CALC: 43.8 % (ref 36–48)
HEMOGLOBIN: 13.7 G/DL (ref 12–16)
LYMPHOCYTES ABSOLUTE: 1.6 K/UL (ref 1–5.1)
LYMPHOCYTES RELATIVE PERCENT: 21 %
MCH RBC QN AUTO: 28.5 PG (ref 26–34)
MCHC RBC AUTO-ENTMCNC: 31.4 G/DL (ref 31–36)
MCV RBC AUTO: 90.7 FL (ref 80–100)
MONOCYTES ABSOLUTE: 0.3 K/UL (ref 0–1.3)
MONOCYTES RELATIVE PERCENT: 4 %
NEUTROPHILS ABSOLUTE: 5.6 K/UL (ref 1.7–7.7)
NEUTROPHILS RELATIVE PERCENT: 73.7 %
PDW BLD-RTO: 13.8 % (ref 12.4–15.4)
PLATELET # BLD: 204 K/UL (ref 135–450)
PMV BLD AUTO: 9.5 FL (ref 5–10.5)
POTASSIUM REFLEX MAGNESIUM: 5.4 MMOL/L (ref 3.5–5.1)
RBC # BLD: 4.82 M/UL (ref 4–5.2)
SODIUM BLD-SCNC: 133 MMOL/L (ref 136–145)
TOTAL PROTEIN: 7.8 G/DL (ref 6.4–8.2)
TROPONIN: <0.01 NG/ML
WBC # BLD: 7.6 K/UL (ref 4–11)

## 2020-12-28 PROCEDURE — 36415 COLL VENOUS BLD VENIPUNCTURE: CPT

## 2020-12-28 PROCEDURE — 80053 COMPREHEN METABOLIC PANEL: CPT

## 2020-12-28 PROCEDURE — 71046 X-RAY EXAM CHEST 2 VIEWS: CPT

## 2020-12-28 PROCEDURE — 99282 EMERGENCY DEPT VISIT SF MDM: CPT

## 2020-12-28 PROCEDURE — 84703 CHORIONIC GONADOTROPIN ASSAY: CPT

## 2020-12-28 PROCEDURE — 93005 ELECTROCARDIOGRAM TRACING: CPT | Performed by: EMERGENCY MEDICINE

## 2020-12-28 PROCEDURE — 85025 COMPLETE CBC W/AUTO DIFF WBC: CPT

## 2020-12-28 PROCEDURE — 84484 ASSAY OF TROPONIN QUANT: CPT

## 2020-12-28 PROCEDURE — 93010 ELECTROCARDIOGRAM REPORT: CPT | Performed by: INTERNAL MEDICINE

## 2020-12-28 RX ORDER — NAPROXEN 500 MG/1
500 TABLET ORAL 2 TIMES DAILY WITH MEALS
Qty: 30 TABLET | Refills: 0 | Status: SHIPPED | OUTPATIENT
Start: 2020-12-28 | End: 2021-04-29

## 2020-12-28 ASSESSMENT — PAIN DESCRIPTION - ORIENTATION: ORIENTATION: LEFT

## 2020-12-28 ASSESSMENT — ENCOUNTER SYMPTOMS
NAUSEA: 0
RESPIRATORY NEGATIVE: 1
SHORTNESS OF BREATH: 0
COUGH: 0
PHOTOPHOBIA: 0
DIARRHEA: 0
VOMITING: 0
COLOR CHANGE: 0
BACK PAIN: 0
CONSTIPATION: 0
ABDOMINAL PAIN: 0

## 2020-12-28 ASSESSMENT — PAIN DESCRIPTION - PAIN TYPE: TYPE: ACUTE PAIN

## 2020-12-28 ASSESSMENT — PAIN DESCRIPTION - LOCATION: LOCATION: CHEST

## 2020-12-28 ASSESSMENT — PAIN SCALES - GENERAL: PAINLEVEL_OUTOF10: 6

## 2020-12-28 NOTE — ED NOTES
Reviewed discharge instructions with patient, verbalized understanding, ambulatory at time of discharge.         Melanie Blackwell RN  12/28/20 5421

## 2020-12-28 NOTE — ED NOTES
Bed: 27  Expected date:   Expected time:   Means of arrival: Walk In  Comments:     Prashant Darden RN  12/28/20 6274

## 2020-12-28 NOTE — ED PROVIDER NOTES
[facility]        Pt Name: Marco Antonio Zayas  MRN: [id number]  [place] 1990  Date of evaluation: 12/28/2020  Provider: MARÍA Sanchez Cap  PCP: Ezequiel Moya MD     I have seen and evaluated this patient with my supervising physician Rupinder Espinal, [address] [place] [address]       Chief Complaint   Patient presents with    Chest Pain     chest pain under left breast, of and on for a couple of days, more consistent since Saturday. left hand is swelling and tingling, started in elbow and her hand feels different, she noticed it when she woke up at 730 this morning. HISTORY OF PRESENT ILLNESS   (Location, Timing/Onset, Context/Setting, Quality, Duration, Modifying Factors, Severity, Associated Signs and Symptoms)  Note limiting factors. Marco Antonio Zayas is a 27 y.o. female with past medical history of IBS who presents to the ED with complaint of chest pain. Patient H has pain to her substernal chest that radiates under her left breast.  States is worse with palpation of the chest.  Patient states been ongoing for the past couple of days intermittently. Patient states that seem to be more consistent since the weekend. Denies any pleuritic pain, shortness of breath, orthopnea, pedal edema or calf tenderness. Denies cough or hemoptysis. Denies fever chills. Denies rashes or lesions. Patient states she woke up this morning and states she felt she had some numbness and tingling to her left hand. States it felt swollen. States that since improved. Denies any history of carpal tunnel/cubital tunnel in the past.  Denies any injury or trauma. Denies decreased range of motion strength. Denies any other numbness or tingling. Denies headache, lightheadedness/dizziness, visual changes, speech disturbances or focal weakness. Denies ecchymosis, erythema or warmth. Denies abrasion or laceration. Denies rashes or lesions.   Patient became concerned and came to the ED for further evaluation and treatment. States aching pain rated 6/10 to the areas listed above. Nursing Notes were all reviewed and agreed with or any disagreements were addressed in the HPI. REVIEW OF SYSTEMS    (2-9 systems for level 4, 10 or more for level 5)     Review of Systems   Constitutional: Negative for activity change, appetite change, chills, diaphoresis, fatigue and fever. Eyes: Negative for photophobia and visual disturbance. Respiratory: Negative. Negative for cough and shortness of breath. Cardiovascular: Positive for chest pain. Negative for palpitations and leg swelling. Gastrointestinal: Negative for abdominal pain, constipation, diarrhea, nausea and vomiting. Genitourinary: Negative for decreased urine volume, difficulty urinating, dysuria, flank pain, frequency, hematuria and urgency. Musculoskeletal: Positive for myalgias. Negative for arthralgias, back pain, neck pain and neck stiffness. Skin: Negative for color change, pallor, rash and wound. Neurological: Positive for numbness. Negative for dizziness, tremors, seizures, syncope, facial asymmetry, speech difficulty, weakness, light-headedness and headaches. Positives and Pertinent negatives as per HPI. Except as noted above in the ROS, all other systems were reviewed and negative.        PAST MEDICAL HISTORY     Past Medical History:   Diagnosis Date    IBS (irritable bowel syndrome)          SURGICAL HISTORY     Past Surgical History:   Procedure Laterality Date    BREAST LUMPECTOMY      Benign     CYST REMOVAL      WISDOM TOOTH EXTRACTION           CURRENTMEDICATIONS       Discharge Medication List as of 12/28/2020  3:22 PM      CONTINUE these medications which have NOT CHANGED    Details   linaclotide (LINZESS) 145 MCG capsule Take 145 mcg by mouth every morning (before breakfast)Historical Med      traZODone (DESYREL) 100 MG tablet TAKE 1 TABLET BY MOUTH EVERY NIGHT, Disp-30 tablet, R-5Normal      Norgestim-Eth Estrad Triphasic 0.18/0.215/0.25 MG-35 MCG TABS Take by mouthHistorical Med      fluticasone (FLONASE) 50 MCG/ACT nasal spray 2 sprays by Nasal routeHistorical Med      omeprazole (PRILOSEC) 20 MG delayed release capsule Take one tablet one hour before dinner then one tablet one hour before breakfastHistorical Med               ALLERGIES     Azithromycin and Ibuprofen    FAMILYHISTORY       Family History   Problem Relation Age of Onset    Hypertension Mother    [facility] Other Mother         Fibroid    No Known Problems Father     Other Half-Sister         Short stature     Diabetes Maternal Grandmother     Cancer Maternal Grandmother     Stroke Maternal Grandmother     High Cholesterol Maternal Grandmother           SOCIAL HISTORY       Social History     Tobacco Use    Smoking status: Never Smoker    Smokeless tobacco: Never Used   Substance Use Topics    Alcohol use: Yes     Alcohol/week: 2.0 standard drinks     Types: 2 Glasses of wine per week     Frequency: 2-4 times a month     Drinks per session: 1 or 2     Binge frequency: Never     Comment: occ    Drug use: Never       SCREENINGS   NIH Stroke Scale  NIH Stroke Scale Assessed: Yes  Interval: Baseline  Level of Consciousness (1a. ): Alert  LOC Questions (1b. ):  Answers both correctly  LOC Commands (1c. ): Performs both tasks correctly  Best Gaze (2. ): Normal  Visual (3. ): No visual loss  Facial Palsy (4. ): Normal symmetrical movement  Motor Arm, Left (5a. ): No drift  Motor Arm, Right (5b. ): No drift  Motor Leg, Left (6a. ): No drift  Motor Leg, Right (6b. ): No drift  Limb Ataxia (7. ): Absent  Sensory (8. ): Normal  Best Language (9. ): No aphasia  Dysarthria (10. ): Normal  Extinction and Inattention (11): No abnormality  Total: 0         PHYSICAL EXAM    (up to 7 for level 4, 8 or more for level 5)     ED Triage Vitals [12/28/20 0941]   BP Temp Temp Source Pulse Resp SpO2 Height Weight   130/82 98.5 °F (36.9 °C) Oral 81 16 98 % 5' 7\" (1.702 m) 200 lb (90.7 kg)       Physical Exam  Constitutional:       General: She is not in acute distress. Appearance: Normal appearance. She is well-developed. She is not ill-appearing, toxic-appearing or diaphoretic. HENT:      Head: Normocephalic and atraumatic. Right Ear: External ear normal.      Left Ear: External ear normal.   Eyes:      General:         Right eye: No discharge. Left eye: No discharge. Extraocular Movements: Extraocular movements intact. Conjunctiva/sclera: Conjunctivae normal.      Pupils: Pupils are equal, round, and reactive to light. Neck:      Musculoskeletal: Normal range of motion and neck supple. Cardiovascular:      Rate and Rhythm: Normal rate and regular rhythm. Pulses: Normal pulses. Heart sounds: Normal heart sounds. No murmur. No friction rub. No gallop. Comments: 2+ radial pulses bilaterally. No pedal edema. No calf tenderness. No JVD. Pulmonary:      Effort: Pulmonary effort is normal. No respiratory distress. Breath sounds: Normal breath sounds. No stridor. No wheezing, rhonchi or rales. Comments: Reproducible tenderness to palpation over the anterior chest.  Chest:      Chest wall: Tenderness present. Abdominal:      General: Abdomen is flat. Bowel sounds are normal. There is no distension. Palpations: Abdomen is soft. There is no mass. Tenderness: There is no abdominal tenderness. There is no right CVA tenderness, left CVA tenderness, guarding or rebound. Hernia: No hernia is present. Musculoskeletal: Normal range of motion. Comments: Full range of motion and strength to the shoulder, elbow and wrist.  Negative Spurling sign. No tenderness to palpation over the midline cervical spine or associated paraspinal musculature. No tenderness palpation over the left upper extremity throughout. Radial pulse and capillary refill brisk.   Sensation intact to the radial ulnar aspects of distal fingertips. Full range of motion strength in distal median, ulnar and radial nerve distribution. Compartment soft. Negative Tinel's over the carpal tunnel but positive Tinel's over the cubital tunnel. No edema, ecchymosis, erythema or warmth noted. No abrasion or laceration. No rashes or lesions. Skin:     General: Skin is warm and dry. Coloration: Skin is not pale. Findings: No erythema or rash. Neurological:      General: No focal deficit present. Mental Status: She is alert and oriented to person, place, and time. GCS: GCS eye subscore is 4. GCS verbal subscore is 5. GCS motor subscore is 6. Cranial Nerves: Cranial nerves are intact. No cranial nerve deficit. Sensory: No sensory deficit. Motor: Motor function is intact. Gait: Gait is intact.  Gait normal.   Psychiatric:         Behavior: Behavior normal.         DIAGNOSTIC RESULTS   LABS:    Labs Reviewed   COMPREHENSIVE METABOLIC PANEL W/ REFLEX TO MG FOR LOW K - Abnormal; Notable for the following components:       Result Value    Sodium 133 (*)     Potassium reflex Magnesium 5.4 (*)     CO2 20 (*)     Albumin/Globulin Ratio 1.0 (*)     Alkaline Phosphatase 34 (*)     AST 39 (*)     All other components within normal limits    Narrative:     Performed at:  [facility] [address]  [place]   Phone [phone]   HCG, SERUM, QUALITATIVE    Narrative:     Performed at:  [facility] [address]  [place]   Phone [phone]   CBC WITH AUTO DIFFERENTIAL    Narrative:     Performed at:  [facility] [address]  [place]   Phone [phone]   TROPONIN    Narrative:     Performed at:  [facility] [address],  [place]   Phone [phone]       All other labs were within normal range or not returned as of this dictation. EKG: All EKG's are interpreted by the Emergency Department Physician in the absence of a cardiologist.  Please see their note for interpretation of EKG. RADIOLOGY:   Non-plain film images such as CT, Ultrasound and MRI are read by the radiologist. Plain radiographic images are visualized and preliminarily interpreted by the ED Provider with the below findings:        Interpretation per the Radiologist below, if available at the time of this note:    XR CHEST (2 VW)   Final Result   No acute cardiopulmonary disease. Xr Chest (2 Vw)    Result Date: 12/28/2020  EXAMINATION: TWO XRAY VIEWS OF THE CHEST 12/28/2020 11:37 am COMPARISON: None. HISTORY: ORDERING SYSTEM PROVIDED HISTORY: chest pain TECHNOLOGIST PROVIDED HISTORY: Reason for exam:->chest pain Reason for Exam: Chest Pain (chest pain under left breast, of and on for a couple of days, more consistent since Saturday. left hand is swelling and tingling, started in elbow and her hand feels different, she noticed it when she woke up at 730 this morning.) Acuity: Acute Type of Exam: Initial FINDINGS: Frontal and lateral views of the chest were performed. There is no acute skeletal abnormality. There is a mild scoliosis of the upper thoracic spine. The heart size and mediastinal contours are within normal limits. The lungs are clear, without evidence of acute airspace consolidation, pneumothorax, or pleural effusion. No acute cardiopulmonary disease.            PROCEDURES   Unless otherwise noted below, none     Procedures    CRITICAL CARE TIME   N/A    CONSULTS:  None      EMERGENCY DEPARTMENT COURSE and DIFFERENTIAL DIAGNOSIS/MDM:   Vitals:    Vitals:    12/28/20 1415 12/28/20 1430 12/28/20 1445 12/28/20 1500   BP:  130/78  131/84   Pulse: 64 67 70 72   Resp: 12 16 17 17   Temp:       TempSrc:       SpO2: 99% 99% 100% 100%   Weight:       Height:           Patient was given the following medications:  Medications - No data to display        Patient is a 68-year-old female who presents to the ED with complaint of chest discomfort. Has had intermittent chest discomfort for the past couple of days. Upon examination reproducible tender location of the anterior chest.  Believe most likely musculoskeletal etiology. Work-up initiated. EKG interpreted by attending. Serum pregnancy negative. CBC showed normal white count, hemoglobin and platelets. CMP showed potassium of 5.4 but specimen was hemolyzed. Most likely elevated secondary to hemolysis. Normal kidney function. Troponin normal.  Chest x-ray unremarkable. Given history and physical examination suffering from chest wall discomfort. Patient states she woke up today with numbness to her left arm. Has reproducible Tinel's over the cubital tunnel and concern for ulnar nerve entrapment at this time. Symptoms have improved with reassuring neurologic semination here in the ED. Do not believe further work-up or imaging indicated this time. Believe can be safely discharged home. Low suspicion for CVA, TIA, subarachnoid hemorrhage, subdural hematoma, meningitis, encephalitis, intracranial abnormality, AAA, dissection, ACS, PE, pneumonia, pneumothorax, respiratory stress, GERD, anxiety, CHF, COPD, asthma, surgical abdomen or other emergent etiology at this time. Low suspicion for DVT, arterial occlusion, tendon involvement, vascular compromise, compartment syndrome or other emergent etiology at this time. FINAL IMPRESSION      1. Chest wall discomfort    2. Arm numbness left          DISPOSITION/PLAN   DISPOSITION Decision To Discharge 12/28/2020 03:11:19 PM      PATIENT REFERREDTO:  Melissa Cintron MD  [address] [place] [address]  [phone]    Schedule an appointment as soon as possible for a visit   For a Re-check in   3-5   days.     [facility] Emergency Department  [place]

## 2020-12-30 NOTE — ED PROVIDER NOTES
I independently performed a history and physical on [facility]. All diagnostic, treatment, and disposition decisions were made by myself in conjunction with the advanced practice provider. Briefly, this is a 27 y.o. female here for left sided chest pain. Reports tingling sensations in her left arm and hand as well. .    On exam, Patient afebrile and nontoxic. No distress. Heart RRR. Lungs CTAB. 5/5 motor and sensation grossly intact BUE's. Radial 2+ and symmetric. +Tinel sign. EKG  EKG was reviewed by emergency department physician in the absence of a cardiologist    Narrow complex sinus rhythm, rate 73, normal axis, normal AL and QRS intervals, normal Qtc, no ST elevations or depressions, normal t-wave morphology, impression NSR, no STEMI      Screenings  NIH Stroke Scale  NIH Stroke Scale Assessed: Yes  Interval: Baseline  Level of Consciousness (1a. ): Alert  LOC Questions (1b. ): Answers both correctly  LOC Commands (1c. ): Performs both tasks correctly  Best Gaze (2. ): Normal  Visual (3. ): No visual loss  Facial Palsy (4. ): Normal symmetrical movement  Motor Arm, Left (5a. ): No drift  Motor Arm, Right (5b. ): No drift  Motor Leg, Left (6a. ): No drift  Motor Leg, Right (6b. ): No drift  Limb Ataxia (7. ): Absent  Sensory (8. ): Normal  Best Language (9. ): No aphasia  Dysarthria (10. ): Normal  Extinction and Inattention (11): No abnormality  Total: 0         MDM      Causes of chest pain considered including but not limited to: ACS, pulmonary embolism, aortic dissection, pneumothorax, pneumonia, costochondritis/musculoskeletal, gastroesophageal reflux    Patient nontoxic and in no distress. EKG no STEMI, a troponin is normal. Patient is low risk by HEART score and ACS not supported. No tachycardia or respiratory symptoms and low risk for pulmonary embolism. CXR with no acute findings, no consolidation or pneumothorax. No findings to suggest aortic dissection.  Patient's pain well-controlled and felt safe for discharge to self-care with close outpatient follow up. Patient is agreeable with plan to discharge and all questions were answered. Strict return precautions including worsening/changing chest pain, vomiting, shortness of breath, fevers, palpitations or syncope were discussed. Patient Referrals:  Rob Kim MD  [address] [facility] [address] [address]   [phone]    Schedule an appointment as soon as possible for a visit   For a Re-check in   3-5   days. [facility] Emergency Department  [facility]  [address] [address]  [phone]  Go to   As needed, If symptoms worsen      Discharge Medications:  Discharge Medication List as of 12/28/2020  3:22 PM      START taking these medications    Details   naproxen (NAPROSYN) 500 MG tablet Take 1 tablet by mouth 2 times daily (with meals), Disp-30 tablet, R-0Print             FINAL IMPRESSION  1. Chest wall discomfort    2. Arm numbness left        Blood pressure 131/84, pulse 72, temperature 98.5 °F (36.9 °C), temperature source Oral, resp. rate 17, height 5' 7\" (1.702 m), weight 200 lb (90.7 kg), last menstrual period 11/28/2020, SpO2 100 %. For further details of [facility] encounter, please see documentation by advanced practice provider, MARÍA Gonzalez.     Ann Marie Zhong DO (electronically signed)  Attending Emergency Physician       Ann Marie Zhong DO  12/29/20 1933

## 2021-01-29 ENCOUNTER — OFFICE VISIT (OUTPATIENT)
Dept: INTERNAL MEDICINE CLINIC | Age: 31
End: 2021-01-29
Payer: COMMERCIAL

## 2021-01-29 VITALS
WEIGHT: 206 LBS | DIASTOLIC BLOOD PRESSURE: 76 MMHG | BODY MASS INDEX: 32.26 KG/M2 | SYSTOLIC BLOOD PRESSURE: 122 MMHG | HEART RATE: 85 BPM | OXYGEN SATURATION: 98 %

## 2021-01-29 DIAGNOSIS — R07.9 CHEST PAIN, UNSPECIFIED TYPE: ICD-10-CM

## 2021-01-29 DIAGNOSIS — F51.05 INSOMNIA DUE TO OTHER MENTAL DISORDER: Primary | ICD-10-CM

## 2021-01-29 DIAGNOSIS — F99 INSOMNIA DUE TO OTHER MENTAL DISORDER: Primary | ICD-10-CM

## 2021-01-29 LAB — SARS-COV-2 ANTIBODY, TOTAL: NEGATIVE

## 2021-01-29 PROCEDURE — 99213 OFFICE O/P EST LOW 20 MIN: CPT | Performed by: INTERNAL MEDICINE

## 2021-01-29 PROCEDURE — G8427 DOCREV CUR MEDS BY ELIG CLIN: HCPCS | Performed by: INTERNAL MEDICINE

## 2021-01-29 PROCEDURE — G8484 FLU IMMUNIZE NO ADMIN: HCPCS | Performed by: INTERNAL MEDICINE

## 2021-01-29 PROCEDURE — 1036F TOBACCO NON-USER: CPT | Performed by: INTERNAL MEDICINE

## 2021-01-29 PROCEDURE — G8417 CALC BMI ABV UP PARAM F/U: HCPCS | Performed by: INTERNAL MEDICINE

## 2021-01-29 ASSESSMENT — PATIENT HEALTH QUESTIONNAIRE - PHQ9: SUM OF ALL RESPONSES TO PHQ QUESTIONS 1-9: 0

## 2021-01-29 NOTE — PROGRESS NOTES
Kiko Sanderson (:  1990) is a 32 y.o. female,Established patient, here for evaluation of the following chief complaint(s): Insomnia      ASSESSMENT/PLAN:  1. Chest pain, unspecified type      Patient Instructions   Try Melatonin 3-5 mg 1 hour before bed. If no improvement try 6-10mg   Start a prenatal vitamin   Check COVID antibody            Return in about 3 months (around 2021) for Well Exam .    SUBJECTIVE/OBJECTIVE:  HPI  Sleep  \"It's fine\"  Getting  May 30- stress of wedding planning. Takes it nightly     Seen in ER for CP  Comes and goes  Feels like she wants to press it out. Nothing triggers it  Lasts 1 hour  Eating does not make of difference     The week of  it is daily    Anxiety- mild a few weeks ago- no chest pain    Review of Systems   Constitutional: Negative for fatigue and fever. HENT: Negative for ear pain, hearing loss, postnasal drip, rhinorrhea, sinus pressure, sore throat and tinnitus. Eyes: Negative for redness. Respiratory: Negative for cough, chest tightness, shortness of breath and wheezing. Cardiovascular: Positive for chest pain. Negative for palpitations and leg swelling. Gastrointestinal: Negative for abdominal pain, constipation, diarrhea, nausea and vomiting. Genitourinary: Negative for dysuria and frequency. Musculoskeletal: Negative for arthralgias, back pain and joint swelling. Skin: Negative for rash. Neurological: Negative for dizziness, syncope and headaches. Physical Exam  Constitutional:       General: She is not in acute distress. Appearance: She is well-developed. HENT:      Head: Normocephalic. Mouth/Throat:      Pharynx: No oropharyngeal exudate. Cardiovascular:      Rate and Rhythm: Normal rate and regular rhythm. Heart sounds: Normal heart sounds. No murmur. Pulmonary:      Effort: Pulmonary effort is normal.      Breath sounds: Normal breath sounds.    Abdominal: General: There is no distension. Palpations: Abdomen is soft. Tenderness: There is no abdominal tenderness. Skin:     General: Skin is warm. Findings: No rash. An electronic signature was used to authenticate this note. --Patricia Larios MD     Documentation was done using voice recognition dragon software. Every effort was made to ensure accuracy; however, inadvertent, unintentional computerized transcription errors may be present.

## 2021-01-29 NOTE — PATIENT INSTRUCTIONS
Try Melatonin 3-5 mg 1 hour before bed.   If no improvement try 6-10mg   Start a prenatal vitamin   Check COVID antibody

## 2021-02-11 PROBLEM — R07.9 CHEST PAIN: Status: ACTIVE | Noted: 2021-02-11

## 2021-02-11 ASSESSMENT — ENCOUNTER SYMPTOMS
SINUS PRESSURE: 0
WHEEZING: 0
COUGH: 0
ABDOMINAL PAIN: 0
SORE THROAT: 0
SHORTNESS OF BREATH: 0
BACK PAIN: 0
CONSTIPATION: 0
DIARRHEA: 0
EYE REDNESS: 0
VOMITING: 0
CHEST TIGHTNESS: 0
NAUSEA: 0
RHINORRHEA: 0

## 2021-04-15 DIAGNOSIS — F99 INSOMNIA DUE TO OTHER MENTAL DISORDER: ICD-10-CM

## 2021-04-15 DIAGNOSIS — F51.05 INSOMNIA DUE TO OTHER MENTAL DISORDER: ICD-10-CM

## 2021-04-19 RX ORDER — TRAZODONE HYDROCHLORIDE 100 MG/1
TABLET ORAL
Qty: 30 TABLET | Refills: 5 | Status: SHIPPED | OUTPATIENT
Start: 2021-04-19 | End: 2021-10-29 | Stop reason: SDUPTHER

## 2021-04-29 ENCOUNTER — OFFICE VISIT (OUTPATIENT)
Dept: INTERNAL MEDICINE CLINIC | Age: 31
End: 2021-04-29
Payer: COMMERCIAL

## 2021-04-29 VITALS
HEART RATE: 86 BPM | DIASTOLIC BLOOD PRESSURE: 68 MMHG | WEIGHT: 208 LBS | OXYGEN SATURATION: 96 % | BODY MASS INDEX: 32.58 KG/M2 | SYSTOLIC BLOOD PRESSURE: 122 MMHG

## 2021-04-29 DIAGNOSIS — Z00.00 WELL ADULT EXAM: Primary | ICD-10-CM

## 2021-04-29 PROCEDURE — 99395 PREV VISIT EST AGE 18-39: CPT | Performed by: INTERNAL MEDICINE

## 2021-04-29 RX ORDER — LINACLOTIDE 290 UG/1
CAPSULE, GELATIN COATED ORAL
COMMUNITY
Start: 2021-04-19 | End: 2022-04-29 | Stop reason: ALTCHOICE

## 2021-04-29 ASSESSMENT — PATIENT HEALTH QUESTIONNAIRE - PHQ9: SUM OF ALL RESPONSES TO PHQ QUESTIONS 1-9: 0

## 2021-04-29 NOTE — PROGRESS NOTES
History and Physical      Tigre Painter  YOB: 1990    Date of Service:  4/29/2021    Chief Complaint:   Tigre Painter is a 32 y.o. female who presents for complete physical examination. HPI: Getting  in [facility] next month. Sleep comes and goes. Not keeping routine as much. Planning on taking a nap today.    Mood- anxiety is high    Bladder  Having urge   She has had accidents   Started two years ago  Does Daphnie     Wt Readings from Last 3 Encounters:   04/29/21 208 lb (94.3 kg)   01/29/21 206 lb (93.4 kg)   12/28/20 200 lb (90.7 kg)     BP Readings from Last 3 Encounters:   04/29/21 122/68   01/29/21 122/76   12/28/20 131/84       Patient Active Problem List   Diagnosis    Insomnia due to other mental disorder    Chest pain       Preventive Care:  Health Maintenance   Topic Date Due    COVID-19 Vaccine (2 - Pfizer 2-dose series) 05/13/2021    Flu vaccine (Season Ended) 01/29/2022 (Originally 9/1/2021)    Cervical cancer screen  07/02/2024    DTaP/Tdap/Td vaccine (4 - Td) 07/29/2029    Hepatitis C screen  Completed    HIV screen  Completed    Hepatitis A vaccine  Aged Out    Hepatitis B vaccine  Aged Out    Hib vaccine  Aged Out    Meningococcal (ACWY) vaccine  Aged Out    Pneumococcal 0-64 years Vaccine  Aged Out    Varicella vaccine  Discontinued      Hx abnormal PAP: yes - had a colposcopy- ok   Self-breast exams: yes  Last eye exam: goes annually, abnormal - glasses   Last dentist exam: Goes regularly   Exercise: no regular exercise and has a new elliptical   Servings of veggies per day: more veggies   Sugary beverages: yes- when stressed   Red Meat intake: none  Seatbelt use: yes  Lipid panel:   Lab Results   Component Value Date    CHOL 180 02/14/2020    TRIG 86 02/14/2020    HDL 63 (H) 02/14/2020    LDLCALC 100 (H) 02/14/2020        Advance Directive: N, <no information>    Immunization History   Administered Date(s) Administered    HPV Quadrivalent (Gardasil) 08/18/2008    Tdap (Boostrix, Adacel) 01/26/2001, 07/25/2007, 07/29/2019       Allergies   Allergen Reactions    Azithromycin Shortness Of Breath    Ibuprofen Nausea And Vomiting     Outpatient Medications Marked as Taking for the 4/29/21 encounter (Office Visit) with Shasha Carlson MD   Medication Sig Dispense Refill    LINZESS 290 MCG CAPS capsule TAKE 1 CAPSULE BY MOUTH DAILY      traZODone (DESYREL) 100 MG tablet TAKE 1 TABLET BY MOUTH EVERY NIGHT 30 tablet 5    Norgestim-Eth Estrad Triphasic 0.18/0.215/0.25 MG-35 MCG TABS Take by mouth      fluticasone (FLONASE) 50 MCG/ACT nasal spray 2 sprays by Nasal route         Past Medical History:   Diagnosis Date    IBS (irritable bowel syndrome)     Insomnia      Past Surgical History:   Procedure Laterality Date    BREAST LUMPECTOMY Right     Benign     COLPOSCOPY  2013    Normal    CYST REMOVAL      Groin    WISDOM TOOTH EXTRACTION       Family History   Problem Relation Age of Onset    Hypertension Mother    Phoebe Conine Other Mother         Fibroid    No Known Problems Father     Other Half-Sister         Short stature;  Autism     Diabetes Maternal Grandmother     Cancer Maternal Grandmother         Gallbladder     Stroke Maternal Grandmother     High Cholesterol Maternal Grandmother     No Known Problems Maternal Grandfather         Unknown     Hypertension Paternal Grandmother     Elevated Lipids Paternal Grandmother     Arrhythmia Paternal Grandmother     Heart Attack Paternal Grandfather     Prostate Cancer Paternal Uncle         In Remission     Cancer Paternal Uncle         Some type of blood cancer requiring a BMT     Colon Cancer Neg Hx     Breast Cancer Neg Hx      Social History     Socioeconomic History    Marital status: Life Partner     Spouse name: Not on file    Number of children: 0    Years of education: Not on file    Highest education level: Not on file   Occupational History    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 Tobacco Use    Smoking status: Never Smoker    Smokeless tobacco: Never Used   Vaping Use    Vaping Use: Never used   Substance and Sexual Activity    Alcohol use: Yes     Alcohol/week: 2.0 standard drinks     Types: 2 Glasses of wine per week     Comment: occ    Drug use: Never    Sexual activity: Yes     Partners: Male     Birth control/protection: Pill   Other Topics Concern    Not on file   Social History Narrative    Lives with boyDavid Cedeño- July 29, 2019     Engaged- January 30, 2020     Wedding date is May 30, 2021     Social Determinants of Health     Financial Resource Strain:     Difficulty of Paying Living Expenses:    Food Insecurity:     Worried About Running Out of Food in the Last Year:     [address] in the Last Year:    Transportation Needs:     Lack of Transportation (Medical):  Lack of Transportation (Non-Medical):    Physical Activity:     Days of Exercise per Week:     Minutes of Exercise per Session:    Stress:     Feeling of Stress :    Social Connections:     Frequency of Communication with Friends and Family:     Frequency of Social Gatherings with Friends and Family:     Attends Denominational Services:     Active Member of Clubs or Organizations:     Attends Club or Organization Meetings:     Marital Status:    Intimate Partner Violence:     Fear of Current or Ex-Partner:     Emotionally Abused:     Physically Abused:     Sexually Abused:        Review of Systems:  A comprehensive review of systems was negative except for what was noted in the HPI. Physical Exam:   Vitals:    04/29/21 1336   BP: 122/68   Pulse: 86   SpO2: 96%   Weight: 208 lb (94.3 kg)     Body mass index is 32.58 kg/m². There were no vitals filed for this visit. Constitutional: She is oriented to person, place, and time. She appears well-developed and well-nourished. No distress. HEENT:   Head: Normocephalic and atraumatic.    Right Ear: Tympanic membrane, external ear and ear canal normal.   Left Ear: Tympanic membrane, external ear and ear canal normal.   Nose: Nose normal.   Mouth/Throat: Oropharynx is clear and moist, and mucous membranes are normal.  There is no cervical adenopathy. Eyes: Conjunctivae and extraocular motions are normal. Pupils are equal, round, and reactive to light. Neck: Neck supple. No JVD present. Carotid bruit is not present. No mass and no thyromegaly present. Cardiovascular: Normal rate, regular rhythm, normal heart sounds and intact distal pulses. Exam reveals no gallop and no friction rub. No murmur heard. Pulmonary/Chest: Effort normal and breath sounds normal. No respiratory distress. She has no wheezes, rhonchi or rales. Abdominal: Soft, non-tender. Bowel sounds and aorta are normal. She exhibits no organomegaly, mass or bruit. Musculoskeletal: Normal range of motion, no synovitis. She exhibits no edema. Neurological: She is alert and oriented to person, place, and time. She has normal reflexes. No cranial nerve deficit. Coordination normal.   Skin: Skin is warm and dry. There is no rash or erythema. No suspicious lesions noted. Psychiatric: She has a normal mood and affect. Her speech is normal and behavior is normal. Judgment, cognition and memory are normal.     Assessment/Plan:    Juju Rodriguez was seen today for annual exam.    Diagnoses and all orders for this visit:    Well adult exam            Patient Instructions     Use saline and vaseline inside your nose    Your EKG was normal which is reassuring       Patient Education        Bladder Training: Care Instructions  Your Care Instructions     Bladder training is used to treat urge incontinence and stress incontinence. Urge incontinence means that the need to urinate comes on so fast that you can't get to a toilet in time. Stress incontinence means that you leak urine because of pressure on your bladder. For example, it may happen when you laugh, cough, or lift something heavy.   Bladder training can increase how long you can wait before you have to urinate. It can also help your bladder hold more urine. And it can give you better control over the urge to urinate. It is important to remember that bladder training takes a few weeks to a few months to make a difference. You may not see results right away, but don't give up. Follow-up care is a key part of your treatment and safety. Be sure to make and go to all appointments, and call your doctor if you are having problems. It's also a good idea to know your test results and keep a list of the medicines you take. How can you care for yourself at home? Work with your doctor to come up with a bladder training program that is right for you. You may use one or more of the following methods. Delayed urination  · In the beginning, try to keep from urinating for 5 minutes after you first feel the need to go. · While you wait, take deep, slow breaths to relax. Kegel exercises can also help you delay the need to go to the bathroom. · After some practice, when you can easily wait 5 minutes to urinate, try to wait 10 minutes before you urinate. · Slowly increase the waiting period until you are able to control when you have to urinate. Scheduled urination  · Empty your bladder when you first wake up in the morning. · Schedule times throughout the day when you will urinate. · Start by going to the bathroom every hour, even if you don't need to go. · Slowly increase the time between trips to the bathroom. · When you have found a schedule that works well for you, keep doing it. · If you wake up during the night and have to urinate, do it. Apply your schedule to waking hours only. Kegel exercises  These tighten and strengthen pelvic muscles, which can help you control the flow of urine. To do Kegel exercises:  · Squeeze the same muscles you would use to stop your urine. Your belly and thighs should not move.   · Hold the squeeze for 3 seconds, and then relax for 3 seconds. · Start with 3 seconds. Then add 1 second each week until you are able to squeeze for 10 seconds. · Repeat the exercise 10 to 15 times a session. Do three or more sessions a day. When should you call for help? Watch closely for changes in your health, and be sure to contact your doctor if:    · Your incontinence is getting worse.     · You do not get better as expected. Where can you learn more? Go to https://ROAM DatapeLitbloc.Kynetx. org and sign in to your [email or web address] account. Enter A894 in the RipCode box to learn more about \"Bladder Training: Care Instructions. \"     If you do not have an account, please click on the \"Sign Up Now\" link. Current as of: June 29, 2020               Content Version: 12.8  © 6359-1272 [employer]wise, Incorporated. Care instructions adapted under license by [facility] ([facility]). If you have questions about a medical condition or this instruction, always ask your healthcare professional. [place] [address] any warranty or liability for your use of this information. Don Wang MD, MD      Documentation was done using voice recognition dragon software. Every effort was made to ensure accuracy; however, inadvertent, unintentional computerized transcription errors may be present.

## 2021-10-29 ENCOUNTER — OFFICE VISIT (OUTPATIENT)
Dept: INTERNAL MEDICINE CLINIC | Age: 31
End: 2021-10-29
Payer: COMMERCIAL

## 2021-10-29 VITALS
BODY MASS INDEX: 32.26 KG/M2 | SYSTOLIC BLOOD PRESSURE: 108 MMHG | WEIGHT: 206 LBS | OXYGEN SATURATION: 96 % | HEART RATE: 85 BPM | DIASTOLIC BLOOD PRESSURE: 60 MMHG

## 2021-10-29 DIAGNOSIS — F51.05 INSOMNIA DUE TO OTHER MENTAL DISORDER: Primary | ICD-10-CM

## 2021-10-29 DIAGNOSIS — H91.92 HEARING LOSS OF LEFT EAR, UNSPECIFIED HEARING LOSS TYPE: ICD-10-CM

## 2021-10-29 DIAGNOSIS — F99 INSOMNIA DUE TO OTHER MENTAL DISORDER: Primary | ICD-10-CM

## 2021-10-29 PROCEDURE — G8484 FLU IMMUNIZE NO ADMIN: HCPCS | Performed by: INTERNAL MEDICINE

## 2021-10-29 PROCEDURE — G8427 DOCREV CUR MEDS BY ELIG CLIN: HCPCS | Performed by: INTERNAL MEDICINE

## 2021-10-29 PROCEDURE — 99213 OFFICE O/P EST LOW 20 MIN: CPT | Performed by: INTERNAL MEDICINE

## 2021-10-29 PROCEDURE — G8417 CALC BMI ABV UP PARAM F/U: HCPCS | Performed by: INTERNAL MEDICINE

## 2021-10-29 PROCEDURE — 1036F TOBACCO NON-USER: CPT | Performed by: INTERNAL MEDICINE

## 2021-10-29 RX ORDER — TRAZODONE HYDROCHLORIDE 100 MG/1
TABLET ORAL
Qty: 30 TABLET | Refills: 5 | Status: SHIPPED | OUTPATIENT
Start: 2021-10-29 | End: 2022-06-06

## 2021-10-29 NOTE — PROGRESS NOTES
Marbella Madison (:  1990) is a 32 y.o. female,Established patient, here for evaluation of the following chief complaint(s): Insomnia      ASSESSMENT/PLAN:  1. Insomnia due to other mental disorder  -     traZODone (DESYREL) 100 MG tablet; TAKE 1 TABLET BY MOUTH EVERY NIGHT, Disp-30 tablet, R-5Normal  2. Hearing loss of left ear, unspecified hearing loss type  Assessment & Plan:   Normal hearing exam      Patient Instructions   Continue Trazodone as needed        Return in about 6 months (around 2022) for well exam.    SUBJECTIVE/OBJECTIVE:  HPI   Insomnia  Sleep is good  Trying to keep to a routine  In bed at 10 PM, up 6:15-7  Feels regulated. Sleep onset is 15-20 minutes  Stays asleep  Does not feel rested. Taking trazodone nightly       Had one night when she was up re-arranging kitchen    Mood is good. Uncle  earlier this month  Appetite: started weight watchers. Exercise- Ellipitcal and walking    She has her family, friends, work group   Doing well with being . Going to try to conceive     Review of Systems   All other systems reviewed and are negative. Vitals:    10/29/21 1359   BP: 108/60   Pulse: 85   SpO2: 96%   Weight: 206 lb (93.4 kg)      Wt Readings from Last 3 Encounters:   10/29/21 206 lb (93.4 kg)   21 208 lb (94.3 kg)   21 206 lb (93.4 kg)        Physical Exam  HENT:      Right Ear: Tympanic membrane and ear canal normal.      Left Ear: Tympanic membrane and ear canal normal.      Nose: Nose normal.      Mouth/Throat:      Mouth: Mucous membranes are moist.   Eyes:      Pupils: Pupils are equal, round, and reactive to light. Musculoskeletal:      Cervical back: Normal range of motion and neck supple. An electronic signature was used to authenticate this note. --Laura Gonzalez MD     Documentation was done using voice recognition dragon software.   Every effort was made to ensure accuracy; however, inadvertent, unintentional

## 2021-11-01 PROBLEM — H91.92 HEARING LOSS OF LEFT EAR: Status: ACTIVE | Noted: 2021-11-01

## 2022-02-17 NOTE — PATIENT INSTRUCTIONS
Check MRI  Refer to ortho  To schedule your test call:  [facility]  Mon.-Fri. 7 a.m.- 8 p.m., Sat 8a. m.- 3 p.m. Yadi LAMAR ([phone])  Patients: Press Option 2  (a) To schedule a test, procedure or class- Press Option 1.      Rest  Ice   Compresion  Elevation     You can take naproxen or tylenol for pain
210.1

## 2022-04-29 ENCOUNTER — OFFICE VISIT (OUTPATIENT)
Dept: INTERNAL MEDICINE CLINIC | Age: 32
End: 2022-04-29
Payer: COMMERCIAL

## 2022-04-29 VITALS
BODY MASS INDEX: 32.92 KG/M2 | SYSTOLIC BLOOD PRESSURE: 110 MMHG | TEMPERATURE: 97.3 F | OXYGEN SATURATION: 99 % | HEART RATE: 61 BPM | WEIGHT: 210.2 LBS | DIASTOLIC BLOOD PRESSURE: 80 MMHG

## 2022-04-29 DIAGNOSIS — Z00.00 WELL ADULT EXAM: Primary | ICD-10-CM

## 2022-04-29 DIAGNOSIS — R10.84 GENERALIZED ABDOMINAL PAIN: ICD-10-CM

## 2022-04-29 DIAGNOSIS — F33.41 RECURRENT MAJOR DEPRESSIVE DISORDER, IN PARTIAL REMISSION (HCC): ICD-10-CM

## 2022-04-29 PROCEDURE — 99395 PREV VISIT EST AGE 18-39: CPT | Performed by: INTERNAL MEDICINE

## 2022-04-29 PROCEDURE — 81025 URINE PREGNANCY TEST: CPT | Performed by: INTERNAL MEDICINE

## 2022-04-29 SDOH — ECONOMIC STABILITY: FOOD INSECURITY: WITHIN THE PAST 12 MONTHS, THE FOOD YOU BOUGHT JUST DIDN'T LAST AND YOU DIDN'T HAVE MONEY TO GET MORE.: NEVER TRUE

## 2022-04-29 SDOH — ECONOMIC STABILITY: FOOD INSECURITY: WITHIN THE PAST 12 MONTHS, YOU WORRIED THAT YOUR FOOD WOULD RUN OUT BEFORE YOU GOT MONEY TO BUY MORE.: NEVER TRUE

## 2022-04-29 ASSESSMENT — PATIENT HEALTH QUESTIONNAIRE - PHQ9
8. MOVING OR SPEAKING SO SLOWLY THAT OTHER PEOPLE COULD HAVE NOTICED. OR THE OPPOSITE, BEING SO FIGETY OR RESTLESS THAT YOU HAVE BEEN MOVING AROUND A LOT MORE THAN USUAL: 0
SUM OF ALL RESPONSES TO PHQ QUESTIONS 1-9: 2
3. TROUBLE FALLING OR STAYING ASLEEP: 1
9. THOUGHTS THAT YOU WOULD BE BETTER OFF DEAD, OR OF HURTING YOURSELF: 0
SUM OF ALL RESPONSES TO PHQ9 QUESTIONS 1 & 2: 0
5. POOR APPETITE OR OVEREATING: 0
4. FEELING TIRED OR HAVING LITTLE ENERGY: 1
1. LITTLE INTEREST OR PLEASURE IN DOING THINGS: 0
10. IF YOU CHECKED OFF ANY PROBLEMS, HOW DIFFICULT HAVE THESE PROBLEMS MADE IT FOR YOU TO DO YOUR WORK, TAKE CARE OF THINGS AT HOME, OR GET ALONG WITH OTHER PEOPLE: 0
6. FEELING BAD ABOUT YOURSELF - OR THAT YOU ARE A FAILURE OR HAVE LET YOURSELF OR YOUR FAMILY DOWN: 0
7. TROUBLE CONCENTRATING ON THINGS, SUCH AS READING THE NEWSPAPER OR WATCHING TELEVISION: 0
2. FEELING DOWN, DEPRESSED OR HOPELESS: 0
SUM OF ALL RESPONSES TO PHQ QUESTIONS 1-9: 2

## 2022-04-29 ASSESSMENT — ANXIETY QUESTIONNAIRES
3. WORRYING TOO MUCH ABOUT DIFFERENT THINGS: 0
2. NOT BEING ABLE TO STOP OR CONTROL WORRYING: 0
IF YOU CHECKED OFF ANY PROBLEMS ON THIS QUESTIONNAIRE, HOW DIFFICULT HAVE THESE PROBLEMS MADE IT FOR YOU TO DO YOUR WORK, TAKE CARE OF THINGS AT HOME, OR GET ALONG WITH OTHER PEOPLE: NOT DIFFICULT AT ALL
6. BECOMING EASILY ANNOYED OR IRRITABLE: 0
GAD7 TOTAL SCORE: 1
5. BEING SO RESTLESS THAT IT IS HARD TO SIT STILL: 0
1. FEELING NERVOUS, ANXIOUS, OR ON EDGE: 0
7. FEELING AFRAID AS IF SOMETHING AWFUL MIGHT HAPPEN: 0
4. TROUBLE RELAXING: 1

## 2022-04-29 ASSESSMENT — SOCIAL DETERMINANTS OF HEALTH (SDOH): HOW HARD IS IT FOR YOU TO PAY FOR THE VERY BASICS LIKE FOOD, HOUSING, MEDICAL CARE, AND HEATING?: NOT HARD AT ALL

## 2022-04-29 NOTE — PATIENT INSTRUCTIONS
Check thyroid, check for anemia     Dr. Isa Whitley ([place])  [address] [facility] [employer]   Phone: [phone]  Fax: [phone].  [address]  Phone: [phone]      Abdominal pain  Check x ray  If there is a lot of stool, I recommend a bowel clean out with Miralax  If there is moderate stool, I will start [facility] [address]     If your mood worsens, we can consider Zoloft/Sertraline

## 2022-04-29 NOTE — PROGRESS NOTES
History and Physical      Anna Peralta  YOB: 1990    Date of Service:  4/29/2022    Chief Complaint:   Anna Peralta is a 28 y.o. female who presents for complete physical examination. HPI: A few weeks ago her mood was not great. She was crying, tired, not sleeping, irritable. Lasted 3 weeks   Seeing her counselor- Heydi Curran- goes monthly . Took some days of to reset. She returned to work. She thinks working a day from home will help. 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 She has been trying to concceive since November. She was tracking and it was stressing her out. Her  went to a doctor and will get a semen analysis   Periods are regular. Bleeds for 5- 6 days  Cramps are monthly   Gets pelvic pain in between periods    No hirsutism  No nipple discharge   No acne   Wt Readings from Last 3 Encounters:   04/29/22 210 lb 3.2 oz (95.3 kg)   10/29/21 206 lb (93.4 kg)   04/29/21 208 lb (94.3 kg)     BP Readings from Last 3 Encounters:   04/29/22 110/80   10/29/21 108/60   04/29/21 122/68       Patient Active Problem List   Diagnosis    Insomnia due to other mental disorder    Chest pain    Hearing loss of left ear       Preventive Care:  Health Maintenance   Topic Date Due    Depression Monitoring  04/29/2022    Flu vaccine (Season Ended) 09/01/2022    Cervical cancer screen  07/02/2024    DTaP/Tdap/Td vaccine (4 - Td or Tdap) 07/29/2029    COVID-19 Vaccine  Completed    Hepatitis C screen  Completed    HIV screen  Completed    Hepatitis A vaccine  Aged Out    Hepatitis B vaccine  Aged Out    Hib vaccine  Aged Out    Meningococcal (ACWY) vaccine  Aged Out    Pneumococcal 0-64 years Vaccine  Aged Out    Varicella vaccine  Discontinued      PAP Due: no  Mammogram due: no  Colon cancer screen due: no   Last eye exam: goes annual, abnormal-wears glasses  Last dentist exam: goes annually   Exercise: In PT.  Walking, Elliptical, Pilates    Servings of veggies per day: daily- meal preps   Servings of fruit per day: daily  Sugary beverages: no    Seatbelt use: yes  Lipid panel:   Lab Results   Component Value Date    CHOL 180 02/14/2020    TRIG 86 02/14/2020    HDL 63 (H) 02/14/2020    LDLCALC 100 (H) 02/14/2020          Immunization History   Administered Date(s) Administered    COVID-19, Pfizer Purple top, DILUTE for use, 12+ yrs, 30mcg/0.3mL dose 04/22/2021, 05/13/2021, 01/28/2022    HPV Quadrivalent (Gardasil) 08/18/2008    Tdap (Boostrix, Adacel) 01/26/2001, 07/25/2007, 07/29/2019       Allergies   Allergen Reactions    Azithromycin Shortness Of Breath    Ibuprofen Nausea And Vomiting     Outpatient Medications Marked as Taking for the 4/29/22 encounter (Office Visit) with Nazario Cuello MD   Medication Sig Dispense Refill    traZODone (DESYREL) 100 MG tablet TAKE 1 TABLET BY MOUTH EVERY NIGHT 30 tablet 5    fluticasone (FLONASE) 50 MCG/ACT nasal spray 2 sprays by Nasal route         Past Medical History:   Diagnosis Date    IBS (irritable bowel syndrome)     Insomnia      Past Surgical History:   Procedure Laterality Date    BREAST LUMPECTOMY Right     Benign     COLPOSCOPY  2013    Normal    CYST REMOVAL      Groin    WISDOM TOOTH EXTRACTION       Family History   Problem Relation Age of Onset    Hypertension Mother    Sonia Cabrera Other Mother         Fibroid    No Known Problems Father     Other Half-Sister         Short stature;  Autism     Diabetes Maternal Grandmother     Cancer Maternal Grandmother         Gallbladder     Stroke Maternal Grandmother     High Cholesterol Maternal Grandmother     No Known Problems Maternal Grandfather         Unknown     Hypertension Paternal Grandmother     Elevated Lipids Paternal Grandmother     Arrhythmia Paternal Grandmother     Heart Attack Paternal Grandfather     Prostate Cancer Paternal Uncle         In Remission     Cancer Paternal Uncle         Some type of blood cancer requiring a BMT     Colon Cancer Neg Hx     Breast Cancer Neg Hx      Social History     Socioeconomic History    Marital status: Life Partner     Spouse name: Not on file    Number of children: 0    Years of education: Not on file    Highest education level: Not on file   Occupational History    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    Tobacco Use    Smoking status: Never Smoker    Smokeless tobacco: Never Used   Vaping Use    Vaping Use: Never used   Substance and Sexual Activity    Alcohol use: Yes     Alcohol/week: 2.0 standard drinks     Types: 2 Glasses of wine per week     Comment: occ    Drug use: Never    Sexual activity: Yes     Partners: Male     Birth control/protection: Pill   Other Topics Concern    Not on file   Social History Narrative    Lives with Leighann Marie- July 29, 2019     Engaged- January 30, 2020     Wedding date is May 30, 2021     Social Determinants of Health     Financial Resource Strain: Low Risk     Difficulty of Paying Living Expenses: Not hard at all   Food Insecurity: No Food Insecurity    Worried About [address] in the Last Year: Never true    [address] in the Last Year: Never true   Transportation Needs:     Lack of Transportation (Medical): Not on file    Lack of Transportation (Non-Medical):  Not on file   Physical Activity:     Days of Exercise per Week: Not on file    Minutes of Exercise per Session: Not on file   Stress:     Feeling of Stress : Not on file   Social Connections:     Frequency of Communication with Friends and Family: Not on file    Frequency of Social Gatherings with Friends and Family: Not on file    Attends Jew Services: Not on file    Active Member of Clubs or Organizations: Not on file    Attends Club or Organization Meetings: Not on file    Marital Status: Not on file   Intimate Partner Violence:     Fear of Current or Ex-Partner: Not on file    Emotionally Abused: Not on file   Danna Jacinto Physically Abused: Not on file    Sexually Abused: Not on file   Housing Stability:     Unable to Pay for Housing in the Last Year: Not on file    Number of Places Lived in the Last Year: Not on file    Unstable Housing in the Last Year: Not on file       Review of Systems:  A comprehensive review of systems was negative except for what was noted in the HPI. Physical Exam:   Vitals:    04/29/22 1302   BP: 110/80   Site: Right Upper Arm   Position: Sitting   Cuff Size: Medium Adult   Pulse: 61   Temp: 97.3 °F (36.3 °C)   SpO2: 99%   Weight: 210 lb 3.2 oz (95.3 kg)     Body mass index is 32.92 kg/m². There were no vitals filed for this visit. Constitutional: She is oriented to person, place, and time. She appears well-developed and well-nourished. No distress. HEENT:   Head: Normocephalic and atraumatic. Right Ear: Tympanic membrane, external ear and ear canal normal.   Left Ear: Tympanic membrane, external ear and ear canal normal.   Nose: Nose normal.   Mouth/Throat: Oropharynx is clear and moist, and mucous membranes are normal.  There is no cervical adenopathy. Eyes: Conjunctivae and extraocular motions are normal. Pupils are equal, round, and reactive to light. Neck: Neck supple. No JVD present. Carotid bruit is not present. No mass and no thyromegaly present. Cardiovascular: Normal rate, regular rhythm, normal heart sounds and intact distal pulses. Exam reveals no gallop and no friction rub. No murmur heard. Pulmonary/Chest: Effort normal and breath sounds normal. No respiratory distress. She has no wheezes, rhonchi or rales. Abdominal: Soft, non-tender. Bowel sounds and aorta are normal. She exhibits no organomegaly, mass or bruit. Musculoskeletal: Normal range of motion, no synovitis. She exhibits no edema. Neurological: She is alert and oriented to person, place, and time. She has normal reflexes. No cranial nerve deficit. Coordination normal.   Skin: Skin is warm and dry.  There is no rash or erythema. No suspicious lesions noted. Psychiatric: She has a normal mood and affect. Her speech is normal and behavior is normal. Judgment, cognition and memory are normal.     Assessment/Plan:    Oliverio Falk was seen today for annual exam.    Diagnoses and all orders for this visit:    Well adult exam    Recurrent major depressive disorder, in partial remission ([facility] [address]  -     TSH with Reflex; Future  -     CBC with Auto Differential; Future    Generalized abdominal pain  -     XR ABDOMEN (KUB) (SINGLE AP VIEW); Future  -     POCT urine pregnancy            Patient Instructions   Check thyroid, check for anemia     Dr. Cameron Hoffmann ([place])  [address] [place] [address] [employer]   Phone: [phone]  Fax: [phone]. [address]  Phone: [phone]      Abdominal pain  Check x ray  If there is a lot of stool, I recommend a bowel clean out with Miralax  If there is moderate stool, I will start Milk of Magnesia     If your mood worsens, we can consider Zoloft/Sertraline           Christian Contreras MD      Documentation was done using voice recognition dragon software. Every effort was made to ensure accuracy; however, inadvertent, unintentional computerized transcription errors may be present.

## 2022-05-02 ENCOUNTER — HOSPITAL ENCOUNTER (OUTPATIENT)
Age: 32
Discharge: HOME | End: 2022-05-02
Payer: COMMERCIAL

## 2022-05-02 ENCOUNTER — HOSPITAL ENCOUNTER (OUTPATIENT)
Dept: GENERAL RADIOLOGY | Age: 32
Discharge: HOME | End: 2022-05-02
Payer: COMMERCIAL

## 2022-05-02 DIAGNOSIS — R10.84 GENERALIZED ABDOMINAL PAIN: ICD-10-CM

## 2022-05-02 DIAGNOSIS — F33.41 RECURRENT MAJOR DEPRESSIVE DISORDER, IN PARTIAL REMISSION (HCC): ICD-10-CM

## 2022-05-02 LAB
BASOPHILS ABSOLUTE: 0 K/UL (ref 0–0.2)
BASOPHILS RELATIVE PERCENT: 0.4 %
EOSINOPHILS ABSOLUTE: 0.1 K/UL (ref 0–0.6)
EOSINOPHILS RELATIVE PERCENT: 1 %
HCT VFR BLD CALC: 37.7 % (ref 36–48)
HEMOGLOBIN: 12.6 G/DL (ref 12–16)
LYMPHOCYTES ABSOLUTE: 1.7 K/UL (ref 1–5.1)
LYMPHOCYTES RELATIVE PERCENT: 24.6 %
MCH RBC QN AUTO: 28.8 PG (ref 26–34)
MCHC RBC AUTO-ENTMCNC: 33.3 G/DL (ref 31–36)
MCV RBC AUTO: 86.4 FL (ref 80–100)
MONOCYTES ABSOLUTE: 0.5 K/UL (ref 0–1.3)
MONOCYTES RELATIVE PERCENT: 6.9 %
NEUTROPHILS ABSOLUTE: 4.7 K/UL (ref 1.7–7.7)
NEUTROPHILS RELATIVE PERCENT: 67.1 %
PDW BLD-RTO: 13.1 % (ref 12.4–15.4)
PLATELET # BLD: 179 K/UL (ref 135–450)
PMV BLD AUTO: 9.8 FL (ref 5–10.5)
RBC # BLD: 4.36 M/UL (ref 4–5.2)
TSH REFLEX: 0.83 UIU/ML (ref 0.27–4.2)
WBC # BLD: 7.1 K/UL (ref 4–11)

## 2022-05-02 PROCEDURE — 74018 RADEX ABDOMEN 1 VIEW: CPT

## 2022-06-04 DIAGNOSIS — F99 INSOMNIA DUE TO OTHER MENTAL DISORDER: ICD-10-CM

## 2022-06-04 DIAGNOSIS — F51.05 INSOMNIA DUE TO OTHER MENTAL DISORDER: ICD-10-CM

## 2022-06-06 RX ORDER — TRAZODONE HYDROCHLORIDE 100 MG/1
TABLET ORAL
Qty: 30 TABLET | Refills: 5 | Status: SHIPPED | OUTPATIENT
Start: 2022-06-06 | End: 2022-10-31

## 2022-06-06 NOTE — TELEPHONE ENCOUNTER
Requested Prescriptions     Pending Prescriptions Disp Refills    traZODone (DESYREL) 100 MG tablet [Pharmacy Med Name: TRAZODONE 100MG TABLETS] 30 tablet 5     Sig: TAKE 1 TABLET BY MOUTH EVERY NIGHT     Recent Visits  Date Type Provider Dept   04/29/22 Office Visit Darwin Schwartz MD [facility]   10/29/21 Office Visit Darwin Schwartz MD [facility]   04/29/21 Office Visit Darwin Schwartz MD [facility]   01/29/21 Office Visit Darwin Schwartz MD [facility]   Showing recent visits within past 540 days with a meds authorizing provider and meeting all other requirements  Future Appointments  Date Type Provider Dept   10/24/22 Appointment Darwin Schwartz MD [facility]   Showing future appointments within next 150 days with a meds authorizing provider and meeting all other requirements       LAST APPOINTMENT  4/29/2022     traZODone (DESYREL) 100 MG tablet [[id number]]     Order Details  Dose, Route, Frequency: As Directed   Dispense Quantity: 30 tablet Refills: 5          Sig: TAKE 1 TABLET BY MOUTH EVERY NIGHT         Start Date: 10/29/21

## 2022-07-18 LAB
ABO, EXTERNAL RESULT: NORMAL
HEP B, EXTERNAL RESULT: NEGATIVE
HEPATITIS C ANTIBODY, EXTERNAL RESULT: NEGATIVE
HIV, EXTERNAL RESULT: NON REACTIVE
RH FACTOR, EXTERNAL RESULT: POSITIVE
RPR, EXTERNAL RESULT: NON REACTIVE
RUBELLA TITER, EXTERNAL RESULT: NORMAL

## 2022-10-31 ENCOUNTER — OFFICE VISIT (OUTPATIENT)
Dept: PRIMARY CARE CLINIC | Age: 32
End: 2022-10-31
Payer: COMMERCIAL

## 2022-10-31 VITALS
WEIGHT: 244.38 LBS | RESPIRATION RATE: 18 BRPM | SYSTOLIC BLOOD PRESSURE: 132 MMHG | HEIGHT: 67 IN | BODY MASS INDEX: 38.36 KG/M2 | DIASTOLIC BLOOD PRESSURE: 64 MMHG | TEMPERATURE: 97.6 F | OXYGEN SATURATION: 97 % | HEART RATE: 90 BPM

## 2022-10-31 DIAGNOSIS — F41.9 ANXIETY: Primary | ICD-10-CM

## 2022-10-31 DIAGNOSIS — Z3A.24 24 WEEKS GESTATION OF PREGNANCY: ICD-10-CM

## 2022-10-31 PROBLEM — R07.9 CHEST PAIN: Status: RESOLVED | Noted: 2021-02-11 | Resolved: 2022-10-31

## 2022-10-31 PROBLEM — F99 INSOMNIA DUE TO OTHER MENTAL DISORDER: Status: RESOLVED | Noted: 2020-03-12 | Resolved: 2022-10-31

## 2022-10-31 PROBLEM — F51.05 INSOMNIA DUE TO OTHER MENTAL DISORDER: Status: RESOLVED | Noted: 2020-03-12 | Resolved: 2022-10-31

## 2022-10-31 PROBLEM — H91.92 HEARING LOSS OF LEFT EAR: Status: RESOLVED | Noted: 2021-11-01 | Resolved: 2022-10-31

## 2022-10-31 PROCEDURE — 1036F TOBACCO NON-USER: CPT | Performed by: FAMILY MEDICINE

## 2022-10-31 PROCEDURE — G8484 FLU IMMUNIZE NO ADMIN: HCPCS | Performed by: FAMILY MEDICINE

## 2022-10-31 PROCEDURE — G8427 DOCREV CUR MEDS BY ELIG CLIN: HCPCS | Performed by: FAMILY MEDICINE

## 2022-10-31 PROCEDURE — 99204 OFFICE O/P NEW MOD 45 MIN: CPT | Performed by: FAMILY MEDICINE

## 2022-10-31 PROCEDURE — G8417 CALC BMI ABV UP PARAM F/U: HCPCS | Performed by: FAMILY MEDICINE

## 2022-10-31 RX ORDER — AZELASTINE 1 MG/ML
137 SPRAY, METERED NASAL DAILY
COMMUNITY

## 2022-10-31 SDOH — HEALTH STABILITY: PHYSICAL HEALTH: ON AVERAGE, HOW MANY DAYS PER WEEK DO YOU ENGAGE IN MODERATE TO STRENUOUS EXERCISE (LIKE A BRISK WALK)?: 4 DAYS

## 2022-10-31 SDOH — HEALTH STABILITY: PHYSICAL HEALTH: ON AVERAGE, HOW MANY MINUTES DO YOU ENGAGE IN EXERCISE AT THIS LEVEL?: 40 MIN

## 2022-10-31 ASSESSMENT — SOCIAL DETERMINANTS OF HEALTH (SDOH)
WITHIN THE LAST YEAR, HAVE TO BEEN RAPED OR FORCED TO HAVE ANY KIND OF SEXUAL ACTIVITY BY YOUR PARTNER OR EX-PARTNER?: NO
WITHIN THE LAST YEAR, HAVE YOU BEEN KICKED, HIT, SLAPPED, OR OTHERWISE PHYSICALLY HURT BY YOUR PARTNER OR EX-PARTNER?: NO
WITHIN THE LAST YEAR, HAVE YOU BEEN AFRAID OF YOUR PARTNER OR EX-PARTNER?: NO
WITHIN THE LAST YEAR, HAVE YOU BEEN HUMILIATED OR EMOTIONALLY ABUSED IN OTHER WAYS BY YOUR PARTNER OR EX-PARTNER?: NO

## 2022-10-31 ASSESSMENT — ENCOUNTER SYMPTOMS
ABDOMINAL PAIN: 0
DIARRHEA: 0
SHORTNESS OF BREATH: 0
CONSTIPATION: 0
SORE THROAT: 0
CHEST TIGHTNESS: 0
RHINORRHEA: 0
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
## 2022-12-30 ENCOUNTER — HOSPITAL ENCOUNTER (OUTPATIENT)
Age: 32
Discharge: HOME | End: 2022-12-30
Attending: OBSTETRICS & GYNECOLOGY | Admitting: OBSTETRICS & GYNECOLOGY
Payer: COMMERCIAL

## 2022-12-30 VITALS
TEMPERATURE: 98 F | HEIGHT: 67 IN | BODY MASS INDEX: 39.71 KG/M2 | SYSTOLIC BLOOD PRESSURE: 103 MMHG | DIASTOLIC BLOOD PRESSURE: 66 MMHG | WEIGHT: 253 LBS | HEART RATE: 89 BPM | RESPIRATION RATE: 16 BRPM

## 2022-12-30 PROCEDURE — 59025 FETAL NON-STRESS TEST: CPT

## 2022-12-30 PROCEDURE — 99211 OFF/OP EST MAY X REQ PHY/QHP: CPT

## 2022-12-30 NOTE — PROCEDURES
FETAL SURVEILLANCE TESTING SUMMARY    INDICATIONS:  rule out uterine contractions and s/p minor fall    OBJECTIVE RESULTS:  Fetal heart variability: moderate  Fetal Heart Rate decelerations: none  Fetal Heart Rate accelerations: yes  Baseline FHR: 140 per minute  Fetal Non-stress Test: reactive    Fetal surveillance: reassuring

## 2022-12-30 NOTE — FLOWSHEET NOTE
Pt presents to triage with complaint of recent fall today at 4pm. States she was pumping gas and went to put nozzle back and tripped. States she fell on her  hands and knees, but doesn't really remember if she hit her abdomen. Denies any ctx pain or any other body aches or pains. No abrasions or bruises noted. Abdomen soft to palpate and nontender. States active fetal movement after fall. Denies any vaginal leaking or bleeding.

## 2022-12-30 NOTE — PROGRESS NOTES
Department of Obstetrics and Gynecology  Labor and Delivery Triage Note        CHIEF COMPLAINT:  s/p fall    HISTORY OF PRESENT ILLNESS:      The patient is a 28 y.o. female 33w3d. OB History          1    Para        Term                AB        Living             SAB        IAB        Ectopic        Molar        Multiple        Live Births                  Patient presents with a chief complaint as above. Javon Hymen around 4 pm today while pumping gas, tripped and fell forward, caught self and doesn't recall if she hit her abdomen or not, normal fetal movement, no contractions or pain    Estimated Due Date:  Estimated Date of Delivery: 23    PAST MEDICAL HISTORY:   Past Medical History:   Diagnosis Date    Anxiety     Headache     IBS (irritable bowel syndrome)     Insomnia        PAST  SURGICAL HISTORY:   Past Surgical History:   Procedure Laterality Date    BREAST LUMPECTOMY Right     Benign     COLPOSCOPY  2013    Normal    CYST REMOVAL      Groin    WISDOM TOOTH EXTRACTION         SOCIAL HISTORY:     reports that she has never smoked. She has never used smokeless tobacco. She reports that she does not currently use alcohol after a past usage of about 1.0 standard drink per week. She reports that she does not use drugs. MEDICATIONS:    Prior to Admission medications    Medication Sig Start Date End Date Taking? Authorizing Provider   Prenatal MV-Min-Fe Fum-FA-DHA (PRENATAL 1 PO) Take by mouth   Yes Historical Provider, MD   azelastine (ASTELIN) 0.1 % nasal spray 137 sprays by Nasal route daily    Historical Provider, MD   fluticasone (FLONASE) 50 MCG/ACT nasal spray 2 sprays by Nasal route 10/2/15   Historical Provider, MD        PRENATAL CARE:    Complicated by: none    REVIEW OF SYSTEMS:     Pertinent items are noted in HPI. PHYSICAL EXAM:    Vital Signs: Blood pressure 124/77, pulse (!) 103, temperature 98 °F (36.7 °C), temperature source Oral, resp.  rate 18, height 5' 7\" (1.702 m), weight 253 lb (114.8 kg). Abdomen soft, non tender, consistent with her EGA. Fetal heart rate:  Baseline Heart Rate 140, accelerations:  present  long term variability:  moderate  decelerations:  absent    Contraction frequency:  0 minutes      ASSESSMENT:    33w3d.   Patient Active Problem List   Diagnosis    Mild dysplasia of cervix    Anxiety    S/p minor fall, reassuring FHT      PLAN:  Disposition:  monitor for 4 hours s/p fall  F/U Instructions: as needed if reassuring Phu Prabhakar MD  12/30/2022

## 2022-12-31 NOTE — DISCHARGE INSTRUCTIONS
Home Undelivered Discharge Instructions    After Discharge Orders:    Call physician office if you have further questions              Diet:  normal diet as tolerated    Rest: normal activity as tolerated    Other instructions: Do kick counts once a day on your baby. Choose the time of day your baby is most active. Get in a comfortable lying or sitting position and time how long it takes to feel 10 kicks, twists, turns, swishes, or rolls.  Call your physician or midwife if there have not been 10 kicks in 1 hours    Call physician or midwife, return to Labor and Delivery, call 911, or go to the nearest Emergency Room if: increased leakage or fluid, contractions more than  6 per  30 minutes, decreased fetal movement, persistent low back pain or cramping, bleeding from vaginal area, difficulty urinating, pain with urination, difficulty breathing, new calf pain, persistent headache, or vision change

## 2022-12-31 NOTE — FLOWSHEET NOTE
Written and verbal discharge instructions given. Pt verbalized understanding. Pt denies any questions at this time. Pt discharged to home in stable ambulatory undelivered condition.

## 2023-01-25 LAB — GBS, EXTERNAL RESULT: NEGATIVE

## 2023-02-20 ENCOUNTER — ANESTHESIA (OUTPATIENT)
Dept: LABOR AND DELIVERY | Age: 33
End: 2023-02-20
Payer: COMMERCIAL

## 2023-02-20 ENCOUNTER — ANESTHESIA EVENT (OUTPATIENT)
Dept: LABOR AND DELIVERY | Age: 33
End: 2023-02-20
Payer: COMMERCIAL

## 2023-02-20 ENCOUNTER — HOSPITAL ENCOUNTER (INPATIENT)
Age: 33
LOS: 3 days | Discharge: HOME | End: 2023-02-23
Attending: OBSTETRICS & GYNECOLOGY | Admitting: OBSTETRICS & GYNECOLOGY
Payer: COMMERCIAL

## 2023-02-20 PROBLEM — Z34.90 TERM PREGNANCY: Status: ACTIVE | Noted: 2023-02-20

## 2023-02-20 LAB
A/G RATIO: 1.2 (ref 1.1–2.2)
ABO/RH: NORMAL
ALBUMIN SERPL-MCNC: 3.4 G/DL (ref 3.4–5)
ALP BLD-CCNC: 145 U/L (ref 40–129)
ALT SERPL-CCNC: 8 U/L (ref 10–40)
AMPHETAMINE SCREEN, URINE: NORMAL
ANION GAP SERPL CALCULATED.3IONS-SCNC: 13 MMOL/L (ref 3–16)
ANTIBODY SCREEN: NORMAL
AST SERPL-CCNC: 11 U/L (ref 15–37)
BARBITURATE SCREEN URINE: NORMAL
BASOPHILS ABSOLUTE: 0 K/UL (ref 0–0.2)
BASOPHILS RELATIVE PERCENT: 0.4 %
BENZODIAZEPINE SCREEN, URINE: NORMAL
BILIRUB SERPL-MCNC: 0.3 MG/DL (ref 0–1)
BUN BLDV-MCNC: 10 MG/DL (ref 7–20)
BUPRENORPHINE URINE: NORMAL
CALCIUM SERPL-MCNC: 9.1 MG/DL (ref 8.3–10.6)
CANNABINOID SCREEN URINE: NORMAL
CHLORIDE BLD-SCNC: 105 MMOL/L (ref 99–110)
CO2: 20 MMOL/L (ref 21–32)
COCAINE METABOLITE SCREEN URINE: NORMAL
CREAT SERPL-MCNC: 0.6 MG/DL (ref 0.6–1.1)
EOSINOPHILS ABSOLUTE: 0.1 K/UL (ref 0–0.6)
EOSINOPHILS RELATIVE PERCENT: 0.6 %
FENTANYL SCREEN, URINE: NORMAL
GFR SERPL CREATININE-BSD FRML MDRD: >60 ML/MIN/{1.73_M2}
GLUCOSE BLD-MCNC: 94 MG/DL (ref 70–99)
HCT VFR BLD CALC: 34.5 % (ref 36–48)
HEMOGLOBIN: 11.5 G/DL (ref 12–16)
LYMPHOCYTES ABSOLUTE: 1.5 K/UL (ref 1–5.1)
LYMPHOCYTES RELATIVE PERCENT: 13.5 %
Lab: NORMAL
MCH RBC QN AUTO: 27.4 PG (ref 26–34)
MCHC RBC AUTO-ENTMCNC: 33.2 G/DL (ref 31–36)
MCV RBC AUTO: 82.6 FL (ref 80–100)
METHADONE SCREEN, URINE: NORMAL
MONOCYTES ABSOLUTE: 0.9 K/UL (ref 0–1.3)
MONOCYTES RELATIVE PERCENT: 8.2 %
NEUTROPHILS ABSOLUTE: 8.6 K/UL (ref 1.7–7.7)
NEUTROPHILS RELATIVE PERCENT: 77.3 %
OPIATE SCREEN URINE: NORMAL
OXYCODONE URINE: NORMAL
PDW BLD-RTO: 14.6 % (ref 12.4–15.4)
PH UA: 6
PHENCYCLIDINE SCREEN URINE: NORMAL
PLATELET # BLD: 151 K/UL (ref 135–450)
PMV BLD AUTO: 9.1 FL (ref 5–10.5)
POTASSIUM SERPL-SCNC: 3.8 MMOL/L (ref 3.5–5.1)
RBC # BLD: 4.18 M/UL (ref 4–5.2)
SODIUM BLD-SCNC: 138 MMOL/L (ref 136–145)
TOTAL PROTEIN: 6.2 G/DL (ref 6.4–8.2)
WBC # BLD: 11.1 K/UL (ref 4–11)

## 2023-02-20 PROCEDURE — 86900 BLOOD TYPING SEROLOGIC ABO: CPT

## 2023-02-20 PROCEDURE — 80053 COMPREHEN METABOLIC PANEL: CPT

## 2023-02-20 PROCEDURE — 86850 RBC ANTIBODY SCREEN: CPT

## 2023-02-20 PROCEDURE — 1220000000 HC SEMI PRIVATE OB R&B

## 2023-02-20 PROCEDURE — 6370000000 HC RX 637 (ALT 250 FOR IP)

## 2023-02-20 PROCEDURE — 80307 DRUG TEST PRSMV CHEM ANLYZR: CPT

## 2023-02-20 PROCEDURE — 86901 BLOOD TYPING SEROLOGIC RH(D): CPT

## 2023-02-20 PROCEDURE — 2580000003 HC RX 258: Performed by: OBSTETRICS & GYNECOLOGY

## 2023-02-20 PROCEDURE — 85025 COMPLETE CBC W/AUTO DIFF WBC: CPT

## 2023-02-20 RX ORDER — SODIUM CHLORIDE, SODIUM LACTATE, POTASSIUM CHLORIDE, CALCIUM CHLORIDE 600; 310; 30; 20 MG/100ML; MG/100ML; MG/100ML; MG/100ML
INJECTION, SOLUTION INTRAVENOUS CONTINUOUS
Status: DISCONTINUED | OUTPATIENT
Start: 2023-02-20 | End: 2023-02-21

## 2023-02-20 RX ORDER — SODIUM CHLORIDE 0.9 % (FLUSH) 0.9 %
5-40 SYRINGE (ML) INJECTION EVERY 12 HOURS SCHEDULED
Status: DISCONTINUED | OUTPATIENT
Start: 2023-02-20 | End: 2023-02-21

## 2023-02-20 RX ORDER — NALBUPHINE HCL 10 MG/ML
5 AMPUL (ML) INJECTION
Status: DISCONTINUED | OUTPATIENT
Start: 2023-02-20 | End: 2023-02-21

## 2023-02-20 RX ORDER — ONDANSETRON 2 MG/ML
4 INJECTION INTRAMUSCULAR; INTRAVENOUS EVERY 6 HOURS PRN
Status: DISCONTINUED | OUTPATIENT
Start: 2023-02-20 | End: 2023-02-21

## 2023-02-20 RX ORDER — METHYLERGONOVINE MALEATE 0.2 MG/ML
200 INJECTION INTRAVENOUS PRN
Status: DISCONTINUED | OUTPATIENT
Start: 2023-02-20 | End: 2023-02-21

## 2023-02-20 RX ORDER — DOCUSATE SODIUM 100 MG/1
100 CAPSULE, LIQUID FILLED ORAL 2 TIMES DAILY
Status: DISCONTINUED | OUTPATIENT
Start: 2023-02-20 | End: 2023-02-21

## 2023-02-20 RX ORDER — SODIUM CHLORIDE 0.9 % (FLUSH) 0.9 %
5-40 SYRINGE (ML) INJECTION PRN
Status: DISCONTINUED | OUTPATIENT
Start: 2023-02-20 | End: 2023-02-21

## 2023-02-20 RX ORDER — CARBOPROST TROMETHAMINE 250 UG/ML
250 INJECTION, SOLUTION INTRAMUSCULAR PRN
Status: DISCONTINUED | OUTPATIENT
Start: 2023-02-20 | End: 2023-02-21

## 2023-02-20 RX ORDER — NALBUPHINE HCL 10 MG/ML
10 AMPUL (ML) INJECTION
Status: DISCONTINUED | OUTPATIENT
Start: 2023-02-20 | End: 2023-02-21

## 2023-02-20 RX ORDER — SODIUM CHLORIDE, SODIUM LACTATE, POTASSIUM CHLORIDE, AND CALCIUM CHLORIDE .6; .31; .03; .02 G/100ML; G/100ML; G/100ML; G/100ML
1000 INJECTION, SOLUTION INTRAVENOUS PRN
Status: DISCONTINUED | OUTPATIENT
Start: 2023-02-20 | End: 2023-02-21

## 2023-02-20 RX ORDER — BUTORPHANOL TARTRATE 1 MG/ML
1 INJECTION, SOLUTION INTRAMUSCULAR; INTRAVENOUS
Status: DISCONTINUED | OUTPATIENT
Start: 2023-02-20 | End: 2023-02-21

## 2023-02-20 RX ORDER — ACYCLOVIR 200 MG/1
CAPSULE ORAL
Status: ON HOLD | COMMUNITY
End: 2023-02-21 | Stop reason: HOSPADM

## 2023-02-20 RX ORDER — MISOPROSTOL 100 UG/1
800 TABLET ORAL PRN
Status: DISCONTINUED | OUTPATIENT
Start: 2023-02-20 | End: 2023-02-21

## 2023-02-20 RX ORDER — SODIUM CHLORIDE 9 MG/ML
25 INJECTION, SOLUTION INTRAVENOUS PRN
Status: DISCONTINUED | OUTPATIENT
Start: 2023-02-20 | End: 2023-02-21

## 2023-02-20 RX ORDER — SODIUM CHLORIDE, SODIUM LACTATE, POTASSIUM CHLORIDE, AND CALCIUM CHLORIDE .6; .31; .03; .02 G/100ML; G/100ML; G/100ML; G/100ML
500 INJECTION, SOLUTION INTRAVENOUS PRN
Status: DISCONTINUED | OUTPATIENT
Start: 2023-02-20 | End: 2023-02-21

## 2023-02-20 RX ADMIN — SODIUM CHLORIDE, POTASSIUM CHLORIDE, SODIUM LACTATE AND CALCIUM CHLORIDE: 600; 310; 30; 20 INJECTION, SOLUTION INTRAVENOUS at 20:04

## 2023-02-20 RX ADMIN — Medication: at 18:45

## 2023-02-20 ASSESSMENT — ENCOUNTER SYMPTOMS: SHORTNESS OF BREATH: 0

## 2023-02-20 NOTE — H&P
Department of Obstetrics and Gynecology   Obstetrics History and Physical        CHIEF COMPLAINT:  Scheduled induction of labor    HISTORY OF PRESENT ILLNESS:      The patient is a 35 y.o. female  with IUP at 40w6d admitted for induction. Her prenatal care has been uncomplicated.   OB History          1    Para        Term                AB        Living             SAB        IAB        Ectopic        Molar        Multiple        Live Births                Patient presents with a chief complaint as above and is being admitted for induction    Estimated Due Date: Estimated Date of Delivery: 23    PRENATAL CARE:    Complicated by: history of HSV on swab but never had a breakout- on acyclovir    PAST OB HISTORY:  OB History          1    Para        Term                AB        Living             SAB        IAB        Ectopic        Molar        Multiple        Live Births                    Past Medical History:        Diagnosis Date    Anxiety     Headache     IBS (irritable bowel syndrome)     Insomnia      Past Surgical History:        Procedure Laterality Date    BREAST LUMPECTOMY Right     Benign     COLPOSCOPY  2013    Normal    CYST REMOVAL      Groin    WISDOM TOOTH EXTRACTION       Allergies:  Azithromycin and Ibuprofen    Social History:    Social History     Socioeconomic History    Marital status:      Spouse name: Not on file    Number of children: 0    Years of education: Not on file    Highest education level: Not on file   Occupational History    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    Tobacco Use    Smoking status: Never    Smokeless tobacco: Never   Vaping Use    Vaping Use: Never used   Substance and Sexual Activity    Alcohol use: Not Currently     Alcohol/week: 1.0 standard drink     Types: 1 Glasses of wine per week     Comment: occ    Drug use: Never    Sexual activity: Yes     Partners: Male     Birth control/protection: Pill   Other Topics Concern Not on file   Social History Narrative    Lives with Denys Ferguson- July 29, 2019     Engaged- January 30, 2020     Louie Jiménez date is May 30, 2021     Social Determinants of Health     Financial Resource Strain: Low Risk     Difficulty of Paying Living Expenses: Not hard at all   Food Insecurity: No Food Insecurity    Worried About Running Out of Food in the Last Year: Never true    Ran Out of Food in the Last Year: Never true   Transportation Needs: Not on file   Physical Activity: Sufficiently Active    Days of Exercise per Week: 4 days    Minutes of Exercise per Session: 40 min   Stress: Not on file   Social Connections: Not on file   Intimate Partner Violence: Not At Risk    Fear of Current or Ex-Partner: No    Emotionally Abused: No    Physically Abused: No    Sexually Abused: No   Housing Stability: Not on file     Family History:       Problem Relation Age of Onset    Hypertension Mother     No Known Problems Father     Diabetes Maternal Grandmother     Cancer Maternal Grandmother         Gallbladder     Stroke Maternal Grandmother     High Cholesterol Maternal Grandmother     No Known Problems Maternal Grandfather         Unknown     Hypertension Paternal Grandmother     Elevated Lipids Paternal Grandmother     Arrhythmia Paternal Grandmother     Heart Attack Paternal Grandfather     Prostate Cancer Paternal Uncle         In Remission     Cancer Paternal Uncle         Some type of blood cancer requiring a BMT , multiple myloma    Other Half-Sister         Short stature; Autism     Colon Cancer Neg Hx     Breast Cancer Neg Hx      Medications Prior to Admission:  Medications Prior to Admission: Prenatal MV-Min-Fe Fum-FA-DHA (PRENATAL 1 PO), Take by mouth  azelastine (ASTELIN) 0.1 % nasal spray, 137 sprays by Nasal route daily  fluticasone (FLONASE) 50 MCG/ACT nasal spray, 2 sprays by Nasal route    REVIEW OF SYSTEMS:    See above    PHYSICAL EXAM:  There were no vitals filed for this visit.   General appearance:  awake, alert, cooperative, no apparent distress, and appears stated age  Neurologic:  Awake, alert, oriented to name, place and time. Lungs:  No increased work of breathing, good air exchange  Abdomen:  Soft, non tender, gravid, consistent with her gestational age, EFW by Leopold's maneuver was 3600 gm   Fetal heart rate:  Reassuring.   Pelvis:  Adequate pelvis  Cervix: FT/30/-4  Contraction frequency:  irregular  Membranes:  Intact    Labs: CBC:   Lab Results   Component Value Date/Time    WBC 11.1 2023 08:00 PM    RBC 4.18 2023 08:00 PM    HGB 11.5 2023 08:00 PM    HCT 34.5 2023 08:00 PM    MCV 82.6 2023 08:00 PM    MCH 27.4 2023 08:00 PM    MCHC 33.2 2023 08:00 PM    RDW 14.6 2023 08:00 PM     2023 08:00 PM    MPV 9.1 2023 08:00 PM     U/A:    Lab Results   Component Value Date/Time    PHUR 6.0 2023 08:20 PM       ASSESSMENT AND PLAN:  36 y/o  with IUP at 40.6 weeks admitted for induction    Labor: Admit, anticipate normal delivery, routine labor orders  Fetus: Reassuring  GBS: No  Other: plan cytotec for cervical ripening and labor induction

## 2023-02-21 LAB — TOTAL SYPHILLIS IGG/IGM: NORMAL

## 2023-02-21 PROCEDURE — 6360000002 HC RX W HCPCS: Performed by: ANESTHESIOLOGY

## 2023-02-21 PROCEDURE — 2580000003 HC RX 258: Performed by: OBSTETRICS & GYNECOLOGY

## 2023-02-21 PROCEDURE — 6360000002 HC RX W HCPCS: Performed by: OBSTETRICS & GYNECOLOGY

## 2023-02-21 PROCEDURE — 2500000003 HC RX 250 WO HCPCS: Performed by: OBSTETRICS & GYNECOLOGY

## 2023-02-21 PROCEDURE — 7200000001 HC VAGINAL DELIVERY

## 2023-02-21 PROCEDURE — A4216 STERILE WATER/SALINE, 10 ML: HCPCS | Performed by: OBSTETRICS & GYNECOLOGY

## 2023-02-21 PROCEDURE — 3E0P7VZ INTRODUCTION OF HORMONE INTO FEMALE REPRODUCTIVE, VIA NATURAL OR ARTIFICIAL OPENING: ICD-10-PCS | Performed by: OBSTETRICS & GYNECOLOGY

## 2023-02-21 PROCEDURE — 0KQM0ZZ REPAIR PERINEUM MUSCLE, OPEN APPROACH: ICD-10-PCS | Performed by: OBSTETRICS & GYNECOLOGY

## 2023-02-21 PROCEDURE — 6370000000 HC RX 637 (ALT 250 FOR IP): Performed by: OBSTETRICS & GYNECOLOGY

## 2023-02-21 PROCEDURE — 86780 TREPONEMA PALLIDUM: CPT

## 2023-02-21 PROCEDURE — 3700000025 EPIDURAL BLOCK: Performed by: ANESTHESIOLOGY

## 2023-02-21 PROCEDURE — 10907ZC DRAINAGE OF AMNIOTIC FLUID, THERAPEUTIC FROM PRODUCTS OF CONCEPTION, VIA NATURAL OR ARTIFICIAL OPENING: ICD-10-PCS | Performed by: OBSTETRICS & GYNECOLOGY

## 2023-02-21 PROCEDURE — 2500000003 HC RX 250 WO HCPCS: Performed by: NURSE ANESTHETIST, CERTIFIED REGISTERED

## 2023-02-21 PROCEDURE — 1220000000 HC SEMI PRIVATE OB R&B

## 2023-02-21 RX ORDER — CALCIUM CARBONATE 200(500)MG
500 TABLET,CHEWABLE ORAL 3 TIMES DAILY PRN
Status: DISCONTINUED | OUTPATIENT
Start: 2023-02-21 | End: 2023-02-21

## 2023-02-21 RX ORDER — DOCUSATE SODIUM 100 MG/1
100 CAPSULE, LIQUID FILLED ORAL 2 TIMES DAILY
Status: DISCONTINUED | OUTPATIENT
Start: 2023-02-21 | End: 2023-02-23 | Stop reason: HOSPADM

## 2023-02-21 RX ORDER — MODIFIED LANOLIN
OINTMENT (GRAM) TOPICAL PRN
Status: DISCONTINUED | OUTPATIENT
Start: 2023-02-21 | End: 2023-02-23 | Stop reason: HOSPADM

## 2023-02-21 RX ORDER — ONDANSETRON 2 MG/ML
4 INJECTION INTRAMUSCULAR; INTRAVENOUS EVERY 6 HOURS PRN
Status: DISCONTINUED | OUTPATIENT
Start: 2023-02-21 | End: 2023-02-23 | Stop reason: HOSPADM

## 2023-02-21 RX ORDER — FENTANYL/BUPIVACAINE/NS/PF 2-1250MCG
PLASTIC BAG, INJECTION (ML) INJECTION
Status: COMPLETED
Start: 2023-02-21 | End: 2023-02-21

## 2023-02-21 RX ORDER — ACETAMINOPHEN 500 MG
500 TABLET ORAL EVERY 6 HOURS PRN
Qty: 30 TABLET | Refills: 0 | Status: SHIPPED | OUTPATIENT
Start: 2023-02-21

## 2023-02-21 RX ORDER — LIDOCAINE HYDROCHLORIDE 10 MG/ML
30 INJECTION, SOLUTION EPIDURAL; INFILTRATION; INTRACAUDAL; PERINEURAL ONCE
Status: DISCONTINUED | OUTPATIENT
Start: 2023-02-21 | End: 2023-02-21

## 2023-02-21 RX ORDER — SODIUM CHLORIDE 0.9 % (FLUSH) 0.9 %
5-40 SYRINGE (ML) INJECTION EVERY 12 HOURS SCHEDULED
Status: DISCONTINUED | OUTPATIENT
Start: 2023-02-21 | End: 2023-02-23 | Stop reason: HOSPADM

## 2023-02-21 RX ORDER — OXYCODONE HYDROCHLORIDE 5 MG/1
5 TABLET ORAL EVERY 4 HOURS PRN
Qty: 12 TABLET | Refills: 0 | Status: SHIPPED | OUTPATIENT
Start: 2023-02-21 | End: 2023-02-24

## 2023-02-21 RX ORDER — MORPHINE SULFATE 1 MG/ML
2 INJECTION, SOLUTION EPIDURAL; INTRATHECAL; INTRAVENOUS
Status: DISCONTINUED | OUTPATIENT
Start: 2023-02-21 | End: 2023-02-23 | Stop reason: HOSPADM

## 2023-02-21 RX ORDER — ACETAMINOPHEN 500 MG
1000 TABLET ORAL EVERY 8 HOURS SCHEDULED
Status: DISCONTINUED | OUTPATIENT
Start: 2023-02-21 | End: 2023-02-23 | Stop reason: HOSPADM

## 2023-02-21 RX ORDER — SODIUM CHLORIDE 0.9 % (FLUSH) 0.9 %
5-40 SYRINGE (ML) INJECTION PRN
Status: DISCONTINUED | OUTPATIENT
Start: 2023-02-21 | End: 2023-02-23 | Stop reason: HOSPADM

## 2023-02-21 RX ORDER — OXYCODONE HYDROCHLORIDE 5 MG/1
5 TABLET ORAL EVERY 4 HOURS PRN
Status: DISCONTINUED | OUTPATIENT
Start: 2023-02-21 | End: 2023-02-23 | Stop reason: HOSPADM

## 2023-02-21 RX ORDER — FENTANYL/BUPIVACAINE/NS/PF 2-1250MCG
12 PLASTIC BAG, INJECTION (ML) INJECTION CONTINUOUS
Status: DISCONTINUED | OUTPATIENT
Start: 2023-02-21 | End: 2023-02-21

## 2023-02-21 RX ORDER — FAMOTIDINE 20 MG/1
20 TABLET, FILM COATED ORAL 2 TIMES DAILY
Status: DISCONTINUED | OUTPATIENT
Start: 2023-02-21 | End: 2023-02-23 | Stop reason: HOSPADM

## 2023-02-21 RX ORDER — SODIUM CHLORIDE 9 MG/ML
INJECTION, SOLUTION INTRAVENOUS PRN
Status: DISCONTINUED | OUTPATIENT
Start: 2023-02-21 | End: 2023-02-23 | Stop reason: HOSPADM

## 2023-02-21 RX ORDER — NALOXONE HYDROCHLORIDE 0.4 MG/ML
INJECTION, SOLUTION INTRAMUSCULAR; INTRAVENOUS; SUBCUTANEOUS PRN
Status: DISCONTINUED | OUTPATIENT
Start: 2023-02-21 | End: 2023-02-21

## 2023-02-21 RX ORDER — LIDOCAINE HYDROCHLORIDE AND EPINEPHRINE 15; 5 MG/ML; UG/ML
INJECTION, SOLUTION EPIDURAL PRN
Status: DISCONTINUED | OUTPATIENT
Start: 2023-02-21 | End: 2023-02-21 | Stop reason: SDUPTHER

## 2023-02-21 RX ORDER — SODIUM CHLORIDE, SODIUM LACTATE, POTASSIUM CHLORIDE, CALCIUM CHLORIDE 600; 310; 30; 20 MG/100ML; MG/100ML; MG/100ML; MG/100ML
INJECTION, SOLUTION INTRAVENOUS CONTINUOUS
Status: DISCONTINUED | OUTPATIENT
Start: 2023-02-21 | End: 2023-02-23 | Stop reason: HOSPADM

## 2023-02-21 RX ORDER — FERROUS SULFATE 325(65) MG
325 TABLET ORAL 2 TIMES DAILY WITH MEALS
Status: DISCONTINUED | OUTPATIENT
Start: 2023-02-21 | End: 2023-02-23 | Stop reason: HOSPADM

## 2023-02-21 RX ADMIN — SODIUM CHLORIDE, POTASSIUM CHLORIDE, SODIUM LACTATE AND CALCIUM CHLORIDE: 600; 310; 30; 20 INJECTION, SOLUTION INTRAVENOUS at 11:31

## 2023-02-21 RX ADMIN — ONDANSETRON 4 MG: 2 INJECTION INTRAMUSCULAR; INTRAVENOUS at 06:30

## 2023-02-21 RX ADMIN — AMPICILLIN SODIUM 1000 MG: 1 INJECTION, POWDER, FOR SOLUTION INTRAMUSCULAR; INTRAVENOUS at 20:18

## 2023-02-21 RX ADMIN — LIDOCAINE HYDROCHLORIDE AND EPINEPHRINE 3 ML: 15; 5 INJECTION, SOLUTION EPIDURAL at 01:22

## 2023-02-21 RX ADMIN — LIDOCAINE HYDROCHLORIDE AND EPINEPHRINE 2 ML: 15; 5 INJECTION, SOLUTION EPIDURAL at 01:26

## 2023-02-21 RX ADMIN — ANTACID TABLETS 500 MG: 500 TABLET, CHEWABLE ORAL at 05:05

## 2023-02-21 RX ADMIN — ACETAMINOPHEN 1000 MG: 500 TABLET ORAL at 15:57

## 2023-02-21 RX ADMIN — Medication 12 ML/HR: at 01:27

## 2023-02-21 RX ADMIN — Medication 1 MILLI-UNITS/MIN: at 09:37

## 2023-02-21 RX ADMIN — SODIUM CHLORIDE, POTASSIUM CHLORIDE, SODIUM LACTATE AND CALCIUM CHLORIDE: 600; 310; 30; 20 INJECTION, SOLUTION INTRAVENOUS at 01:14

## 2023-02-21 RX ADMIN — BUTORPHANOL TARTRATE 1 MG: 1 INJECTION, SOLUTION INTRAMUSCULAR; INTRAVENOUS at 00:21

## 2023-02-21 RX ADMIN — SODIUM CHLORIDE, POTASSIUM CHLORIDE, SODIUM LACTATE AND CALCIUM CHLORIDE: 600; 310; 30; 20 INJECTION, SOLUTION INTRAVENOUS at 02:02

## 2023-02-21 RX ADMIN — AMPICILLIN SODIUM 1000 MG: 1 INJECTION, POWDER, FOR SOLUTION INTRAMUSCULAR; INTRAVENOUS at 15:48

## 2023-02-21 RX ADMIN — FAMOTIDINE 20 MG: 10 INJECTION, SOLUTION INTRAVENOUS at 06:02

## 2023-02-21 RX ADMIN — Medication 87.3 MILLI-UNITS/MIN: at 13:45

## 2023-02-21 ASSESSMENT — PAIN - FUNCTIONAL ASSESSMENT: PAIN_FUNCTIONAL_ASSESSMENT: ACTIVITIES ARE NOT PREVENTED

## 2023-02-21 ASSESSMENT — PAIN DESCRIPTION - DESCRIPTORS: DESCRIPTORS: SHARP

## 2023-02-21 ASSESSMENT — PAIN SCALES - GENERAL: PAINLEVEL_OUTOF10: 3

## 2023-02-21 ASSESSMENT — PAIN DESCRIPTION - LOCATION
LOCATION: ABDOMEN;BACK
LOCATION: OTHER (COMMENT)

## 2023-02-21 NOTE — FLOWSHEET NOTE
Report received at [facility] from FABI Gamboa RN. Report received Dr. Mercedes Chavarria placed first cytotec at [address]. SVE FT/thick/posterior.

## 2023-02-21 NOTE — FLOWSHEET NOTE
Assisted patient x2 to bathroom to void 600cc urine without difficulty. Clotilde-care given and postpartum bed brought into room. Pt instructed on perineal care and self administration of perineal meds. Pt verbalized understanding and may self medicate.

## 2023-02-21 NOTE — ANESTHESIA PROCEDURE NOTES
Epidural Block    Patient location during procedure: OB  Start time: 2023 1:16 AM  End time: 2023 1:22 AM  Reason for block: labor epidural and at surgeon's request  Staffing  Performed: resident/CRNA   Resident/CRNA: ALEX Talavera - CRNA  Epidural  Patient position: sitting  Prep: ChloraPrep and site prepped and draped  Patient monitoring: cardiac monitor, continuous pulse ox and frequent blood pressure checks  Approach: midline  Location: L3-4  Injection technique: VISHAL saline  Provider prep: mask and sterile gloves  Needle  Needle type: Tuohy   Needle gauge: 17 G  Needle length: 3.5 in  Needle insertion depth: 6 cm  Catheter type: side hole  Catheter size: 19 G (20 G)  Catheter at skin depth: 11 cm  Test dose: negativeCatheter Secured: tape and tegaderm  Assessment  Sensory level: T10  Hemodynamics: stable  Attempts: 1  Outcomes: uncomplicated and patient tolerated procedure well  Additional Notes  Called for labor epidural analgesia request. Medical and Surgical history reviewed with pt. Risks/benefits of epidural discussed including allergic reaction, infection, bleeding, hypotension, headache, back pain, nerve damage, failed or one-sided block. Also discussed anesthesia options and associated risks in the event of . Questions answered. Verbalizes understanding and requests to proceed. Pt in sitting position. Labor epidural placed using VISHAL sterile technique (donned mask and sterile gloves). Back prepped with [email or web address]. Sterile drape applied. Site: L2-3  VISHAL:   6 cm. Attempts:  1   Re-directs:  0  Site infiltrated with 1%Lidocaine. 17G Tuohy needle inserted, VISHAL technique with saline. No heme, CSF, or paresthesias noted. Epidural space dilated with saline. -CSF -heme or paresthesia. Spinal needle removed. Threaded epidural catheter through Tuohy needle easily. Tuohy needle withdrawn. Test Dose:           Negative aspiration.  3cc of 1.5% Lido with epi 1:200,000 test dose given. Negative test dose.     Skin:  Xcm catheter taped at the skin. Secured with steri strips, tegaderm, and tape.     Infusion:  .125% Bupivacaine with Fentanyl (2ug/cc)  Auto bolus 4 ml every 20 minutes. (Max. Dose- 40 ml/hr.)      Sensory Level:  R:  T10 L:  T10    VAS: start 6/10, end 1/10    Patient in supine position with left uterine displacement. VSS:       Preanesthetic Checklist  Completed: patient identified, IV checked, site marked, risks and benefits discussed, surgical/procedural consents, equipment checked, pre-op evaluation, timeout performed, anesthesia consent given, oxygen available, monitors applied/VS acknowledged, fire risk safety assessment completed and verbalized and blood product R/B/A discussed and consented

## 2023-02-21 NOTE — LACTATION NOTE
This note was copied from a baby's chart. Lactation Progress Note      Data:   Called to L&D by Barbara Santillan RN per request of mother. Parents took all [employer] classes including breastfeeding. Mother holding rooting NB. Mother asking [facility] for assistance. Action: NB placed onto warmer. [facility] assisted mother with pillows. Mother shown placement of NB for football hold on mother's left breast. At first NB with off on on latch and occasional tongue thrust which would cause NB to loose latch. Toward end of feeding NB much improved. LC was able to transfer NB to mother's hands. FOB called to bedside to watch NB. Mother is sleepy. RN and female visitor also at bedside. LC provided and discussed the following:  Normal NB less than 24 hrs old  Hunger cues  [employer]  [facility] breastfeeding booklet  [employer] handout  [employer] flyer  [facility] card    Response: Family denies further needs.

## 2023-02-21 NOTE — PROGRESS NOTES
S: Contraction pain is mild. Epidural Yes    VS:   Vitals:    23 0645 23 0715 23 0745 23 0815   BP: (!) 107/59 103/66 108/67 104/70   Pulse: (!) 103 89 79 84   Resp: 16      Temp:       TempSrc:       SpO2:       Weight:             FHT: Baseline: 140   Variability: moderate   Accelerations: Present   Decels: Absent    Cervical Exam:   Dilation (cm): 10   Effacement: 100       Station: -1 (-1/0)      Pitocin- 1 me/min     A/P: 35 y.o.  41w0d admitted for induction s/p cytotec x 1 now on pitocin. On epidural  Labor: progressing well     Fetus: reassuring  GBS: neg  Other: continue Pitocin;  Anticipate

## 2023-02-21 NOTE — ANESTHESIA PRE PROCEDURE
Department of Anesthesiology  Preprocedure Note       Name:  Josie Viveros   Age:  35 y.o.  :  1990                                          MRN:  [id number]         Date:  2023      Surgeon: * No surgeons listed *    Procedure: * No procedures listed *    Medications prior to admission:   Prior to Admission medications    Medication Sig Start Date End Date Taking?  Authorizing Provider   Prenatal MV-Min-Fe Fum-FA-DHA (PRENATAL 1 PO) Take by mouth    Historical Provider, MD   azelastine (ASTELIN) 0.1 % nasal spray 137 sprays by Nasal route daily    Historical Provider, MD   fluticasone (FLONASE) 50 MCG/ACT nasal spray 2 sprays by Nasal route 10/2/15   Historical Provider, MD       Current medications:    Current Facility-Administered Medications   Medication Dose Route Frequency Provider Last Rate Last Admin    lactated ringers IV soln infusion   IntraVENous Continuous Dung Rivera MD        lactated ringers bolus  500 mL IntraVENous PRN Dung Rivera MD        Or    lactated ringers bolus  1,000 mL IntraVENous PRN Dung Rivera MD        sodium chloride flush 0.9 % injection 5-40 mL  5-40 mL IntraVENous 2 times per day Dung Rivera MD        sodium chloride flush 0.9 % injection 5-40 mL  5-40 mL IntraVENous PRN Dung Rivera MD        0.9 % sodium chloride infusion  25 mL IntraVENous PRN Dung Rivera MD        methylergonovine (METHERGINE) injection 200 mcg  200 mcg IntraMUSCular PRN Dung Rivera MD        carboprost (HEMABATE) injection 250 mcg  250 mcg IntraMUSCular PRN Dung Rivera MD        miSOPROStol (CYTOTEC) tablet 800 mcg  800 mcg Rectal PRN Dung Rivera MD        tranexamic acid (CYKLOKAPRON) 1,000 mg in sodium chloride 0.9 % 60 mL IVPB  1,000 mg IntraVENous Once PRN Dung Rivera MD        oxytocin (PITOCIN) 30 units in 500 mL infusion  87.3 sha-units/min IntraVENous Continuous PRN Dung Rivera MD        And    oxytocin (PITOCIN) 10 unit bolus from the bag  10 Units IntraVENous PRN Jennifer Mcgrath MD        ondansetron [facility]) injection 4 mg  4 mg IntraVENous Q6H PRN Jennifer Mcgrath MD        docusate sodium (COLACE) capsule 100 mg  100 mg Oral BID Jennifer Mcgrath MD        nalbuphine (NUBAIN) injection 10 mg  10 mg IntraVENous Q2H PRN Jennifer Mcgrath MD        butorphanol (STADOL) injection 1 mg  1 mg IntraVENous Q3H PRN Jennifer Mcgrath MD        nalbuphine (NUBAIN) injection 5 mg  5 mg IntraMUSCular Once PRN Jennifer Mcgrath MD        And    nalbuphine (NUBAIN) injection 5 mg  5 mg IntraVENous Once PRN Jennifer Mcgrath MD        hydrocortisone 2.5 % cream   Topical Q2H PRN Jennifer Mcgrath MD        oxytocin (PITOCIN) 30 units in 500 mL infusion  1-20 sha-units/min IntraVENous Continuous Jennifer Mcgrath MD        famotidine (PEPCID) 20 mg in sodium chloride (PF) 0.9 % 10 mL injection  20 mg IntraVENous BID Jennifer Mcgrath MD           Allergies:     Allergies   Allergen Reactions    Azithromycin Shortness Of Breath    Ibuprofen Nausea And Vomiting       Problem List:    Patient Active Problem List   Diagnosis Code    Mild dysplasia of cervix N87.0    Anxiety F41.9    Term pregnancy Z34.90       Past Medical History:        Diagnosis Date    Alpha 0-thalassemia ([facility] [address]     FOB negative    Anxiety     Headache     Herpes simplex virus (HSV) infection     1 and 2- nevery had an outbreak    IBS (irritable bowel syndrome)     Insomnia        Past Surgical History:        Procedure Laterality Date    BREAST LUMPECTOMY Right     Benign     COLPOSCOPY  2013    Normal    CYST REMOVAL      Groin    WISDOM TOOTH EXTRACTION         Social History:    Social History     Tobacco Use    Smoking status: Never    Smokeless tobacco: Never   Substance Use Topics    Alcohol use: Not Currently     Alcohol/week: 1.0 standard drink     Types: 1 Glasses of wine per week     Comment: occ                                Counseling given: Not Answered      Vital Signs (Current):   Vitals:    02/20/23 1842   BP: 118/74 Pulse: (!) 111   Resp: 16   Temp: 37.1 °C (98.8 °F)   TempSrc: Axillary   SpO2: 97%                                              BP Readings from Last 3 Encounters:   02/20/23 118/74   12/30/22 103/66   10/31/22 132/64       NPO Status:                                                                                 BMI:   Wt Readings from Last 3 Encounters:   12/30/22 253 lb (114.8 kg)   10/31/22 244 lb 6 oz (110.8 kg)   04/29/22 210 lb 3.2 oz (95.3 kg)     There is no height or weight on file to calculate BMI.    CBC:   Lab Results   Component Value Date/Time    WBC 11.3 10/21/2022 10:43 AM    RBC 4.28 10/21/2022 10:43 AM    HGB 12.1 10/21/2022 10:43 AM    HCT 36.0 10/21/2022 10:43 AM    MCV 84.1 10/21/2022 10:43 AM    RDW 13.0 10/21/2022 10:43 AM     10/21/2022 10:43 AM       CMP:   Lab Results   Component Value Date/Time     12/28/2020 11:50 AM    K 5.4 12/28/2020 11:50 AM     12/28/2020 11:50 AM    CO2 20 12/28/2020 11:50 AM    BUN 12 12/28/2020 11:50 AM    CREATININE 0.7 12/28/2020 11:50 AM    GFRAA >60 12/28/2020 11:50 AM    AGRATIO 1.0 12/28/2020 11:50 AM    LABGLOM >60 12/28/2020 11:50 AM    GLUCOSE 86 12/28/2020 11:50 AM    PROT 7.8 12/28/2020 11:50 AM    CALCIUM 9.1 12/28/2020 11:50 AM    BILITOT <0.2 12/28/2020 11:50 AM    ALKPHOS 34 12/28/2020 11:50 AM    AST 39 12/28/2020 11:50 AM    ALT 14 12/28/2020 11:50 AM       POC Tests: No results for input(s): POCGLU, POCNA, POCK, POCCL, POCBUN, POCHEMO, POCHCT in the last 72 hours.     Coags: No results found for: PROTIME, INR, APTT    HCG (If Applicable):   Lab Results   Component Value Date    PREGTESTUR negative 01/30/2020        ABGs: No results found for: PHART, PO2ART, FSU9EMC, YGH0GCN, BEART, K6OQLFXG     Type & Screen (If Applicable):  No results found for: LABABO, LABRH    Drug/Infectious Status (If Applicable):  No results found for: HIV, HEPCAB    COVID-19 Screening (If Applicable):   Lab Results   Component Value Date/Time COVID19 Negative 01/29/2021 04:18 PM           Anesthesia Evaluation  Patient summary reviewed and Nursing notes reviewed no history of anesthetic complications:   Airway: Mallampati: II  TM distance: >3 FB   Neck ROM: full  Mouth opening: > = 3 FB   Dental: normal exam         Pulmonary:normal exam        (-) pneumonia, COPD, asthma, shortness of breath, recent URI and sleep apnea                           Cardiovascular:  Exercise tolerance: good (>4 METS),       (-) pacemaker, hypertension, valvular problems/murmurs, past MI, CAD, CABG/stent, dysrhythmias,  angina,  CHF, orthopnea, PND,  ROMERO, no pulmonary hypertension and no hyperlipidemia                Neuro/Psych:      (-) seizures, neuromuscular disease, TIA, CVA, headaches and psychiatric history           GI/Hepatic/Renal:        (-) hiatal hernia, GERD, PUD, hepatitis, liver disease, no renal disease and bowel prep       Endo/Other:        (-) hypothyroidism, hyperthyroidism, blood dyscrasia, arthritis               Abdominal:             Vascular: Other Findings:           Anesthesia Plan      epidural     ASA 2             Anesthetic plan and risks discussed with patient. Use of blood products discussed with patient whom consented to blood products. Plan discussed with attending and CRNA.     Attending anesthesiologist reviewed and agrees with Preprocedure content                ALEX Robert - CRNA   2/20/2023

## 2023-02-21 NOTE — PROGRESS NOTES
S: Patient doing well. Contraction pain is mild. Epidural Yes    VS:   Vitals:    23 0416 23 0446 23 0515 23 0517   BP: 100/65 132/70 (!) 101/59    Pulse: 81 (!) 101 100    Resp: 18 18 17    Temp: 97.6 °F (36.4 °C)      TempSrc: Oral      SpO2:    94%   Weight:             FHT: Baseline: 130   Variability: moderate   Accelerations: Present   Decels: Absent    Cervical Exam:   Dilation (cm): 10   Effacement: 100       Station: -1 (-1/0)        AROM- clear fluid    A/P: 35 y.o.  41w0d admitted for postdates induction. S/p 1 dose of cytotec  Labor: progressing well     Fetus: reassuring  GBS: no  Other: Continue routine care;  Anticipate

## 2023-02-21 NOTE — PROCEDURES
Department of Obstetrics and Gynecology  Spontaneous Vaginal Delivery Note    Labor & Delivery Summary  Labor Onset Date: 23  Labor Onset Time: 11  Dilation Complete Date: 23  Dilation Complete Time: 045    Pre-operative Diagnosis:  Term pregnancy, Induced labor, Single fetus, and Uncomplicated pregnancy    Post-operative Diagnosis:  Living  infant(s) and Male    Procedure:  Spontaneous vaginal delivery    Surgeon:  Babita Hills     Labor & Delivery Summary  Labor Onset Date: 23  Labor Onset Time: 11  Dilation Complete Date: 23  Dilation Complete Time: 0450  Information for the patient's :  Gildardo Ortega [[id number]]   APGAR One: 8   Information for the patient's :  Myra John [[id number]]   APGAR Five: 9   Information for the patient's :  Myra John [[id number]]   Birth Weight: 8 lb 2.7 oz (3.705 kg)     Anesthesia:  epidural anesthesia    Estimated blood loss:  100 ml    Specimen:  Placenta not sent to pathology     Cord blood sent No    Complications:  none    Condition:  infant stable to general nursery and mother stable    Details of Procedure: The patient is a 35 y.o. female at 37w0d   OB History          1    Para   1    Term   1            AB        Living   1         SAB        IAB        Ectopic        Molar        Multiple   0    Live Births   1             who was admitted for induction. She received the following interventions: vaginal Cytotec and IV Pitocin augmentation. The patient progressed normally,did receive an epidural, became complete and started to push. After pushing for 1 hour the infant was delivered  atraumatically and  placed on the maternal abdomen. The cord was clamped and cut after a delay and infant was handed off to the waiting nurse for evaluation. The delivery of the placenta was spontaneous. The uterus was manually explored and clots and debris were removed.   Routine cord blood and cord gases were sent. The perineum and vagina were explored and a second degree perineal laceration was repaired in standard fashion using 2-0 vicryl.

## 2023-02-21 NOTE — FLOWSHEET NOTE
Dr. Amelia Beckett at bedside for delivery. Perineal cleansing completed. Pt's legs placed in stirrups.

## 2023-02-21 NOTE — DISCHARGE SUMMARY
Obstetrical Discharge Form    Gestational Age: 37w0d    Antepartum complications: none    Date of Delivery: 23      Type of Delivery: vaginal, spontaneous    Delivered By: Felicity Jaquez     Baby:      Information for the patient's :  Ezekiel Bradford [[id number]]   APGAR One: 6   Information for the patient's :  Maya Perches [[id number]]   APGAR Five: 9   Information for the patient's :  Maya Perches [[id number]]   Birth Weight: 8 lb 2.7 oz (3.705 kg)     Anesthesia: Epidural    Intrapartum complications: Fever  s/p antibiotics postpartum    Postpartum complications: none    Discharge Medication:      Medication List        ASK your doctor about these medications      acyclovir 200 MG capsule  Commonly known as: ZOVIRAX     azelastine 0.1 % nasal spray  Commonly known as: ASTELIN     fluticasone 50 MCG/ACT nasal spray  Commonly known as: FLONASE     PRENATAL 1 PO              Discharge Condition:  good    Discharge Date: 23    PLAN:  Follow up in 6 weeks for routine PP visit  All questions answered  D/C summary begun at delivery for D/C planning purposes, any delay in discharge from ordered D/C date due to  factors.

## 2023-02-21 NOTE — FLOWSHEET NOTE
Patient actively pushing. RN and Dr. Mk Hernandez remain in continuous attendance at the bedside. Assessment & evaluation of fetal heart rate ongoing via continuous EFM.

## 2023-02-22 LAB
HCT VFR BLD CALC: 35 % (ref 36–48)
HEMOGLOBIN: 11.4 G/DL (ref 12–16)
MCH RBC QN AUTO: 27 PG (ref 26–34)
MCHC RBC AUTO-ENTMCNC: 32.5 G/DL (ref 31–36)
MCV RBC AUTO: 83.3 FL (ref 80–100)
PDW BLD-RTO: 14.4 % (ref 12.4–15.4)
PLATELET # BLD: 140 K/UL (ref 135–450)
PMV BLD AUTO: 9.2 FL (ref 5–10.5)
RBC # BLD: 4.2 M/UL (ref 4–5.2)
WBC # BLD: 18.1 K/UL (ref 4–11)

## 2023-02-22 PROCEDURE — 2580000003 HC RX 258: Performed by: OBSTETRICS & GYNECOLOGY

## 2023-02-22 PROCEDURE — 85027 COMPLETE CBC AUTOMATED: CPT

## 2023-02-22 PROCEDURE — 1220000000 HC SEMI PRIVATE OB R&B

## 2023-02-22 PROCEDURE — 6370000000 HC RX 637 (ALT 250 FOR IP): Performed by: OBSTETRICS & GYNECOLOGY

## 2023-02-22 RX ORDER — FUROSEMIDE 20 MG/1
20 TABLET ORAL DAILY
Status: DISCONTINUED | OUTPATIENT
Start: 2023-02-22 | End: 2023-02-23 | Stop reason: HOSPADM

## 2023-02-22 RX ORDER — CETIRIZINE HYDROCHLORIDE 10 MG/1
10 TABLET ORAL DAILY
Status: DISCONTINUED | OUTPATIENT
Start: 2023-02-22 | End: 2023-02-23 | Stop reason: HOSPADM

## 2023-02-22 RX ADMIN — DOCUSATE SODIUM 100 MG: 100 CAPSULE, LIQUID FILLED ORAL at 08:55

## 2023-02-22 RX ADMIN — FAMOTIDINE 20 MG: 20 TABLET, FILM COATED ORAL at 08:55

## 2023-02-22 RX ADMIN — OXYCODONE HYDROCHLORIDE 5 MG: 5 TABLET ORAL at 15:54

## 2023-02-22 RX ADMIN — OXYCODONE HYDROCHLORIDE 5 MG: 5 TABLET ORAL at 21:42

## 2023-02-22 RX ADMIN — CETIRIZINE HYDROCHLORIDE 10 MG: 10 TABLET, FILM COATED ORAL at 16:41

## 2023-02-22 RX ADMIN — FAMOTIDINE 20 MG: 20 TABLET, FILM COATED ORAL at 21:14

## 2023-02-22 RX ADMIN — ACETAMINOPHEN 1000 MG: 500 TABLET ORAL at 08:55

## 2023-02-22 RX ADMIN — ACETAMINOPHEN 1000 MG: 500 TABLET ORAL at 16:40

## 2023-02-22 RX ADMIN — SODIUM CHLORIDE, PRESERVATIVE FREE 10 ML: 5 INJECTION INTRAVENOUS at 08:56

## 2023-02-22 RX ADMIN — ACETAMINOPHEN 1000 MG: 500 TABLET ORAL at 00:47

## 2023-02-22 RX ADMIN — Medication 1 SPRAY: at 16:41

## 2023-02-22 RX ADMIN — DOCUSATE SODIUM 100 MG: 100 CAPSULE, LIQUID FILLED ORAL at 21:14

## 2023-02-22 RX ADMIN — FUROSEMIDE 20 MG: 20 TABLET ORAL at 11:51

## 2023-02-22 RX ADMIN — DOCUSATE SODIUM 100 MG: 100 CAPSULE, LIQUID FILLED ORAL at 00:47

## 2023-02-22 ASSESSMENT — PAIN SCALES - GENERAL
PAINLEVEL_OUTOF10: 4
PAINLEVEL_OUTOF10: 3
PAINLEVEL_OUTOF10: 4
PAINLEVEL_OUTOF10: 5

## 2023-02-22 ASSESSMENT — PAIN DESCRIPTION - LOCATION
LOCATION: ABDOMEN
LOCATION: ABDOMEN;BACK
LOCATION: ABDOMEN;PERINEUM
LOCATION: ABDOMEN;PERINEUM

## 2023-02-22 ASSESSMENT — PAIN DESCRIPTION - DESCRIPTORS
DESCRIPTORS: ACHING
DESCRIPTORS: SORE
DESCRIPTORS: ACHING
DESCRIPTORS: CRAMPING

## 2023-02-22 ASSESSMENT — PAIN DESCRIPTION - ORIENTATION: ORIENTATION: LOWER

## 2023-02-22 ASSESSMENT — PAIN - FUNCTIONAL ASSESSMENT: PAIN_FUNCTIONAL_ASSESSMENT: ACTIVITIES ARE NOT PREVENTED

## 2023-02-22 NOTE — LACTATION NOTE
This note was copied from a baby's chart. Lactation Progress Note      Data:   Called to room per RN. NB screaming when [facility] arrived. Mother requesting assistance. Action: NB placed into crib. NB is tall so LC added another pillow behind mother's back and placed it vertically. LC placed a pillow at mother's side to bring NB level to the breast. Mother's nipples are everted, but dense and slightly retract. LC used corner hold to latch NB. Couple attempts then TATIANA, SRS, AS. NB fed well and pushed off content. NB placed skin-to-skin with mother and NB remained content. LC encouraged mother to continue exclusive breastfeed. Mother encouraged to attempt on her own 10-15 minutes and then if unable to call RN for assistance. Mother informed we want to see her be successful. LC reviewed benefits of breastfeeding. FOB at side as well as female visitor. Response: Mother voiced being pleased and denies further needs.

## 2023-02-22 NOTE — LACTATION NOTE
This note was copied from a baby's chart. Lactation Progress Note      Data:   Follow-up. Action: LC offered to answer any questions. Mother informed of [address] availability. [address] card and [employer] flyer provided. Mother voiced concerns that baby falls asleep while feeding then once in crib shows feeding cues again. LC dicussed keeping NB awake during the feeding. LC also encouraged skin-to-skin during feeding and discussed the benefits of skin-to-skin. Mother encouraged to come to [employer] each week. Response: Mother denies further needs or questions at this time.

## 2023-02-22 NOTE — PROGRESS NOTES
[facility] post-partum note    Post-partum Day #1    Subjective:  Pain is : Mild  Lochia: thin lochia  Nausea: None  Ambulating, tolerate regular diet  Objective:  /75   Pulse 91   Temp 98.1 °F (36.7 °C) (Oral)   Resp 16   Wt 262 lb (118.8 kg)   SpO2 97%   Breastfeeding Unknown   BMI 41.04 kg/m²   Abdominal exam: soft, nontender, nondistended,fundus below umbilicus, firm.     Lab Results   Component Value Date    WBC 18.1 (H) 02/22/2023    HGB 11.4 (L) 02/22/2023    HCT 35.0 (L) 02/22/2023    MCV 83.3 02/22/2023     02/22/2023        Assessment/Plan:      Continue Postpartum care, encourage ambulation  Discharge today: no  Follow up: 6 weeks

## 2023-02-22 NOTE — FLOWSHEET NOTE
Pt call to nurse regarding WBC result of 18. Discussed elevated temp after delivery but has since resolved. Nurse call to MD regarding pt's concerns. New order received for CBC tomorrow at 0600. MD to speak with pt.

## 2023-02-22 NOTE — ANESTHESIA POSTPROCEDURE EVALUATION
Department of Anesthesiology  Postprocedure Note    Patient: Niyah Warner  MRN: [id number]  YOB: 1990  Date of evaluation: 2/22/2023      Procedure Summary     Date: 02/21/23 Room / Location:     Anesthesia Start: 0113 Anesthesia Stop: 2400    Procedure: Labor Analgesia Diagnosis:     Scheduled Providers:  Responsible Provider: Neelam Do MD    Anesthesia Type: epidural ASA Status: 2          Anesthesia Type: No value filed. Molly Phase I:      Molly Phase II:        Anesthesia Post Evaluation    Patient location during evaluation: bedside  Patient participation: complete - patient participated  Level of consciousness: awake and alert  Pain score: 2  Airway patency: patent  Nausea & Vomiting: no vomiting and no nausea  Complications: no  Cardiovascular status: hemodynamically stable  Respiratory status: acceptable, nonlabored ventilation, room air and spontaneous ventilation  Hydration status: stable  Multimodal analgesia pain management approach    Patient s/p epidural for L&D. Pt denies residual numbness post block. Patient is ambulating and voiding without difficulty. Patient denies back pain, headache, paresthesias, n/v or pruritus. Epidural site is free of signs of infection.

## 2023-02-22 NOTE — FLOWSHEET NOTE
Pt states she feels better after receiving pain medicine, ocean spray, and Zyrtec and was able to sleep for a little while.

## 2023-02-22 NOTE — DISCHARGE INSTRUCTIONS
Thank you for the opportunity to care for you and your family. We hope that you are happy with the care we provided during your stay in the [facility]. We want to ensure that you have the help you need when you leave the hospital. If there is anything we can assist you with, please let us know. Breastfeeding mothers may contact our lactation specialists with any problems or questions. The [facility] lactation services phone number is [phone]. Leave a message and your call will be returned. Please refer to the information provided in the postpartum care booklet. The following are warning signs to remember. Call 911 if you have:    Chest pain or pressure  Shortness of breath, even at rest  Thoughts of harming yourself or others  Seizures    Call your healthcare provider if you have:    Temperature of 100.4 degrees or higher  Stitches that are not healing        -- Swelling, bleeding, drainage, foul odor, redness or warmth in/around your           stitches, staples, or incision (scar)        -- Bad smelling blood or discharge from the vagina  Vaginal bleeding that has increased         -- Soaking through one pad in an hour        -- You are passing clots larger than the size of a lemon  Red, warm tender area(s) in your breast or calf  Headache that does not get better, even after taking medicine; or headache with vision changes    Remember to notify all healthcare providers from your date of delivery to up to one year after giving birth! CARING FOR YOURSELF        DIET/ACTIVITY    Eat a well balanced diet focusing on foods high in fiber and protein. Drink plenty of fluids, especially water. To avoid constipation you may take a mild stool softener as recommended by your doctor or midwife. Gradually increase your activity. Resume an exercise regime only after being advised by your doctor or midwife. When sitting or lying down, keep your legs elevated to reduce swelling.   Avoid lifting anything heavier than your baby or a gallon of milk. Avoid driving for two weeks or while taking narcotics. No sexual intercourse for 6 weeks, or until advised by your OB provider. Nothing in the vagina: intercourse, tampons or douching. Be prepared to discuss family planning at your follow up OB visit. If your feel tired and have a lack of energy, you may continue to take your prenatal vitamins. Nap when your baby naps to catch on sleep. EMOTIONS    You may feel heaton, sad, teary and overwhelmed. Contact your OB provider if you think you may be showing signs of postpartum depression. Please refer to the Suresh 1898 tab in your discharge binder. If your baby will not stop crying, contact another adult to help or place the baby in its crib on its back and take a break. Never shake your baby! NOAM CARE     Vaginal bleeding will decrease in amount over the next few weeks. Cleanse your perineum from front to back using the noam-bottle after toileting until bleeding stops. You will notice that as your activity increases, your flow may also increase. This is a sign that you need to rest more often. Call your OB provider if you are saturating more than 1 maxi pad an hour and resting does not help. If used, stiches will dissolve in 4-6 weeks. To ease pain or swelling, use prescribed medications properly or use a sitz bath, if ordered. Kegel exercises will help to restore bladder control. BREAST CARE    FOR BREASTFEEDING MOMS:    If you become engorged, feeding may be more difficult or painful in 1 to 2 days. You may find it helpful to hand express some milk so that the infant can latch on more easily. While breastfeeding continue to take your prenatal vitamins as directed. Refer to the breastfeeding information in the discharge binder. FOR NON-BREASTFEEDING MOMS:    You may apply ice packs to your breasts over your bra for 20 minutes at a time for comfort.   Do not express breast milk.  Avoid stimulation to your breasts. When showering, allow the water to strike only your back. Wear a good fitting bra, such as a sports bra, until your milk dries up    [address]    Your abdomen is tender to the touch or you have pain that cannot be relieved. Flu-like symptoms such as achy muscles and joints or you are experiencing extreme weakness or dizziness. Persistent burning or increased urgency in urination. LITERATURE PROVIDED    For Moms and Those Who Care About Them  Your [employer] Healthy Start, Grow Smart  Breastfeeding Best for [facility], [place] for Mom. [address] Parent Information About Universal [employer] Screening  Controlling pain. I have received the educational material listed above. The  Channel has provided me with the opportunity to view instructional videos pertaining to infant care and the care of myself. I verify that I have received the above information and have no further questions and feel confident to care for myself and my baby. For more information about postpartum care, baby care and feeding, create a [facility] My Chart account, sign in and search using the magnifying glass, typing in postpartum, breast feeding or formula feeding. [email or web address]       CALL YOUR DOCTOR IF EXPERIENCING ANY OF THE FOLLOWIN. If you feel you are not getting better or you are concerned. 2.  If you develop a headache, not resolved with your usual interventions. 3.  If you have changes in your vision, (blurring, blind spots, flashes of light, squiggly lines or loss of vision.)  4. If you have pain in your abdomen, up by your ribs, or nausea and vomiting. 5.  New shortness of breath or chest pain. 6.  If you have been instructed by your doctor to monitor you blood pressures, call for blood pressure over 160/100.  7.  Swelling is normal after delivery.   If swelling is increasing, especially in your hands and face, along with one of the other symptoms, call your doctor.

## 2023-02-22 NOTE — FLOWSHEET NOTE
Pt states she has a bird-like singing in her right ear. At times it is louder than other times. MD informed. No new orders received.

## 2023-02-23 VITALS
WEIGHT: 262 LBS | RESPIRATION RATE: 16 BRPM | DIASTOLIC BLOOD PRESSURE: 71 MMHG | HEART RATE: 78 BPM | TEMPERATURE: 97.9 F | BODY MASS INDEX: 41.04 KG/M2 | SYSTOLIC BLOOD PRESSURE: 115 MMHG | OXYGEN SATURATION: 97 %

## 2023-02-23 LAB
BASOPHILS ABSOLUTE: 0 K/UL (ref 0–0.2)
BASOPHILS RELATIVE PERCENT: 0.3 %
EOSINOPHILS ABSOLUTE: 0.3 K/UL (ref 0–0.6)
EOSINOPHILS RELATIVE PERCENT: 2.2 %
HCT VFR BLD CALC: 32.9 % (ref 36–48)
HEMOGLOBIN: 10.8 G/DL (ref 12–16)
LYMPHOCYTES ABSOLUTE: 2.4 K/UL (ref 1–5.1)
LYMPHOCYTES RELATIVE PERCENT: 15.3 %
MCH RBC QN AUTO: 27.5 PG (ref 26–34)
MCHC RBC AUTO-ENTMCNC: 32.9 G/DL (ref 31–36)
MCV RBC AUTO: 83.5 FL (ref 80–100)
MONOCYTES ABSOLUTE: 1.3 K/UL (ref 0–1.3)
MONOCYTES RELATIVE PERCENT: 8.2 %
NEUTROPHILS ABSOLUTE: 11.5 K/UL (ref 1.7–7.7)
NEUTROPHILS RELATIVE PERCENT: 74 %
PDW BLD-RTO: 14.5 % (ref 12.4–15.4)
PLATELET # BLD: 163 K/UL (ref 135–450)
PMV BLD AUTO: 9.6 FL (ref 5–10.5)
RBC # BLD: 3.95 M/UL (ref 4–5.2)
WBC # BLD: 15.5 K/UL (ref 4–11)

## 2023-02-23 PROCEDURE — 6370000000 HC RX 637 (ALT 250 FOR IP): Performed by: OBSTETRICS & GYNECOLOGY

## 2023-02-23 PROCEDURE — 85025 COMPLETE CBC W/AUTO DIFF WBC: CPT

## 2023-02-23 RX ADMIN — OXYCODONE HYDROCHLORIDE 5 MG: 5 TABLET ORAL at 06:45

## 2023-02-23 RX ADMIN — FUROSEMIDE 20 MG: 20 TABLET ORAL at 10:25

## 2023-02-23 RX ADMIN — FAMOTIDINE 20 MG: 20 TABLET, FILM COATED ORAL at 10:25

## 2023-02-23 RX ADMIN — ACETAMINOPHEN 1000 MG: 500 TABLET ORAL at 10:25

## 2023-02-23 RX ADMIN — CETIRIZINE HYDROCHLORIDE 10 MG: 10 TABLET, FILM COATED ORAL at 10:25

## 2023-02-23 RX ADMIN — DOCUSATE SODIUM 100 MG: 100 CAPSULE, LIQUID FILLED ORAL at 10:25

## 2023-02-23 RX ADMIN — ACETAMINOPHEN 1000 MG: 500 TABLET ORAL at 00:23

## 2023-02-23 ASSESSMENT — PAIN - FUNCTIONAL ASSESSMENT: PAIN_FUNCTIONAL_ASSESSMENT: ACTIVITIES ARE NOT PREVENTED

## 2023-02-23 ASSESSMENT — PAIN SCALES - GENERAL
PAINLEVEL_OUTOF10: 5
PAINLEVEL_OUTOF10: 0
PAINLEVEL_OUTOF10: 3

## 2023-02-23 ASSESSMENT — PAIN DESCRIPTION - LOCATION: LOCATION: BACK

## 2023-02-23 ASSESSMENT — PAIN DESCRIPTION - DESCRIPTORS: DESCRIPTORS: OTHER (COMMENT);CRAMPING

## 2023-02-23 NOTE — PLAN OF CARE
Problem: Postpartum  Goal: Experiences normal postpartum course  Description:  Postpartum OB-Pregnancy care plan goal which identifies if the mother is experiencing a normal postpartum course  Outcome: Progressing  Goal: Incisions, wounds, or drain sites healing without S/S of infection  Outcome: Progressing     Problem: Pain  Goal: Verbalizes/displays adequate comfort level or baseline comfort level  Outcome: Progressing     Problem: Infection - Adult  Goal: Absence of infection at discharge  Outcome: Progressing  Goal: Absence of infection during hospitalization  Outcome: Progressing  Goal: Absence of fever/infection during anticipated neutropenic period  Outcome: Progressing     Problem: Safety - Adult  Goal: Free from fall injury  Outcome: Progressing     Problem: Discharge Planning  Goal: Discharge to home or other facility with appropriate resources  Outcome: Progressing     Problem: Chronic Conditions and Co-morbidities  Goal: Patient's chronic conditions and co-morbidity symptoms are monitored and maintained or improved  Outcome: Progressing

## 2023-02-23 NOTE — LACTATION NOTE
This note was copied from a baby's chart. Lactation Progress Note      Data:   Follow-up. Mother resting. NB asleep in crib. FOB sitting in room. Action: LC offered to answer any questions. Mother informed of [facility] [place] availability. [facility] card and [employer]. Mother encouraged to attend [facility]. Response: Mother reports breastfeeding has improved. Mother denies needs or questions. Mother voiced understanding about the support provided at [facility].

## 2023-02-23 NOTE — FLOWSHEET NOTE
Postpartum and infant care teaching completed and forms signed by patient. Copy witnessed by RN and given to patient. Patient verbalized understanding of all teaching points. Prescriptions filled by outpatient pharmacy. Patient plans to follow-up with [facility] Provider as instructed in 6 weeks. Patient verbalizes understanding of discharge instructions and denies further questions. ID bands checked. Mother's ID band and one of baby's ID bands removed and taped to footprint sheet, signed by patient and witnessed by RN. Patient discharged in stable condition accompanied by family/guardian. Discharged in wheelchair, holding baby in arms.

## 2023-02-23 NOTE — PROGRESS NOTES
CLINICAL PHARMACY NOTE: MEDS TO BEDS    Total # of Prescriptions Filled: 2   The following medications were delivered to the patient:  Current Discharge Medication List        START taking these medications    Details   oxyCODONE (ROXICODONE) 5 MG immediate release tablet Take 1 tablet by mouth every 4 hours as needed for Pain for up to 3 days.  Max Daily Amount: 30 mg  Qty: 12 tablet, Refills: 0    Comments: Reduce doses taken as pain becomes manageable  Associated Diagnoses: Vaginal delivery      acetaminophen (TYLENOL) 500 MG tablet Take 1 tablet by mouth every 6 hours as needed for Pain  Qty: 30 tablet, Refills: 0               Additional Documentation:  Pt signed   Mardelle Due rn ok deliver

## 2023-02-23 NOTE — PROGRESS NOTES
Department of Obstetrics and Gynecology  Labor and Delivery  Post Partum Progress Note      SUBJECTIVE:  Patient is a 24-year-old  PPD#2 s/p . Pain is well controlled with current medication. Mild lochia. Patient is ambulating well and voiding without problem. Patient notes that she is passing flatus but no BM at this time. Patient is breastfeeding and stated that baby is doing well and cluster fed throughout night. Patient noted that she is having some discomfort under her breasts that she describes as \"bubbles\" that dissipate with pain medication. Discomfort is unrelated to respiration. Patient is also experiencing burning discomfort in her thighs since delivery and has the sensation that her legs may give out when she is changing her pad in the restroom. Patient may have had minor episode of dizziness when she used restroom but noted that it was mild and she was unsure if it's due to slope in floor. Patient denies headache, changes in vision, chest pain, or shortness of breath. OBJECTIVE:      Vitals:  /75   Pulse 74   Temp 97.1 °F (36.2 °C) (Oral)   Resp 16   Wt 262 lb (118.8 kg)   SpO2 100%   Breastfeeding Unknown   BMI 41.04 kg/m²     CONSTITUTIONAL:  awake, alert, cooperative, no apparent distress, and appears stated age  LUNGS:  No increased work of breathing, good air exchange, clear to auscultation bilaterally.   CARDIOVASCULAR:  normal S1 and S2, trace edema in BLE    DATA:    CBC:    Lab Results   Component Value Date/Time    WBC 15.5 2023 06:20 AM    RBC 3.95 2023 06:20 AM    HGB 10.8 2023 06:20 AM    HCT 32.9 2023 06:20 AM    MCV 83.5 2023 06:20 AM    RDW 14.5 2023 06:20 AM     2023 06:20 AM       ASSESSMENT & PLAN:      Principal Problem:    Term pregnancy  Plan: Continue Routine Care  1) Maintain pain control  2) Encourage ambulation  3) Continue laceration care    Discharge home today

## 2023-04-21 PROBLEM — Z34.90 TERM PREGNANCY: Status: RESOLVED | Noted: 2023-02-20 | Resolved: 2023-04-21

## 2023-05-01 ENCOUNTER — OFFICE VISIT (OUTPATIENT)
Dept: PRIMARY CARE CLINIC | Age: 33
End: 2023-05-01
Payer: COMMERCIAL

## 2023-05-01 VITALS
OXYGEN SATURATION: 98 % | DIASTOLIC BLOOD PRESSURE: 84 MMHG | HEIGHT: 67 IN | WEIGHT: 225.6 LBS | HEART RATE: 78 BPM | SYSTOLIC BLOOD PRESSURE: 122 MMHG | BODY MASS INDEX: 35.41 KG/M2

## 2023-05-01 DIAGNOSIS — Z00.00 PREVENTATIVE HEALTH CARE: Primary | ICD-10-CM

## 2023-05-01 PROCEDURE — 99395 PREV VISIT EST AGE 18-39: CPT | Performed by: FAMILY MEDICINE

## 2023-05-01 RX ORDER — ACETAMINOPHEN AND CODEINE PHOSPHATE 120; 12 MG/5ML; MG/5ML
1 SOLUTION ORAL DAILY
COMMUNITY
Start: 2023-04-05

## 2023-05-01 ASSESSMENT — PATIENT HEALTH QUESTIONNAIRE - PHQ9
8. MOVING OR SPEAKING SO SLOWLY THAT OTHER PEOPLE COULD HAVE NOTICED. OR THE OPPOSITE, BEING SO FIGETY OR RESTLESS THAT YOU HAVE BEEN MOVING AROUND A LOT MORE THAN USUAL: 0
SUM OF ALL RESPONSES TO PHQ QUESTIONS 1-9: 12
6. FEELING BAD ABOUT YOURSELF - OR THAT YOU ARE A FAILURE OR HAVE LET YOURSELF OR YOUR FAMILY DOWN: 2
1. LITTLE INTEREST OR PLEASURE IN DOING THINGS: 1
4. FEELING TIRED OR HAVING LITTLE ENERGY: 3
SUM OF ALL RESPONSES TO PHQ QUESTIONS 1-9: 12
SUM OF ALL RESPONSES TO PHQ QUESTIONS 1-9: 12
5. POOR APPETITE OR OVEREATING: 3
10. IF YOU CHECKED OFF ANY PROBLEMS, HOW DIFFICULT HAVE THESE PROBLEMS MADE IT FOR YOU TO DO YOUR WORK, TAKE CARE OF THINGS AT HOME, OR GET ALONG WITH OTHER PEOPLE: 1
3. TROUBLE FALLING OR STAYING ASLEEP: 1
SUM OF ALL RESPONSES TO PHQ9 QUESTIONS 1 & 2: 2
SUM OF ALL RESPONSES TO PHQ QUESTIONS 1-9: 12
7. TROUBLE CONCENTRATING ON THINGS, SUCH AS READING THE NEWSPAPER OR WATCHING TELEVISION: 1
2. FEELING DOWN, DEPRESSED OR HOPELESS: 1
9. THOUGHTS THAT YOU WOULD BE BETTER OFF DEAD, OR OF HURTING YOURSELF: 0

## 2023-05-01 ASSESSMENT — ENCOUNTER SYMPTOMS
SORE THROAT: 0
CONSTIPATION: 0
CHEST TIGHTNESS: 0
ABDOMINAL PAIN: 0
RHINORRHEA: 0
SHORTNESS OF BREATH: 0
DIARRHEA: 0

## 2023-05-01 ASSESSMENT — ANXIETY QUESTIONNAIRES
3. WORRYING TOO MUCH ABOUT DIFFERENT THINGS: 3
4. TROUBLE RELAXING: 3
2. NOT BEING ABLE TO STOP OR CONTROL WORRYING: 1
7. FEELING AFRAID AS IF SOMETHING AWFUL MIGHT HAPPEN: 1
6. BECOMING EASILY ANNOYED OR IRRITABLE: 2
IF YOU CHECKED OFF ANY PROBLEMS ON THIS QUESTIONNAIRE, HOW DIFFICULT HAVE THESE PROBLEMS MADE IT FOR YOU TO DO YOUR WORK, TAKE CARE OF THINGS AT HOME, OR GET ALONG WITH OTHER PEOPLE: SOMEWHAT DIFFICULT
5. BEING SO RESTLESS THAT IT IS HARD TO SIT STILL: 0
1. FEELING NERVOUS, ANXIOUS, OR ON EDGE: 1
GAD7 TOTAL SCORE: 11

## 2023-05-01 NOTE — PROGRESS NOTES
Annual Exam  ROSIE Villegas is a 35 y.o. female who presents for an annual exam- last exam was on 2022. 2 months post partum feeling overwhelmed- not able to make time for self due to baby needs- cries a lot from gas, not taking bottle so mother stays close by.  and mother very supportive but baby prefers mother which makes it stressful. Sleeps with baby on chest as he wont sleep in crib.  works 3 days from home- helps with ADLs. Patient's Mother believes in prayer and ely solving depression . No suicidal ideation/homicidal ideation or thoughts of harming baby- \" I love my baby and wont hurt him. \"  Baby born Feb 21st  Returning to work June 1st.  Not seeing psychologist.    Chief Complaint   Patient presents with    Annual Exam     Cc: concerns with possible post partum      PHQ-2/9 Depression Screening from Assessments   PHQ9 Depression scale:   PHQ Scores 5/1/2023 4/29/2022 4/29/2021 1/29/2021 1/30/2020 7/29/2019   PHQ2 Score 2 0 0 0 0 0   PHQ9 Score 12 2 0 0 0 0      (1-4 = Minimal depression, 5-9 = Mild depression, 10-14 = Moderate depression, 15-19 = Moderately severe depression, 20-27 = Severe depression)   MENSTRUAL HISTORY  No LMP recorded. Health Maintenance Due   Topic Date Due    COVID-19 Vaccine (5 - Booster) 03/25/2022     Current Outpatient Medications on File Prior to Visit   Medication Sig Dispense Refill    norethindrone (MICRONOR) 0.35 MG tablet Take 1 tablet by mouth daily      Prenatal MV-Min-Fe Fum-FA-DHA (PRENATAL 1 PO) Take by mouth      azelastine (ASTELIN) 0.1 % nasal spray 137 sprays by Nasal route daily      fluticasone (FLONASE) 50 MCG/ACT nasal spray 2 sprays by Nasal route       No current facility-administered medications on file prior to visit.       Past Medical History:   Diagnosis Date    Alpha 0-thalassemia ([facility] [address]     FOB negative    Anxiety     Headache     Herpes

## 2023-05-13 SDOH — ECONOMIC STABILITY: INCOME INSECURITY: HOW HARD IS IT FOR YOU TO PAY FOR THE VERY BASICS LIKE FOOD, HOUSING, MEDICAL CARE, AND HEATING?: NOT HARD AT ALL

## 2023-05-13 SDOH — ECONOMIC STABILITY: HOUSING INSECURITY
IN THE LAST 12 MONTHS, WAS THERE A TIME WHEN YOU DID NOT HAVE A STEADY PLACE TO SLEEP OR SLEPT IN A SHELTER (INCLUDING NOW)?: NO

## 2023-05-13 SDOH — ECONOMIC STABILITY: TRANSPORTATION INSECURITY
IN THE PAST 12 MONTHS, HAS LACK OF TRANSPORTATION KEPT YOU FROM MEETINGS, WORK, OR FROM GETTING THINGS NEEDED FOR DAILY LIVING?: NO

## 2023-05-13 SDOH — ECONOMIC STABILITY: FOOD INSECURITY: WITHIN THE PAST 12 MONTHS, THE FOOD YOU BOUGHT JUST DIDN'T LAST AND YOU DIDN'T HAVE MONEY TO GET MORE.: NEVER TRUE

## 2023-05-13 SDOH — ECONOMIC STABILITY: FOOD INSECURITY: WITHIN THE PAST 12 MONTHS, YOU WORRIED THAT YOUR FOOD WOULD RUN OUT BEFORE YOU GOT MONEY TO BUY MORE.: NEVER TRUE

## 2023-05-16 ENCOUNTER — TELEMEDICINE (OUTPATIENT)
Dept: PRIMARY CARE CLINIC | Age: 33
End: 2023-05-16
Payer: COMMERCIAL

## 2023-05-16 DIAGNOSIS — F41.9 ANXIETY: ICD-10-CM

## 2023-05-16 PROCEDURE — G8417 CALC BMI ABV UP PARAM F/U: HCPCS | Performed by: FAMILY MEDICINE

## 2023-05-16 PROCEDURE — 99214 OFFICE O/P EST MOD 30 MIN: CPT | Performed by: FAMILY MEDICINE

## 2023-05-16 PROCEDURE — G8427 DOCREV CUR MEDS BY ELIG CLIN: HCPCS | Performed by: FAMILY MEDICINE

## 2023-05-16 PROCEDURE — 1036F TOBACCO NON-USER: CPT | Performed by: FAMILY MEDICINE

## 2023-05-16 RX ORDER — SERTRALINE HYDROCHLORIDE 25 MG/1
25 TABLET, FILM COATED ORAL DAILY
Qty: 30 TABLET | Refills: 0 | Status: SHIPPED | OUTPATIENT
Start: 2023-05-16

## 2023-05-16 ASSESSMENT — PATIENT HEALTH QUESTIONNAIRE - PHQ9
SUM OF ALL RESPONSES TO PHQ QUESTIONS 1-9: 11
2. FEELING DOWN, DEPRESSED OR HOPELESS: 1
1. LITTLE INTEREST OR PLEASURE IN DOING THINGS: 1
SUM OF ALL RESPONSES TO PHQ QUESTIONS 1-9: 11
10. IF YOU CHECKED OFF ANY PROBLEMS, HOW DIFFICULT HAVE THESE PROBLEMS MADE IT FOR YOU TO DO YOUR WORK, TAKE CARE OF THINGS AT HOME, OR GET ALONG WITH OTHER PEOPLE: 1
SUM OF ALL RESPONSES TO PHQ9 QUESTIONS 1 & 2: 2
9. THOUGHTS THAT YOU WOULD BE BETTER OFF DEAD, OR OF HURTING YOURSELF: 0
4. FEELING TIRED OR HAVING LITTLE ENERGY: 2
7. TROUBLE CONCENTRATING ON THINGS, SUCH AS READING THE NEWSPAPER OR WATCHING TELEVISION: 1
8. MOVING OR SPEAKING SO SLOWLY THAT OTHER PEOPLE COULD HAVE NOTICED. OR THE OPPOSITE, BEING SO FIGETY OR RESTLESS THAT YOU HAVE BEEN MOVING AROUND A LOT MORE THAN USUAL: 0
SUM OF ALL RESPONSES TO PHQ QUESTIONS 1-9: 11
SUM OF ALL RESPONSES TO PHQ QUESTIONS 1-9: 11
3. TROUBLE FALLING OR STAYING ASLEEP: 2
5. POOR APPETITE OR OVEREATING: 2
6. FEELING BAD ABOUT YOURSELF - OR THAT YOU ARE A FAILURE OR HAVE LET YOURSELF OR YOUR FAMILY DOWN: 2

## 2023-05-16 ASSESSMENT — COLUMBIA-SUICIDE SEVERITY RATING SCALE - C-SSRS
1. WITHIN THE PAST MONTH, HAVE YOU WISHED YOU WERE DEAD OR WISHED YOU COULD GO TO SLEEP AND NOT WAKE UP?: NO
2. HAVE YOU ACTUALLY HAD ANY THOUGHTS OF KILLING YOURSELF?: NO
6. HAVE YOU EVER DONE ANYTHING, STARTED TO DO ANYTHING, OR PREPARED TO DO ANYTHING TO END YOUR LIFE?: NO
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
## 2023-05-17 ASSESSMENT — ENCOUNTER SYMPTOMS
ABDOMINAL PAIN: 0
SHORTNESS OF BREATH: 0
SORE THROAT: 0
RHINORRHEA: 0
DIARRHEA: 0
CONSTIPATION: 0
CHEST TIGHTNESS: 0

## 2023-05-18 ENCOUNTER — INITIAL CONSULT (OUTPATIENT)
Dept: PSYCHOLOGY | Age: 33
End: 2023-05-18

## 2023-05-18 DIAGNOSIS — F41.9 ANXIETY DISORDER, UNSPECIFIED TYPE: ICD-10-CM
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
## 2023-05-18 NOTE — PATIENT INSTRUCTIONS
- Zoloft: look for increased task initiation and mood regulation  - reach out to insurance regarding options for traditional therapy + behavioral health consultation  - contact  to ask for suggestions/plans regarding transitioning into   - increase exposures to being away from the baby

## 2023-05-22 NOTE — PROGRESS NOTES
Behavioral Health Consultation  Cayla Wagner Psy.D. Psychologist  5/23/2023      Time spent with Patient: 25 minutes  This is patient's second [facility] appointment. Reason for Consult:    Chief Complaint   Patient presents with    Depression    Anxiety     Referring Provider: MD Ro [address],  [address]    Feedback given to PCP. TELEHEALTH VISIT -- [address] [place], was evaluated through a synchronous (real-time) audio-video encounter. The patient (or guardian if applicable) is aware that this is a billable service, which includes applicable co-pays. This Virtual Visit was conducted with patient's (and/or legal guardian's) consent. Patient identification was verified, and a caregiver was present when appropriate. The patient was located in a state where the provider was licensed to provide care. Conducted a risk-benefit analysis and determined that the patient's presenting problems are consistent with the use of telepsychology. Determined that the patient and/or guardian has sufficient knowledge and skills in the use of technology enabling them to adequately benefit from telepsychology. It was determined that this patient was able to be properly treated without an in-person session. Patient or guardian verified that they were currently located at the [facility] address that was provided during registration. Discussed consent form for telehealth and patient or guardian provided verbal consent.     Verified the following information:  Patient's identification: Yes  Patient location: [address] [place] [address]   Patient's call back number: [phone]   Patient's emergency contact's name and number, as well as permission to contact them if needed: Extended Emergency Contact Information  Primary Emergency Contact: Jneae Borrego  Address: [address] [place] [address] Phone:

## 2023-05-23 ENCOUNTER — TELEMEDICINE (OUTPATIENT)
Dept: PSYCHOLOGY | Age: 33
End: 2023-05-23
Payer: COMMERCIAL

## 2023-05-23 DIAGNOSIS — F41.9 ANXIETY DISORDER, UNSPECIFIED TYPE: ICD-10-CM

## 2023-05-23 PROCEDURE — 90832 PSYTX W PT 30 MINUTES: CPT | Performed by: PSYCHOLOGIST

## 2023-05-30 NOTE — PROGRESS NOTES
Behavioral Health Consultation  Daquan Llamas Psy.D. Psychologist  2023        Time spent with client or family: 25 minutes  This is patient's third  [facility] appointment. Reason for Consult:    Chief Complaint   Patient presents with    Depression     Referring Provider: MD Ro [address]    Feedback given to PCP. S:  Antonio Stevens will be going with her infant son to the  tomorrow and the next day to observe and help him ease in. On Monday, he will spend a few hours there while she grocery shops/cleans. She is feeling cautiously optimistic about this. She has noticed more task initiation, has been working on behavioral activation. O:  MSE:  Appearance    alert, cooperative  Appetite improving  Sleep disturbance Yes  Fatigue Yes  Loss of pleasure improving  Impulsive behavior No  Speech    normal rate and normal volume  Mood    euthymic   Affect    normal affect  Thought Content    intact  Thought Process    linear  Associations    logical connections  Insight    Good  Judgment    Intact  Orientation    oriented to person, place, time, and general circumstances  Memory    recent and remote memory intact  Attention/Concentration    intact  Morbid ideation No  Suicide Assessment    no suicidal ideation    A:  Antonio Stevens presents for a follow up [facility] appointment regarding concerns related to depression and anxiety during the  period. These symptoms are are improving some, as she presents as slightly brighter than previous appointment, reporting better task-initiation, feeling more connected with . Safety concerns reported include: none. Diagnosis:  1. Postpartum depression    2. Anxiety disorder, unspecified type        Plan:  Pt interventions:  Reviewed progress and provided praise for effective coping.  Discussed upcoming transition to /work in detail, reviewing what questions she wants to ask the  provider and flexible

## 2023-05-31 ENCOUNTER — OFFICE VISIT (OUTPATIENT)
Dept: PSYCHOLOGY | Age: 33
End: 2023-05-31
Payer: COMMERCIAL

## 2023-05-31 DIAGNOSIS — F41.9 ANXIETY DISORDER, UNSPECIFIED TYPE: ICD-10-CM

## 2023-05-31 PROCEDURE — 90832 PSYTX W PT 30 MINUTES: CPT | Performed by: PSYCHOLOGIST

## 2023-05-31 PROCEDURE — 1036F TOBACCO NON-USER: CPT | Performed by: PSYCHOLOGIST

## 2023-06-01 ENCOUNTER — OFFICE VISIT (OUTPATIENT)
Dept: PRIMARY CARE CLINIC | Age: 33
End: 2023-06-01
Payer: COMMERCIAL

## 2023-06-01 VITALS
SYSTOLIC BLOOD PRESSURE: 132 MMHG | TEMPERATURE: 97.2 F | HEIGHT: 66 IN | BODY MASS INDEX: 34.72 KG/M2 | OXYGEN SATURATION: 96 % | WEIGHT: 216 LBS | DIASTOLIC BLOOD PRESSURE: 86 MMHG | HEART RATE: 82 BPM

## 2023-06-01 DIAGNOSIS — F41.9 ANXIETY: ICD-10-CM

## 2023-06-01 PROCEDURE — 1036F TOBACCO NON-USER: CPT | Performed by: FAMILY MEDICINE

## 2023-06-01 PROCEDURE — G8427 DOCREV CUR MEDS BY ELIG CLIN: HCPCS | Performed by: FAMILY MEDICINE

## 2023-06-01 PROCEDURE — 99214 OFFICE O/P EST MOD 30 MIN: CPT | Performed by: FAMILY MEDICINE

## 2023-06-01 PROCEDURE — G8417 CALC BMI ABV UP PARAM F/U: HCPCS | Performed by: FAMILY MEDICINE

## 2023-06-01 NOTE — PROGRESS NOTES
Subjective:   Patient ID: Claritza Gill is a 35 y.o. female. HPI by clinical support staff:   Chief Complaint   Patient presents with    Follow-up     Post partum depression-progress       Preliminary data above this line collected by clinical support staff.    ______________________________________________________________________  HPI by Provider:   HPI   Patient presents today  for follow up  on post partum depression and anxiety. Working with in house psychologist and has noticed about a 40% improvement. 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 States compliance with sertraline without side effects. Data above this line collected by Provider. Patient's medications, allergies, past medical, surgical, social and family histories were reviewed and updated as appropriate. Patient Care Team:  Madeleine Taylor MD as PCP - General (Family Medicine)  Madeleine Taylor MD as PCP [facility]  Current Outpatient Medications on File Prior to Visit   Medication Sig Dispense Refill    sertraline (ZOLOFT) 25 MG tablet Take 1 tablet by mouth daily 30 tablet 0    norethindrone (MICRONOR) 0.35 MG tablet Take 1 tablet by mouth daily      Prenatal MV-Min-Fe Fum-FA-DHA (PRENATAL 1 PO) Take by mouth      azelastine (ASTELIN) 0.1 % nasal spray 137 sprays by Nasal route daily      fluticasone (FLONASE) 50 MCG/ACT nasal spray 2 sprays by Nasal route       No current facility-administered medications on file prior to visit. Review of Systems   Constitutional:  Negative for activity change, appetite change, fatigue and fever. HENT:  Negative for congestion, rhinorrhea and sore throat. Respiratory:  Negative for chest tightness and shortness of breath. Cardiovascular:  Negative for chest pain, palpitations and leg swelling. Gastrointestinal:  Negative for abdominal pain, constipation and diarrhea. Genitourinary:  Negative for dysuria and frequency.    Musculoskeletal:

## 2023-06-04 ASSESSMENT — ENCOUNTER SYMPTOMS
SORE THROAT: 0
ABDOMINAL PAIN: 0
SHORTNESS OF BREATH: 0
DIARRHEA: 0
CHEST TIGHTNESS: 0
CONSTIPATION: 0
RHINORRHEA: 0

## 2023-06-08 ENCOUNTER — PATIENT MESSAGE (OUTPATIENT)
Dept: PRIMARY CARE CLINIC | Age: 33
End: 2023-06-08

## 2023-06-09 NOTE — TELEPHONE ENCOUNTER
From: Ligia Bradford  To: Dr. Alma Henry: 6/8/2023 5:49 PM EDT  Subject: Kirby Rodriguez is congested    Hi Dr. Alicia Domínguez,     It didnt give me the option to send you a message on [employer] chart but he is very congested, sneezing, has a runny nose, and a slight cough. Im not sure if its allergies or a cold. He did start an in-home  this week. Should we just monitor him for a fever or should we give him any medicine?     Thanks,  Rosario Barnes

## 2023-06-16 DIAGNOSIS — F41.9 ANXIETY: ICD-10-CM

## 2023-06-17 RX ORDER — SERTRALINE HYDROCHLORIDE 25 MG/1
25 TABLET, FILM COATED ORAL DAILY
Qty: 90 TABLET | Refills: 1 | Status: SHIPPED | OUTPATIENT
Start: 2023-06-17 | End: 2023-07-31 | Stop reason: SDUPTHER

## 2023-06-26 ENCOUNTER — OFFICE VISIT (OUTPATIENT)
Dept: PSYCHOLOGY | Age: 33
End: 2023-06-26
Payer: COMMERCIAL

## 2023-06-26 ENCOUNTER — OFFICE VISIT (OUTPATIENT)
Dept: PRIMARY CARE CLINIC | Age: 33
End: 2023-06-26
Payer: COMMERCIAL

## 2023-06-26 VITALS
DIASTOLIC BLOOD PRESSURE: 86 MMHG | HEIGHT: 67 IN | HEART RATE: 82 BPM | OXYGEN SATURATION: 98 % | BODY MASS INDEX: 34.21 KG/M2 | TEMPERATURE: 98 F | WEIGHT: 218 LBS | SYSTOLIC BLOOD PRESSURE: 130 MMHG | RESPIRATION RATE: 16 BRPM

## 2023-06-26 DIAGNOSIS — F41.9 ANXIETY DISORDER, UNSPECIFIED TYPE: ICD-10-CM

## 2023-06-26 DIAGNOSIS — F41.9 ANXIETY: ICD-10-CM

## 2023-06-26 PROCEDURE — G8417 CALC BMI ABV UP PARAM F/U: HCPCS | Performed by: FAMILY MEDICINE

## 2023-06-26 PROCEDURE — 99214 OFFICE O/P EST MOD 30 MIN: CPT | Performed by: FAMILY MEDICINE

## 2023-06-26 PROCEDURE — 90832 PSYTX W PT 30 MINUTES: CPT | Performed by: PSYCHOLOGIST

## 2023-06-26 PROCEDURE — 1036F TOBACCO NON-USER: CPT | Performed by: FAMILY MEDICINE

## 2023-06-26 PROCEDURE — G8427 DOCREV CUR MEDS BY ELIG CLIN: HCPCS | Performed by: FAMILY MEDICINE

## 2023-06-26 PROCEDURE — 1036F TOBACCO NON-USER: CPT | Performed by: PSYCHOLOGIST

## 2023-06-26 ASSESSMENT — ENCOUNTER SYMPTOMS
SHORTNESS OF BREATH: 0
DIARRHEA: 0
CHEST TIGHTNESS: 0
ABDOMINAL PAIN: 0
SORE THROAT: 0
CONSTIPATION: 0
RHINORRHEA: 0

## 2023-07-17 ENCOUNTER — OFFICE VISIT (OUTPATIENT)
Dept: PSYCHOLOGY | Age: 33
End: 2023-07-17
Payer: COMMERCIAL

## 2023-07-17 DIAGNOSIS — F41.9 ANXIETY DISORDER, UNSPECIFIED TYPE: ICD-10-CM

## 2023-07-17 PROCEDURE — 90832 PSYTX W PT 30 MINUTES: CPT | Performed by: PSYCHOLOGIST

## 2023-07-17 PROCEDURE — 1036F TOBACCO NON-USER: CPT | Performed by: PSYCHOLOGIST

## 2023-07-18 NOTE — PROGRESS NOTES
Behavioral Health Consultation  Josephine Catalan Psy.D. Psychologist  7/18/2023        Time spent with client or family: 25 minutes  This is patient's sixth  [facility] appointment. Reason for Consult:    Chief Complaint   Patient presents with    Depression    Anxiety     Referring Provider: Austen Jorge MD  [address] [facility],  [address]    Feedback given to PCP. S:  Jerrod Lyn reports that she is doing well starting to work more. Her infant son is adjusting well to . He will be going to full time care soon and she is feeling optimistic about this. His sleep has recently regressed. Jerrod Lyn is becoming increasingly anxious about adjusting to her 's upcoming schedule changes (football season). She has attempted to talk to him about this but they have not created a care plan for this change. She has been using co-workers effectively for social support. O:  MSE:  Appearance    alert, cooperative  Appetite normal  Sleep disturbance Yes  Fatigue Yes  Loss of pleasure No  Impulsive behavior No  Speech    normal rate and normal volume  Mood    euthymic   Affect    normal affect  Thought Content    intact  Thought Process    linear  Associations    logical connections  Insight    Good  Judgment    Intact  Orientation    oriented to person, place, time, and general circumstances  Memory    recent and remote memory intact  Attention/Concentration    intact  Morbid ideation No  Suicide Assessment    no suicidal ideation    A:  Jerrod Lyn presents for a follow up [facility] appointment regarding concerns related to depression and anxiety. These symptoms are are improving. Safety concerns reported include: none. Diagnosis:  1. Postpartum depression    2. Anxiety disorder, unspecified type        Plan:  Pt interventions:  Reviewed progress and provided praise for effective coping. Discussed creating a proactive plan for Jerrod Lyn and her family for childcare during football season.  Discussed need to

## 2023-07-31 ENCOUNTER — OFFICE VISIT (OUTPATIENT)
Dept: PRIMARY CARE CLINIC | Age: 33
End: 2023-07-31
Payer: COMMERCIAL

## 2023-07-31 VITALS
SYSTOLIC BLOOD PRESSURE: 132 MMHG | DIASTOLIC BLOOD PRESSURE: 86 MMHG | BODY MASS INDEX: 34.56 KG/M2 | OXYGEN SATURATION: 98 % | TEMPERATURE: 98.9 F | HEIGHT: 67 IN | WEIGHT: 220.2 LBS | RESPIRATION RATE: 16 BRPM | HEART RATE: 71 BPM

## 2023-07-31 DIAGNOSIS — F41.9 ANXIETY: ICD-10-CM

## 2023-07-31 PROBLEM — N87.9 DYSPLASIA OF CERVIX: Status: ACTIVE | Noted: 2019-07-26

## 2023-07-31 PROCEDURE — G8417 CALC BMI ABV UP PARAM F/U: HCPCS | Performed by: FAMILY MEDICINE

## 2023-07-31 PROCEDURE — 1036F TOBACCO NON-USER: CPT | Performed by: FAMILY MEDICINE

## 2023-07-31 PROCEDURE — 99214 OFFICE O/P EST MOD 30 MIN: CPT | Performed by: FAMILY MEDICINE

## 2023-07-31 PROCEDURE — G8427 DOCREV CUR MEDS BY ELIG CLIN: HCPCS | Performed by: FAMILY MEDICINE

## 2023-07-31 ASSESSMENT — ENCOUNTER SYMPTOMS
SHORTNESS OF BREATH: 0
RHINORRHEA: 0
DIARRHEA: 0
CHEST TIGHTNESS: 0
ABDOMINAL PAIN: 0
SORE THROAT: 0
CONSTIPATION: 0

## 2023-07-31 NOTE — PROGRESS NOTES
psychologist.  Plan to follow-up in about 2 to 4 weeks or sooner if symptoms worsen. - sertraline (ZOLOFT) 50 MG tablet; Take 1 tablet by mouth daily  Dispense: 30 tablet; Refill: 0           This chart note was prepared using a voice recognition dictation program. This note was reviewed for accuracy; however, addition, deletion and sound-alike word errors may occur. If there are any questions regarding this chart note, please contact the originating provider. Electronically signed by   Austen Jorge MD  7/31/2023   12:49 PM    Return in about 4 weeks (around 8/28/2023) for Depression.

## 2023-08-03 NOTE — PROGRESS NOTES
Behavioral Health Consultation  Bashir Henning Psy.D. Psychologist  8/8/23        Time spent with client or family: 25 minutes  This is patient's seventh  [facility] appointment. Reason for Consult:    Chief Complaint   Patient presents with    Depression     Referring Provider: Gwendolyn Corona MD  [address] [facility],  [address]    Feedback given to PCP. S:  [facility] reports that she and her  sat down and created a plan for support during the football season and this has gone very well, she is getting more experienced at asking for support. She is doing well balancing work and home at this time, partly because her work remains flexible to her needs. Her son will be starting at a  center soon and she generally feels good about this but is anxious about learning to trust a new team.  [facility] increased her SSRI dose a couple of weeks ago. She has been napping when able. [facility] and her  have not yet gone out on a date but she is interested in this. She will be going away for a weekend in September and is anxious and sad about being away from her son. O:  MSE:  Appearance    alert, cooperative  Appetite normal  Sleep disturbance Yes  Fatigue Yes  Loss of pleasure improving  Impulsive behavior No  Speech    normal rate and normal volume  Mood    euthymic   Affect    normal affect  Thought Content    intact  Thought Process    linear  Associations    logical connections  Insight    Good  Judgment    Intact  Orientation    oriented to person, place, time, and general circumstances  Memory    recent and remote memory intact  Attention/Concentration    intact  Morbid ideation No  Suicide Assessment    no suicidal ideation    A:  [facility] presents for a follow up [facility] appointment regarding concerns related to depression and anxiety. These symptoms are are improving. Safety concerns reported include: none. Diagnosis:  1. Postpartum depression    2.  Anxiety disorder, unspecified type

## 2023-08-08 ENCOUNTER — NURSE ONLY (OUTPATIENT)
Dept: PRIMARY CARE CLINIC | Age: 33
End: 2023-08-08
Payer: COMMERCIAL

## 2023-08-08 ENCOUNTER — OFFICE VISIT (OUTPATIENT)
Dept: PSYCHOLOGY | Age: 33
End: 2023-08-08
Payer: COMMERCIAL

## 2023-08-08 DIAGNOSIS — R35.0 URINARY FREQUENCY: Primary | ICD-10-CM

## 2023-08-08 DIAGNOSIS — F41.9 ANXIETY DISORDER, UNSPECIFIED TYPE: ICD-10-CM

## 2023-08-08 LAB
BILIRUBIN, POC: NORMAL
BLOOD URINE, POC: NORMAL
CLARITY, POC: NORMAL
COLOR, POC: YELLOW
GLUCOSE URINE, POC: NORMAL
KETONES, POC: NORMAL
LEUKOCYTE EST, POC: NORMAL
NITRITE, POC: NORMAL
PH, POC: 6.5
PROTEIN, POC: NORMAL
SPECIFIC GRAVITY, POC: 1.02
UROBILINOGEN, POC: 0.2

## 2023-08-08 PROCEDURE — 1036F TOBACCO NON-USER: CPT | Performed by: PSYCHOLOGIST

## 2023-08-08 PROCEDURE — 90832 PSYTX W PT 30 MINUTES: CPT | Performed by: PSYCHOLOGIST

## 2023-08-08 PROCEDURE — 81002 URINALYSIS NONAUTO W/O SCOPE: CPT | Performed by: FAMILY MEDICINE

## 2023-08-09 ENCOUNTER — TELEPHONE (OUTPATIENT)
Dept: PRIMARY CARE CLINIC | Age: 33
End: 2023-08-09

## 2023-08-09 NOTE — TELEPHONE ENCOUNTER
----- Message from Denise Dorman sent at 8/9/2023 10:25 AM EDT -----  Subject: Message to Provider    QUESTIONS  Information for Provider? Patient is wanting a call back regarding   scheduling her appointment on 08/22. Patient said the appointment should   be in office. ---------------------------------------------------------------------------  --------------  Phu Dennison [facility]  [phone]; OK to leave message on voicemail,OK to respond with electronic   message via [employer] portal (only for patients who have registered [employer]   account)  ---------------------------------------------------------------------------  --------------  SCRIPT ANSWERS  Relationship to Patient?  Self

## 2023-08-10 RX ORDER — SULFAMETHOXAZOLE AND TRIMETHOPRIM 800; 160 MG/1; MG/1
1 TABLET ORAL 2 TIMES DAILY
Qty: 10 TABLET | Refills: 0 | Status: SHIPPED | OUTPATIENT
Start: 2023-08-10 | End: 2023-08-15

## 2023-08-11 LAB
BACTERIA UR CULT: ABNORMAL
ORGANISM: ABNORMAL

## 2023-08-22 ENCOUNTER — OFFICE VISIT (OUTPATIENT)
Dept: PSYCHOLOGY | Age: 33
End: 2023-08-22
Payer: COMMERCIAL

## 2023-08-22 DIAGNOSIS — F41.9 ANXIETY DISORDER, UNSPECIFIED TYPE: ICD-10-CM

## 2023-08-22 PROCEDURE — 90832 PSYTX W PT 30 MINUTES: CPT | Performed by: PSYCHOLOGIST

## 2023-08-22 PROCEDURE — 1036F TOBACCO NON-USER: CPT | Performed by: PSYCHOLOGIST
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
## 2023-08-28 ENCOUNTER — TELEMEDICINE (OUTPATIENT)
Dept: PRIMARY CARE CLINIC | Age: 33
End: 2023-08-28
Payer: COMMERCIAL

## 2023-08-28 DIAGNOSIS — F41.9 ANXIETY: ICD-10-CM

## 2023-08-28 DIAGNOSIS — R41.840 INATTENTION: Primary | ICD-10-CM

## 2023-08-28 PROCEDURE — 99214 OFFICE O/P EST MOD 30 MIN: CPT | Performed by: FAMILY MEDICINE

## 2023-08-28 PROCEDURE — G8427 DOCREV CUR MEDS BY ELIG CLIN: HCPCS | Performed by: FAMILY MEDICINE

## 2023-08-28 PROCEDURE — G8417 CALC BMI ABV UP PARAM F/U: HCPCS | Performed by: FAMILY MEDICINE

## 2023-08-28 PROCEDURE — 1036F TOBACCO NON-USER: CPT | Performed by: FAMILY MEDICINE

## 2023-08-28 ASSESSMENT — PATIENT HEALTH QUESTIONNAIRE - PHQ9
8. MOVING OR SPEAKING SO SLOWLY THAT OTHER PEOPLE COULD HAVE NOTICED. OR THE OPPOSITE, BEING SO FIGETY OR RESTLESS THAT YOU HAVE BEEN MOVING AROUND A LOT MORE THAN USUAL: 1
9. THOUGHTS THAT YOU WOULD BE BETTER OFF DEAD, OR OF HURTING YOURSELF: 0
5. POOR APPETITE OR OVEREATING: 2
2. FEELING DOWN, DEPRESSED OR HOPELESS: 0
SUM OF ALL RESPONSES TO PHQ QUESTIONS 1-9: 9
1. LITTLE INTEREST OR PLEASURE IN DOING THINGS: 0
SUM OF ALL RESPONSES TO PHQ9 QUESTIONS 1 & 2: 0
3. TROUBLE FALLING OR STAYING ASLEEP: 2
SUM OF ALL RESPONSES TO PHQ QUESTIONS 1-9: 9
7. TROUBLE CONCENTRATING ON THINGS, SUCH AS READING THE NEWSPAPER OR WATCHING TELEVISION: 2
4. FEELING TIRED OR HAVING LITTLE ENERGY: 2
10. IF YOU CHECKED OFF ANY PROBLEMS, HOW DIFFICULT HAVE THESE PROBLEMS MADE IT FOR YOU TO DO YOUR WORK, TAKE CARE OF THINGS AT HOME, OR GET ALONG WITH OTHER PEOPLE: 1
SUM OF ALL RESPONSES TO PHQ QUESTIONS 1-9: 9
6. FEELING BAD ABOUT YOURSELF - OR THAT YOU ARE A FAILURE OR HAVE LET YOURSELF OR YOUR FAMILY DOWN: 0
SUM OF ALL RESPONSES TO PHQ QUESTIONS 1-9: 9

## 2023-08-28 NOTE — PROGRESS NOTES
Subjective:   Patient ID: Francisco Blunt is a 35 y.o. female. HPI by clinical support staff:   Chief Complaint   Patient presents with    Depression     F/u        Preliminary data above this line collected by clinical support staff.    ______________________________________________________________________  HPI by Provider:   HPI   Struggling to balance work and home. A lot of changes with family structure- used to be on top of deadlines now barely meeting it. Worries about son at - worries she may forget something or make a mistake raising him. Seeing psychologist.    Data above this line collected by Provider. Patient's medications, allergies, past medical, surgical, social and family histories were reviewed and updated as appropriate. Patient Care Team:  Brook Mccurdy MD as PCP - General (Family Medicine)  Brook Mccurdy MD as PCP - [facility]  Current Outpatient Medications on File Prior to Visit   Medication Sig Dispense Refill    sertraline (ZOLOFT) 50 MG tablet Take 1 tablet by mouth daily 30 tablet 0    norethindrone (MICRONOR) 0.35 MG tablet Take 1 tablet by mouth daily      Prenatal MV-Min-Fe Fum-FA-DHA (PRENATAL 1 PO) Take by mouth      azelastine (ASTELIN) 0.1 % nasal spray 137 sprays by Nasal route daily      fluticasone (FLONASE) 50 MCG/ACT nasal spray 2 sprays by Nasal route       No current facility-administered medications on file prior to visit. Review of Systems   Constitutional:  Negative for activity change, appetite change, fatigue and fever. HENT:  Negative for congestion, rhinorrhea and sore throat. Respiratory:  Negative for chest tightness and shortness of breath. Cardiovascular:  Negative for chest pain, palpitations and leg swelling. Gastrointestinal:  Negative for abdominal pain, constipation and diarrhea. Genitourinary:  Negative for dysuria and frequency. Musculoskeletal:  Negative for arthralgias.    Neurological:  Negative for dizziness,

## 2023-08-31 ASSESSMENT — ENCOUNTER SYMPTOMS
CHEST TIGHTNESS: 0
DIARRHEA: 0
RHINORRHEA: 0
ABDOMINAL PAIN: 0
CONSTIPATION: 0
SHORTNESS OF BREATH: 0
SORE THROAT: 0

## 2023-08-31 NOTE — PROGRESS NOTES
Behavioral Health Consultation  Key Israel Psy.D. Psychologist  9/5/2023      Time spent with Patient: 25 minutes  This is patient's ninth [facility] appointment. Reason for Consult:    Chief Complaint   Patient presents with    Depression     Referring Provider: Ike Mortimer, MD  [address] [facility],  [facility] [address]    Feedback given to PCP. TELEHEALTH VISIT -- [address], was evaluated through a synchronous (real-time) audio-video encounter. The patient (or guardian if applicable) is aware that this is a billable service, which includes applicable co-pays. This Virtual Visit was conducted with patient's (and/or legal guardian's) consent. Patient identification was verified, and a caregiver was present when appropriate. The patient was located in a state where the provider was licensed to provide care. Conducted a risk-benefit analysis and determined that the patient's presenting problems are consistent with the use of telepsychology. Determined that the patient and/or guardian has sufficient knowledge and skills in the use of technology enabling them to adequately benefit from telepsychology. It was determined that this patient was able to be properly treated without an in-person session. Patient or guardian verified that they were currently located at the [place] address that was provided during registration. Discussed consent form for telehealth and patient or guardian provided verbal consent.     Verified the following information:  Patient's identification: Yes  Patient location: [address] [place] [address].   Patient's call back number: [phone]   Patient's emergency contact's name and number, as well as permission to contact them if needed: Extended Emergency Contact Information  Primary Emergency Contact: Allan Taylor  Address: [address] [facility], [address] [facility] of [address] [place] Phone: [phone]  Mobile Phone: [phone]  Relation:

## 2023-09-05 ENCOUNTER — TELEMEDICINE (OUTPATIENT)
Dept: PSYCHOLOGY | Age: 33
End: 2023-09-05
Payer: COMMERCIAL

## 2023-09-05 DIAGNOSIS — F41.9 ANXIETY DISORDER, UNSPECIFIED TYPE: ICD-10-CM

## 2023-09-05 PROCEDURE — 90832 PSYTX W PT 30 MINUTES: CPT | Performed by: PSYCHOLOGIST

## 2023-09-08 DIAGNOSIS — F41.9 ANXIETY: ICD-10-CM

## 2023-09-08 NOTE — TELEPHONE ENCOUNTER
Medication:   Requested Prescriptions     Pending Prescriptions Disp Refills    sertraline (ZOLOFT) 50 MG tablet [Pharmacy Med Name: SERTRALINE 50MG TABLETS] 30 tablet 0     Sig: TAKE 1 TABLET BY MOUTH DAILY        Last Filled:  7/31/2023 #30 0 refills    Patient Phone Number: [phone] (home)     Last appt: 8/28/2023   Next appt: Visit date not found    Last OARRS: No flowsheet data found.

## 2023-09-20 ENCOUNTER — TELEMEDICINE (OUTPATIENT)
Dept: PSYCHOLOGY | Age: 33
End: 2023-09-20
Payer: COMMERCIAL

## 2023-09-20 DIAGNOSIS — F41.9 ANXIETY DISORDER, UNSPECIFIED TYPE: ICD-10-CM

## 2023-09-20 PROCEDURE — 90832 PSYTX W PT 30 MINUTES: CPT | Performed by: PSYCHOLOGIST

## 2023-09-20 NOTE — PROGRESS NOTES
Behavioral Health Consultation  Crow De Guzman Psy.D. Psychologist  9/20/2023      Time spent with Patient: 25 minutes  This is patient's tenth [facility] appointment. Reason for Consult:    Chief Complaint   Patient presents with    Anxiety    Depression     Referring Provider: Abbi Shannon MD  [address] [facility],  [address]    Feedback given to PCP. TELEHEALTH VISIT -- [address] [place] [address], was evaluated through a synchronous (real-time) audio-video encounter. The patient (or guardian if applicable) is aware that this is a billable service, which includes applicable co-pays. This Virtual Visit was conducted with patient's (and/or legal guardian's) consent. Patient identification was verified, and a caregiver was present when appropriate. The patient was located in a state where the provider was licensed to provide care. Conducted a risk-benefit analysis and determined that the patient's presenting problems are consistent with the use of telepsychology. Determined that the patient and/or guardian has sufficient knowledge and skills in the use of technology enabling them to adequately benefit from telepsychology. It was determined that this patient was able to be properly treated without an in-person session. Patient or guardian verified that they were currently located at the [place] address that was provided during registration. Discussed consent form for telehealth and patient or guardian provided verbal consent.     Verified the following information:  Patient's identification: Yes  Patient location: [address] [place] [address].   Patient's call back number: [phone]   Patient's emergency contact's name and number, as well as permission to contact them if needed: Extended Emergency Contact Information  Primary Emergency Contact: Malcolm Fishman  Address: [address] [facility], [address] [facility] of [address] [place] Phone: [phone]  Mobile Phone:

## 2023-10-02 ENCOUNTER — TELEMEDICINE (OUTPATIENT)
Dept: PSYCHOLOGY | Age: 33
End: 2023-10-02
Payer: COMMERCIAL

## 2023-10-02 DIAGNOSIS — F41.9 ANXIETY DISORDER, UNSPECIFIED TYPE: ICD-10-CM

## 2023-10-02 PROCEDURE — 90832 PSYTX W PT 30 MINUTES: CPT | Performed by: PSYCHOLOGIST

## 2023-10-02 NOTE — PROGRESS NOTES
Behavioral Health Consultation  Alie Vicente Psy.D. Psychologist  10/2/2023      Time spent with Patient: 25 minutes  This is patient's  11th  [facility] appointment. Reason for Consult:    Chief Complaint   Patient presents with    Anxiety    Depression     Referring Provider: Stephani Edwards MD  [address] [facility],  [address]    Feedback given to PCP. TELEHEALTH VISIT -- [address], was evaluated through a synchronous (real-time) audio-video encounter. The patient (or guardian if applicable) is aware that this is a billable service, which includes applicable co-pays. This Virtual Visit was conducted with patient's (and/or legal guardian's) consent. Patient identification was verified, and a caregiver was present when appropriate. The patient was located in a state where the provider was licensed to provide care. Conducted a risk-benefit analysis and determined that the patient's presenting problems are consistent with the use of telepsychology. Determined that the patient and/or guardian has sufficient knowledge and skills in the use of technology enabling them to adequately benefit from telepsychology. It was determined that this patient was able to be properly treated without an in-person session. Patient or guardian verified that they were currently located at the [place] address that was provided during registration. Discussed consent form for telehealth and patient or guardian provided verbal consent.     Verified the following information:  Patient's identification: Yes  Patient location: [facility].   Patient's call back number: [phone]   Patient's emergency contact's name and number, as well as permission to contact them if needed: Extended Emergency Contact Information  Primary Emergency Contact: Anya Grant  Address: [address] [facility], [address] [facility] of [address] [place] Phone: [phone]  Mobile Phone:

## 2023-10-12 ENCOUNTER — OFFICE VISIT (OUTPATIENT)
Dept: PRIMARY CARE CLINIC | Age: 33
End: 2023-10-12
Payer: COMMERCIAL

## 2023-10-12 VITALS
DIASTOLIC BLOOD PRESSURE: 68 MMHG | HEIGHT: 67 IN | SYSTOLIC BLOOD PRESSURE: 128 MMHG | WEIGHT: 221 LBS | HEART RATE: 94 BPM | OXYGEN SATURATION: 97 % | BODY MASS INDEX: 34.69 KG/M2

## 2023-10-12 DIAGNOSIS — L24.89 IRRITANT CONTACT DERMATITIS DUE TO OTHER AGENTS: ICD-10-CM

## 2023-10-12 DIAGNOSIS — F41.9 ANXIETY: ICD-10-CM

## 2023-10-12 PROCEDURE — 1036F TOBACCO NON-USER: CPT | Performed by: FAMILY MEDICINE

## 2023-10-12 PROCEDURE — G8417 CALC BMI ABV UP PARAM F/U: HCPCS | Performed by: FAMILY MEDICINE

## 2023-10-12 PROCEDURE — G8427 DOCREV CUR MEDS BY ELIG CLIN: HCPCS | Performed by: FAMILY MEDICINE

## 2023-10-12 PROCEDURE — 99214 OFFICE O/P EST MOD 30 MIN: CPT | Performed by: FAMILY MEDICINE

## 2023-10-12 PROCEDURE — G8484 FLU IMMUNIZE NO ADMIN: HCPCS | Performed by: FAMILY MEDICINE

## 2023-10-12 RX ORDER — TRIAMCINOLONE ACETONIDE 1 MG/G
OINTMENT TOPICAL 2 TIMES DAILY
Qty: 160 G | Refills: 1 | Status: SHIPPED | OUTPATIENT
Start: 2023-10-12 | End: 2023-10-19

## 2023-10-12 NOTE — PROGRESS NOTES
Subjective:   Patient ID: Francisco Blunt is a 35 y.o. female. HPI by clinical support staff:   Chief Complaint   Patient presents with    Postpartum Care      Preliminary data above this line collected by clinical support staff.    ______________________________________________________________________  HPI by Provider:   HPI   Anxiety and depression improved. Changes to routine hasn't helped. 's football season ending so he will be helping more soon. Doing doing well today  due to the type of case she had in court today  but otherwise well. Sleeping better. Baby still waking up to feed at night. Vyvanse didn't help with focus- feels her milk is drying out. Rash under bra strap- itches. Data above this line collected by Provider. Patient's medications, allergies, past medical, surgical, social and family histories were reviewed and updated as appropriate. Patient Care Team:  Brook Mccurdy MD as PCP - General (Family Medicine)  Brook Mccurdy MD as PCP - Empaneled Provider  Current Outpatient Medications on File Prior to Visit   Medication Sig Dispense Refill    sertraline (ZOLOFT) 50 MG tablet TAKE 1 TABLET BY MOUTH DAILY 90 tablet 1    norethindrone (MICRONOR) 0.35 MG tablet Take 1 tablet by mouth daily      Prenatal MV-Min-Fe Fum-FA-DHA (PRENATAL 1 PO) Take by mouth      azelastine (ASTELIN) 0.1 % nasal spray 137 sprays by Nasal route daily      fluticasone (FLONASE) 50 MCG/ACT nasal spray 2 sprays by Nasal route       No current facility-administered medications on file prior to visit. Review of Systems   Constitutional:  Negative for activity change, appetite change, fatigue and fever. HENT:  Negative for congestion, rhinorrhea and sore throat. Respiratory:  Negative for chest tightness and shortness of breath. Cardiovascular:  Negative for chest pain, palpitations and leg swelling. Gastrointestinal:  Negative for abdominal pain, constipation and diarrhea.    Genitourinary:

## 2023-10-13 NOTE — PROGRESS NOTES
Behavioral Health Consultation  Alec Olivo Psy.D. Psychologist  10/16/2023      Time spent with Patient: 25 minutes  This is patient's  12th  [facility] appointment. Reason for Consult:    Chief Complaint   Patient presents with    Anxiety    Depression     Referring Provider: MD Tom Burdick,  [address]    Feedback given to PCP. TELEHEALTH VISIT -- [address] [place] [address], was evaluated through a synchronous (real-time) audio-video encounter. The patient (or guardian if applicable) is aware that this is a billable service, which includes applicable co-pays. This Virtual Visit was conducted with patient's (and/or legal guardian's) consent. Patient identification was verified, and a caregiver was present when appropriate. The patient was located in a state where the provider was licensed to provide care. Conducted a risk-benefit analysis and determined that the patient's presenting problems are consistent with the use of telepsychology. Determined that the patient and/or guardian has sufficient knowledge and skills in the use of technology enabling them to adequately benefit from telepsychology. It was determined that this patient was able to be properly treated without an in-person session. Patient or guardian verified that they were currently located at the [place] address that was provided during registration. Discussed consent form for telehealth and patient or guardian provided verbal consent.     Verified the following information:  Patient's identification: Yes  Patient location: [address] [place] [address].   Patient's call back number: [phone]   Patient's emergency contact's name and number, as well as permission to contact them if needed: Extended Emergency Contact Information  Primary Emergency Contact: Linda Juntura  Address: [address] [facility], [address] [facility] of [address] [place] Phone: [phone]  Mobile Phone:

## 2023-10-16 ENCOUNTER — TELEMEDICINE (OUTPATIENT)
Dept: PSYCHOLOGY | Age: 33
End: 2023-10-16
Payer: COMMERCIAL

## 2023-10-16 DIAGNOSIS — F41.9 ANXIETY DISORDER, UNSPECIFIED TYPE: ICD-10-CM

## 2023-10-16 PROCEDURE — 90832 PSYTX W PT 30 MINUTES: CPT | Performed by: PSYCHOLOGIST

## 2023-10-16 ASSESSMENT — ENCOUNTER SYMPTOMS
SORE THROAT: 0
CONSTIPATION: 0
ABDOMINAL PAIN: 0
RHINORRHEA: 0
SHORTNESS OF BREATH: 0
DIARRHEA: 0
CHEST TIGHTNESS: 0

## 2023-11-06 ENCOUNTER — TELEMEDICINE (OUTPATIENT)
Dept: PSYCHOLOGY | Age: 33
End: 2023-11-06
Payer: COMMERCIAL

## 2023-11-06 DIAGNOSIS — F41.9 ANXIETY DISORDER, UNSPECIFIED TYPE: ICD-10-CM

## 2023-11-06 PROCEDURE — 90832 PSYTX W PT 30 MINUTES: CPT | Performed by: PSYCHOLOGIST

## 2023-11-06 NOTE — PROGRESS NOTES
Behavioral Health Consultation  Karan Childers Psy.D. Psychologist  11/6/2023      Time spent with Patient: 25 minutes  This is patient's  13th  [facility] appointment. Reason for Consult:    Chief Complaint   Patient presents with    Anxiety    Depression     Referring Provider: MD Tom Joseph,  [address]    Feedback given to PCP. TELEHEALTH VISIT -- [address] [place] [address], was evaluated through a synchronous (real-time) audio-video encounter. The patient (or guardian if applicable) is aware that this is a billable service, which includes applicable co-pays. This Virtual Visit was conducted with patient's (and/or legal guardian's) consent. Patient identification was verified, and a caregiver was present when appropriate. The patient was located in a state where the provider was licensed to provide care. Conducted a risk-benefit analysis and determined that the patient's presenting problems are consistent with the use of telepsychology. Determined that the patient and/or guardian has sufficient knowledge and skills in the use of technology enabling them to adequately benefit from telepsychology. It was determined that this patient was able to be properly treated without an in-person session. Patient or guardian verified that they were currently located at the [place] address that was provided during registration. Discussed consent form for telehealth and patient or guardian provided verbal consent.     Verified the following information:  Patient's identification: Yes  Patient location: [address] [place] [address] [address] [facility] [address] [address]   Patient's call back number: [phone]   Patient's emergency contact's name and number, as well as permission to contact them if needed: Extended Emergency Contact Information  Primary Emergency Contact: Massimo Sheikh  Address: [address] [facility], [address] [facility] of [address] [facility] Phone:

## 2023-11-27 ENCOUNTER — OFFICE VISIT (OUTPATIENT)
Dept: PRIMARY CARE CLINIC | Age: 33
End: 2023-11-27
Payer: COMMERCIAL

## 2023-11-27 ENCOUNTER — OFFICE VISIT (OUTPATIENT)
Dept: PSYCHOLOGY | Age: 33
End: 2023-11-27
Payer: COMMERCIAL

## 2023-11-27 VITALS
BODY MASS INDEX: 34.31 KG/M2 | HEIGHT: 67 IN | DIASTOLIC BLOOD PRESSURE: 89 MMHG | SYSTOLIC BLOOD PRESSURE: 135 MMHG | OXYGEN SATURATION: 98 % | WEIGHT: 218.6 LBS | HEART RATE: 94 BPM | RESPIRATION RATE: 16 BRPM

## 2023-11-27 DIAGNOSIS — F41.9 ANXIETY: Primary | ICD-10-CM

## 2023-11-27 DIAGNOSIS — F41.9 ANXIETY DISORDER, UNSPECIFIED TYPE: ICD-10-CM

## 2023-11-27 PROCEDURE — 90832 PSYTX W PT 30 MINUTES: CPT | Performed by: PSYCHOLOGIST

## 2023-11-27 PROCEDURE — 1036F TOBACCO NON-USER: CPT | Performed by: PSYCHOLOGIST

## 2023-11-27 PROCEDURE — G8427 DOCREV CUR MEDS BY ELIG CLIN: HCPCS | Performed by: FAMILY MEDICINE

## 2023-11-27 PROCEDURE — 99214 OFFICE O/P EST MOD 30 MIN: CPT | Performed by: FAMILY MEDICINE

## 2023-11-27 PROCEDURE — G8484 FLU IMMUNIZE NO ADMIN: HCPCS | Performed by: FAMILY MEDICINE

## 2023-11-27 PROCEDURE — 1036F TOBACCO NON-USER: CPT | Performed by: FAMILY MEDICINE

## 2023-11-27 PROCEDURE — G8417 CALC BMI ABV UP PARAM F/U: HCPCS | Performed by: FAMILY MEDICINE

## 2023-11-27 ASSESSMENT — PATIENT HEALTH QUESTIONNAIRE - PHQ9
7. TROUBLE CONCENTRATING ON THINGS, SUCH AS READING THE NEWSPAPER OR WATCHING TELEVISION: 1
5. POOR APPETITE OR OVEREATING: 2
SUM OF ALL RESPONSES TO PHQ QUESTIONS 1-9: 7
6. FEELING BAD ABOUT YOURSELF - OR THAT YOU ARE A FAILURE OR HAVE LET YOURSELF OR YOUR FAMILY DOWN: 0
4. FEELING TIRED OR HAVING LITTLE ENERGY: 2
2. FEELING DOWN, DEPRESSED OR HOPELESS: 0
SUM OF ALL RESPONSES TO PHQ QUESTIONS 1-9: 7
SUM OF ALL RESPONSES TO PHQ9 QUESTIONS 1 & 2: 0
3. TROUBLE FALLING OR STAYING ASLEEP: 1
9. THOUGHTS THAT YOU WOULD BE BETTER OFF DEAD, OR OF HURTING YOURSELF: 0
8. MOVING OR SPEAKING SO SLOWLY THAT OTHER PEOPLE COULD HAVE NOTICED. OR THE OPPOSITE, BEING SO FIGETY OR RESTLESS THAT YOU HAVE BEEN MOVING AROUND A LOT MORE THAN USUAL: 1
1. LITTLE INTEREST OR PLEASURE IN DOING THINGS: 0
SUM OF ALL RESPONSES TO PHQ QUESTIONS 1-9: 7
SUM OF ALL RESPONSES TO PHQ QUESTIONS 1-9: 7

## 2023-11-27 ASSESSMENT — ENCOUNTER SYMPTOMS
DIARRHEA: 0
CONSTIPATION: 0
CHEST TIGHTNESS: 0
SORE THROAT: 0
RHINORRHEA: 0
ABDOMINAL PAIN: 0
SHORTNESS OF BREATH: 0

## 2023-11-27 NOTE — PROGRESS NOTES
Subjective:   Patient ID: Leanna Samano is a 35 y.o. female. HPI by clinical support staff:   Chief Complaint   Patient presents with    Follow-up     Follow up      Preliminary data above this line collected by clinical support staff.    ______________________________________________________________________  HPI by Provider:   HPI   Presents for follow up  on anxiety and depression. Improved but will be traveling for work soon and is anxious about that- known baby will be fine but worries. Applying for new role at work and has been taking on 2 positions so has been overwhelmed. Data above this line collected by Provider. Patient's medications, allergies, past medical, surgical, social and family histories were reviewed and updated as appropriate. Patient Care Team:  Kash Paredes MD as PCP - General (Family Medicine)  Kash Paredes MD as PCP - [facility]  Current Outpatient Medications on File Prior to Visit   Medication Sig Dispense Refill    sertraline (ZOLOFT) 50 MG tablet TAKE 1 TABLET BY MOUTH DAILY 90 tablet 1    norethindrone (MICRONOR) 0.35 MG tablet Take 1 tablet by mouth daily      Prenatal MV-Min-Fe Fum-FA-DHA (PRENATAL 1 PO) Take by mouth      azelastine (ASTELIN) 0.1 % nasal spray 137 sprays by Nasal route daily      fluticasone (FLONASE) 50 MCG/ACT nasal spray 2 sprays by Nasal route       No current facility-administered medications on file prior to visit. Review of Systems   Constitutional:  Negative for activity change, appetite change, fatigue and fever. HENT:  Negative for congestion, rhinorrhea and sore throat. Respiratory:  Negative for chest tightness and shortness of breath. Cardiovascular:  Negative for chest pain, palpitations and leg swelling. Gastrointestinal:  Negative for abdominal pain, constipation and diarrhea. Genitourinary:  Negative for dysuria and frequency. Musculoskeletal:  Negative for arthralgias.    Neurological:  Negative for

## 2023-11-27 NOTE — PROGRESS NOTES
Behavioral Health Consultation  Brianda Aponte Psy.D. Psychologist  11/27/2023        Time spent with client or family: 25 minutes  This is patient's  14th   [facility] appointment. Reason for Consult:    Chief Complaint   Patient presents with    Anxiety    Depression     Referring Provider: MD Tom Cohen,  [address]    Feedback given to PCP. S:  Amish Doss reports that she is doing well. She has noticed significant improvement in her 's involvement in the home and she is feeling much more connected to him as a result. Amish Doss will be going on a work trip this week and is not looking forward to the sadness of being away from her son, but feels prepared for it. She is trying to focus on taking time to herself while on the trip. Amish Doss feels she is doing better with setting boundaries at work that support balance in her life. Amish Doss is concerned about her friend who may be experiencing some symptoms of PMADs. O:  MSE:  Appearance    alert, cooperative  Appetite normal  Sleep disturbance Yes  Fatigue Yes  Loss of pleasure No  Impulsive behavior No  Speech    normal rate and normal volume  Mood    euthymic   Affect    normal affect  Thought Content    intact  Thought Process    linear  Associations    logical connections  Insight    Good  Judgment    Intact  Orientation    oriented to person, place, time, and general circumstances  Memory    recent and remote memory intact  Attention/Concentration    intact  Morbid ideation No  Suicide Assessment    no suicidal ideation    A:  Amish Doss presents for a follow up [facility] appointment regarding concerns related to depression and anxiety. These symptoms are are improving. Safety concerns reported include: none. Diagnosis:  1. Postpartum depression    2. Anxiety disorder, unspecified type        Plan:  Pt interventions:  Reviewed progress and provided praise for effective coping.  Discussed strategies for managing sadness during

## 2023-12-14 DIAGNOSIS — F41.9 ANXIETY: ICD-10-CM

## 2024-01-04 ENCOUNTER — OFFICE VISIT (OUTPATIENT)
Dept: PSYCHOLOGY | Age: 34
End: 2024-01-04
Payer: COMMERCIAL

## 2024-01-04 DIAGNOSIS — F41.9 ANXIETY DISORDER, UNSPECIFIED TYPE: ICD-10-CM

## 2024-01-04 PROCEDURE — 90832 PSYTX W PT 30 MINUTES: CPT | Performed by: PSYCHOLOGIST

## 2024-01-04 PROCEDURE — 1036F TOBACCO NON-USER: CPT | Performed by: PSYCHOLOGIST

## 2024-01-04 NOTE — PROGRESS NOTES
regarding separation from baby using CBT strategies with good impact.    Pt Behavioral Change Plan:   See above

## 2024-01-19 NOTE — PROGRESS NOTES
Behavioral Health Consultation  Victoria Chen Psy.D.  Psychologist  1/22/2024      Time spent with Patient: 25 minutes  This is patient's  17th  [facility] appointment.    Reason for Consult:    Chief Complaint   Patient presents with    Depression    Anxiety     Referring Provider: Judy Noel MD  [address] [facility] [place] [address]    Feedback given to referring provider.    TELEHEALTH VISIT -- Audio/Visual  Anthony ELLEN Bradford, was evaluated through a synchronous (real-time) audio-video encounter. The patient (or guardian if applicable) is aware that this is a billable service, which includes applicable co-pays. This Virtual Visit was conducted with patient's (and/or legal guardian's) consent.  Patient identification was verified, and a caregiver was present when appropriate. The patient was located in a state where the provider was licensed to provide care.     Conducted a risk-benefit analysis and determined that the patient's presenting problems are consistent with the use of telepsychology. Determined that the patient and/or guardian has sufficient knowledge and skills in the use of technology enabling them to adequately benefit from telepsychology. It was determined that this patient was able to be properly treated without an in-person session. Patient or guardian verified that they were currently located at the [place] address that was provided during registration. Discussed consent form for telehealth and patient or guardian provided verbal consent.    Verified the following information:  Patient's identification: Yes  Patient location: [address] [place] [address]  Patient's call back number: [phone]   Patient's emergency contact's name and number, as well as permission to contact them if needed: Extended Emergency Contact Information  Primary Emergency Contact: Sourav Bradford  Address: [address] [place] [address] [facility]  Home Phone: [phone]  Mobile

## 2024-01-22 ENCOUNTER — TELEMEDICINE (OUTPATIENT)
Dept: PSYCHOLOGY | Age: 34
End: 2024-01-22
Payer: COMMERCIAL

## 2024-01-22 DIAGNOSIS — F41.9 ANXIETY DISORDER, UNSPECIFIED TYPE: ICD-10-CM

## 2024-01-22 PROCEDURE — 90832 PSYTX W PT 30 MINUTES: CPT | Performed by: PSYCHOLOGIST

## 2024-01-29 ENCOUNTER — TELEPHONE (OUTPATIENT)
Dept: PRIMARY CARE CLINIC | Age: 34
End: 2024-01-29

## 2024-01-29 ENCOUNTER — TELEMEDICINE (OUTPATIENT)
Dept: PRIMARY CARE CLINIC | Age: 34
End: 2024-01-29
Payer: COMMERCIAL

## 2024-01-29 DIAGNOSIS — A08.4 VIRAL GASTROENTERITIS: Primary | ICD-10-CM

## 2024-01-29 PROCEDURE — G8417 CALC BMI ABV UP PARAM F/U: HCPCS | Performed by: FAMILY MEDICINE

## 2024-01-29 PROCEDURE — 1036F TOBACCO NON-USER: CPT | Performed by: FAMILY MEDICINE

## 2024-01-29 PROCEDURE — G8484 FLU IMMUNIZE NO ADMIN: HCPCS | Performed by: FAMILY MEDICINE

## 2024-01-29 PROCEDURE — 99214 OFFICE O/P EST MOD 30 MIN: CPT | Performed by: FAMILY MEDICINE

## 2024-01-29 PROCEDURE — G8427 DOCREV CUR MEDS BY ELIG CLIN: HCPCS | Performed by: FAMILY MEDICINE

## 2024-01-29 RX ORDER — LOPERAMIDE HYDROCHLORIDE 2 MG/1
2 CAPSULE ORAL 4 TIMES DAILY PRN
Qty: 30 CAPSULE | Refills: 0 | Status: SHIPPED | OUTPATIENT
Start: 2024-01-29

## 2024-01-29 ASSESSMENT — ENCOUNTER SYMPTOMS
DIARRHEA: 1
ABDOMINAL PAIN: 1
CONSTIPATION: 0
CHEST TIGHTNESS: 0
ANAL BLEEDING: 0
BLOOD IN STOOL: 0
VOMITING: 1
NAUSEA: 1
SHORTNESS OF BREATH: 0
SORE THROAT: 0
RHINORRHEA: 0

## 2024-01-29 ASSESSMENT — PATIENT HEALTH QUESTIONNAIRE - PHQ9
SUM OF ALL RESPONSES TO PHQ9 QUESTIONS 1 & 2: 1
SUM OF ALL RESPONSES TO PHQ QUESTIONS 1-9: 7
9. THOUGHTS THAT YOU WOULD BE BETTER OFF DEAD, OR OF HURTING YOURSELF: 0
2. FEELING DOWN, DEPRESSED OR HOPELESS: 1
4. FEELING TIRED OR HAVING LITTLE ENERGY: 2
5. POOR APPETITE OR OVEREATING: 0
8. MOVING OR SPEAKING SO SLOWLY THAT OTHER PEOPLE COULD HAVE NOTICED. OR THE OPPOSITE, BEING SO FIGETY OR RESTLESS THAT YOU HAVE BEEN MOVING AROUND A LOT MORE THAN USUAL: 1
SUM OF ALL RESPONSES TO PHQ QUESTIONS 1-9: 7
SUM OF ALL RESPONSES TO PHQ QUESTIONS 1-9: 7
10. IF YOU CHECKED OFF ANY PROBLEMS, HOW DIFFICULT HAVE THESE PROBLEMS MADE IT FOR YOU TO DO YOUR WORK, TAKE CARE OF THINGS AT HOME, OR GET ALONG WITH OTHER PEOPLE: 1
3. TROUBLE FALLING OR STAYING ASLEEP: 1
SUM OF ALL RESPONSES TO PHQ QUESTIONS 1-9: 7
6. FEELING BAD ABOUT YOURSELF - OR THAT YOU ARE A FAILURE OR HAVE LET YOURSELF OR YOUR FAMILY DOWN: 1
1. LITTLE INTEREST OR PLEASURE IN DOING THINGS: 0

## 2024-01-29 NOTE — TELEPHONE ENCOUNTER
Pt believes she is experiencing food poisoning.  Pt has symptoms of vomiting and diarrhea since last night.    Pt is breast feeding and would like to know what she can take for relief.    Please call back and advise.

## 2024-01-29 NOTE — PROGRESS NOTES
Subjective:   Patient ID: Anthony Bradford is a 34 y.o. female.  HPI by clinical support staff:   Chief Complaint   Patient presents with    Nausea & Vomiting     Started at 3am    Diarrhea     Started at 3am        Preliminary data above this line collected by clinical support staff.    ______________________________________________________________________  HPI by Provider:   HPI   Patient presents virtually with complaint of diarrhea and vomiting since 3 am this morning. Has had 5 watery non bilious non blood stools since then as well as vomiting. Did eat out 2 nights ago and mother had vomiting on same day but resolved. Had pizza and carrot cake for dinner last night but no one has been sick in her household since.  Denies fever, myalgia- only has significant abdominal pain when she has to void.   Tried Tums, ginger ale without relief.   Data above this line collected by Provider.    Patient's medications, allergies, past medical, surgical, social and family histories were reviewed and updated as appropriate.  Patient Care Team:  Judy Noel MD as PCP - General (Family Medicine)  Judy Noel MD as PCP [facility]  Current Outpatient Medications on File Prior to Visit   Medication Sig Dispense Refill    sertraline (ZOLOFT) 50 MG tablet TAKE 1 TABLET BY MOUTH DAILY 90 tablet 1    norethindrone (MICRONOR) 0.35 MG tablet Take 1 tablet by mouth daily      Prenatal MV-Min-Fe Fum-FA-DHA (PRENATAL 1 PO) Take by mouth      fluticasone (FLONASE) 50 MCG/ACT nasal spray 2 sprays by Nasal route       No current facility-administered medications on file prior to visit.     Review of Systems   Constitutional:  Negative for activity change, appetite change, fatigue and fever.   HENT:  Negative for congestion, rhinorrhea and sore throat.    Respiratory:  Negative for chest tightness and shortness of breath.    Cardiovascular:  Negative for chest pain, palpitations and leg swelling.   Gastrointestinal:

## 2024-02-05 ENCOUNTER — TELEMEDICINE (OUTPATIENT)
Dept: PSYCHOLOGY | Age: 34
End: 2024-02-05
Payer: COMMERCIAL

## 2024-02-05 DIAGNOSIS — F41.9 ANXIETY DISORDER, UNSPECIFIED TYPE: ICD-10-CM

## 2024-02-05 PROCEDURE — 90832 PSYTX W PT 30 MINUTES: CPT | Performed by: PSYCHOLOGIST

## 2024-02-05 NOTE — PROGRESS NOTES
Behavioral Health Consultation  Victoria Chen Psy.D.  Psychologist  2/5/2024      Time spent with Patient: 25 minutes  This is patient's  18th  [facility] appointment.    Reason for Consult:    Chief Complaint   Patient presents with    Anxiety    Depression     Referring Provider: Judy Noel MD  [address] [facility]  [address]  [place] [address]    Feedback given to referring provider.    TELEHEALTH VISIT -- Audio/Visual  Anthony ELLEN Bradford, was evaluated through a synchronous (real-time) audio-video encounter. The patient (or guardian if applicable) is aware that this is a billable service, which includes applicable co-pays. This Virtual Visit was conducted with patient's (and/or legal guardian's) consent.  Patient identification was verified, and a caregiver was present when appropriate. The patient was located in a state where the provider was licensed to provide care.     Conducted a risk-benefit analysis and determined that the patient's presenting problems are consistent with the use of telepsychology. Determined that the patient and/or guardian has sufficient knowledge and skills in the use of technology enabling them to adequately benefit from telepsychology. It was determined that this patient was able to be properly treated without an in-person session. Patient or guardian verified that they were currently located at the [place] address that was provided during registration. Discussed consent form for telehealth and patient or guardian provided verbal consent.    Verified the following information:  Patient's identification: Yes  Patient location: [address] [place] [address]   Patient's call back number: [phone]   Patient's emergency contact's name and number, as well as permission to contact them if needed: Extended Emergency Contact Information  Primary Emergency Contact: Sourav Bradford  Address: [address] [place] [address] [facility]  Home Phone: [phone]  Mobile

## 2024-02-20 ENCOUNTER — TELEMEDICINE (OUTPATIENT)
Dept: PSYCHOLOGY | Age: 34
End: 2024-02-20
Payer: COMMERCIAL

## 2024-02-20 DIAGNOSIS — F41.9 ANXIETY DISORDER, UNSPECIFIED TYPE: ICD-10-CM

## 2024-02-20 PROCEDURE — 90832 PSYTX W PT 30 MINUTES: CPT | Performed by: PSYCHOLOGIST

## 2024-02-20 NOTE — PROGRESS NOTES
Behavioral Health Consultation  Victoria Chen Psy.D.  Psychologist  2/20/2024      Time spent with Patient: 25 minutes  This is patient's  19th  [facility] appointment.    Reason for Consult:    Chief Complaint   Patient presents with    Depression    Anxiety     Referring Provider: Judy Noel MD  [address] [facility]  [address] [place] [address]    Feedback given to referring provider.    TELEHEALTH VISIT -- Audio/Visual  Anthony ELLEN Bradford, was evaluated through a synchronous (real-time) audio-video encounter. The patient (or guardian if applicable) is aware that this is a billable service, which includes applicable co-pays. This Virtual Visit was conducted with patient's (and/or legal guardian's) consent.  Patient identification was verified, and a caregiver was present when appropriate. The patient was located in a state where the provider was licensed to provide care.     Conducted a risk-benefit analysis and determined that the patient's presenting problems are consistent with the use of telepsychology. Determined that the patient and/or guardian has sufficient knowledge and skills in the use of technology enabling them to adequately benefit from telepsychology. It was determined that this patient was able to be properly treated without an in-person session. Patient or guardian verified that they were currently located at the [place] address that was provided during registration. Discussed consent form for telehealth and patient or guardian provided verbal consent.    Verified the following information:  Patient's identification: Yes  Patient location: [address] [place] [address]  Patient's call back number: [phone]   Patient's emergency contact's name and number, as well as permission to contact them if needed: Extended Emergency Contact Information  Primary Emergency Contact: Sourav Bradford  Address: [address] [place] [address] [facility]  Home Phone: [phone]  Mobile

## 2024-02-27 ENCOUNTER — TELEMEDICINE (OUTPATIENT)
Dept: PRIMARY CARE CLINIC | Age: 34
End: 2024-02-27
Payer: COMMERCIAL

## 2024-02-27 DIAGNOSIS — F41.9 ANXIETY: Primary | ICD-10-CM

## 2024-02-27 DIAGNOSIS — F32.A MILD DEPRESSION: ICD-10-CM

## 2024-02-27 PROBLEM — J30.2 SEASONAL ALLERGIC RHINITIS: Status: ACTIVE | Noted: 2023-12-12

## 2024-02-27 PROCEDURE — G8484 FLU IMMUNIZE NO ADMIN: HCPCS | Performed by: FAMILY MEDICINE

## 2024-02-27 PROCEDURE — G8417 CALC BMI ABV UP PARAM F/U: HCPCS | Performed by: FAMILY MEDICINE

## 2024-02-27 PROCEDURE — G8427 DOCREV CUR MEDS BY ELIG CLIN: HCPCS | Performed by: FAMILY MEDICINE

## 2024-02-27 PROCEDURE — 1036F TOBACCO NON-USER: CPT | Performed by: FAMILY MEDICINE

## 2024-02-27 PROCEDURE — 99214 OFFICE O/P EST MOD 30 MIN: CPT | Performed by: FAMILY MEDICINE

## 2024-02-27 RX ORDER — SERTRALINE HYDROCHLORIDE 100 MG/1
100 TABLET, FILM COATED ORAL DAILY
Qty: 30 TABLET | Refills: 2 | Status: SHIPPED | OUTPATIENT
Start: 2024-02-27

## 2024-02-27 NOTE — PROGRESS NOTES
Subjective:   Patient ID: Anthony Bradford is a 34 y.o. female.  HPI by clinical support staff:   Chief Complaint   Patient presents with    Mental Health Problem     Discuss mental health       Preliminary data above this line collected by clinical support staff.    ______________________________________________________________________  HPI by Provider:   HPI   Patient presents for follow up  on anxiety and depression. Feels she is struggling to complete tasks at work. Almost drove her son to work instead of  one morning. Doesn't feel overwhelmed- thinks her  helps at home with meals and taking the baby when needed. No suicidal ideation/homicidal ideation. States medication  compliance.  Son is getting ear tubes soon.   Data above this line collected by Provider.    Patient's medications, allergies, past medical, surgical, social and family histories were reviewed and updated as appropriate.  Patient Care Team:  Judy Noel MD as PCP - General (Family Medicine)  [facility]  Current Outpatient Medications on File Prior to Visit   Medication Sig Dispense Refill    norethindrone (MICRONOR) 0.35 MG tablet Take 1 tablet by mouth daily      Prenatal MV-Min-Fe Fum-FA-DHA (PRENATAL 1 PO) Take by mouth      fluticasone (FLONASE) 50 MCG/ACT nasal spray 2 sprays by Nasal route       No current facility-administered medications on file prior to visit.     Review of Systems   Constitutional:  Negative for activity change, appetite change, fatigue and fever.   HENT:  Negative for congestion, rhinorrhea and sore throat.    Respiratory:  Negative for chest tightness and shortness of breath.    Cardiovascular:  Negative for chest pain, palpitations and leg swelling.   Gastrointestinal:  Negative for abdominal pain, constipation and diarrhea.   Genitourinary:  Negative for dysuria and frequency.   Musculoskeletal:  Negative for arthralgias.   Neurological:  Negative for

## 2024-03-05 ENCOUNTER — TELEMEDICINE (OUTPATIENT)
Dept: PSYCHOLOGY | Age: 34
End: 2024-03-05
Payer: COMMERCIAL

## 2024-03-05 DIAGNOSIS — F41.9 ANXIETY DISORDER, UNSPECIFIED TYPE: ICD-10-CM

## 2024-03-05 PROCEDURE — 90832 PSYTX W PT 30 MINUTES: CPT | Performed by: PSYCHOLOGIST

## 2024-03-05 NOTE — PROGRESS NOTES
Behavioral Health Consultation  Victoria Chen Psy.D.  Psychologist  3/5/2024      Time spent with Patient: 25 minutes  This is patient's  20th  [facility] appointment.    Reason for Consult:    Chief Complaint   Patient presents with    Anxiety    Depression     Referring Provider: Judy Noel MD  [address] [facility] [place] [address]    Feedback given to referring provider.    TELEHEALTH VISIT -- Audio/Visual  Anthony ELLEN Bradford, was evaluated through a synchronous (real-time) audio-video encounter. The patient (or guardian if applicable) is aware that this is a billable service, which includes applicable co-pays. This Virtual Visit was conducted with patient's (and/or legal guardian's) consent.  Patient identification was verified, and a caregiver was present when appropriate. The patient was located in a state where the provider was licensed to provide care.     Conducted a risk-benefit analysis and determined that the patient's presenting problems are consistent with the use of telepsychology. Determined that the patient and/or guardian has sufficient knowledge and skills in the use of technology enabling them to adequately benefit from telepsychology. It was determined that this patient was able to be properly treated without an in-person session. Patient or guardian verified that they were currently located at the [place] address that was provided during registration. Discussed consent form for telehealth and patient or guardian provided verbal consent.    Verified the following information:  Patient's identification: Yes  Patient location: [address] [place] [address]   Patient's call back number: [phone]   Patient's emergency contact's name and number, as well as permission to contact them if needed: Extended Emergency Contact Information  Primary Emergency Contact: Sourav Bradford  Address: [address] [place] [address] [facility]  Home Phone: [phone]  Mobile

## 2024-03-19 ENCOUNTER — TELEMEDICINE (OUTPATIENT)
Dept: PSYCHOLOGY | Age: 34
End: 2024-03-19
Payer: COMMERCIAL

## 2024-03-19 DIAGNOSIS — F41.9 ANXIETY DISORDER, UNSPECIFIED TYPE: ICD-10-CM

## 2024-03-19 PROCEDURE — 90832 PSYTX W PT 30 MINUTES: CPT | Performed by: PSYCHOLOGIST

## 2024-03-19 NOTE — PROGRESS NOTES
Behavioral Health Consultation  Victoria Chen Psy.D.  Psychologist  3/19/2024      Time spent with Patient: 25 minutes  This is patient's  21st  [facility] appointment.    Reason for Consult:    Chief Complaint   Patient presents with    Anxiety    Depression     Referring Provider: Judy Noel MD  [address] [facility] [place] [facility] [place] [address]    Feedback given to referring provider.    TELEHEALTH VISIT -- Audio/Visual  Anthony ELLEN Bradford, was evaluated through a synchronous (real-time) audio-video encounter. The patient (or guardian if applicable) is aware that this is a billable service, which includes applicable co-pays. This Virtual Visit was conducted with patient's (and/or legal guardian's) consent.  Patient identification was verified, and a caregiver was present when appropriate. The patient was located in a state where the provider was licensed to provide care.     Conducted a risk-benefit analysis and determined that the patient's presenting problems are consistent with the use of telepsychology. Determined that the patient and/or guardian has sufficient knowledge and skills in the use of technology enabling them to adequately benefit from telepsychology. It was determined that this patient was able to be properly treated without an in-person session. Patient or guardian verified that they were currently located at the [place] address that was provided during registration. Discussed consent form for telehealth and patient or guardian provided verbal consent.    Verified the following information:  Patient's identification: Yes  Patient location: [address] [place] [address]   Patient's call back number: [phone]   Patient's emergency contact's name and number, as well as permission to contact them if needed: Extended Emergency Contact Information  Primary Emergency Contact: Sourav Bradford  Address: [address] [place] [address] [facility]  Home Phone: [phone]  Mobile

## 2024-04-02 ENCOUNTER — TELEMEDICINE (OUTPATIENT)
Dept: PSYCHOLOGY | Age: 34
End: 2024-04-02
Payer: COMMERCIAL

## 2024-04-02 DIAGNOSIS — F41.1 GENERALIZED ANXIETY DISORDER: ICD-10-CM

## 2024-04-02 DIAGNOSIS — F33.0 MILD EPISODE OF RECURRENT MAJOR DEPRESSIVE DISORDER (HCC): Primary | ICD-10-CM

## 2024-04-02 PROCEDURE — 90832 PSYTX W PT 30 MINUTES: CPT | Performed by: PSYCHOLOGIST

## 2024-04-02 NOTE — PROGRESS NOTES
Behavioral Health Consultation  Victoria Chen Psy.D.  Psychologist  4/2/2024      Time spent with Patient: 25 minutes  This is patient's  22nd  [facility] appointment.    Reason for Consult:    Chief Complaint   Patient presents with    Depression    Anxiety     Referring Provider: Judy Noel MD  [address] [facility] [place] [address]    Feedback given to referring provider.    TELEHEALTH VISIT -- Audio/Visual  Anthony ELLEN Bradford, was evaluated through a synchronous (real-time) audio-video encounter. The patient (or guardian if applicable) is aware that this is a billable service, which includes applicable co-pays. This Virtual Visit was conducted with patient's (and/or legal guardian's) consent.  Patient identification was verified, and a caregiver was present when appropriate. The patient was located in a state where the provider was licensed to provide care.     Conducted a risk-benefit analysis and determined that the patient's presenting problems are consistent with the use of telepsychology. Determined that the patient and/or guardian has sufficient knowledge and skills in the use of technology enabling them to adequately benefit from telepsychology. It was determined that this patient was able to be properly treated without an in-person session. Patient or guardian verified that they were currently located at the [place] address that was provided during registration. Discussed consent form for telehealth and patient or guardian provided verbal consent.    Verified the following information:  Patient's identification: Yes  Patient location: [address] [place] [address]   Patient's call back number: [phone]   Patient's emergency contact's name and number, as well as permission to contact them if needed: Extended Emergency Contact Information  Primary Emergency Contact: Sourav Bradford  Address: [address] [place] [address] [facility]  Home Phone: [phone]  Mobile

## 2024-04-15 ENCOUNTER — TELEMEDICINE (OUTPATIENT)
Dept: PSYCHOLOGY | Age: 34
End: 2024-04-15
Payer: COMMERCIAL

## 2024-04-15 DIAGNOSIS — F41.1 GENERALIZED ANXIETY DISORDER: Primary | ICD-10-CM

## 2024-04-15 DIAGNOSIS — F33.0 MILD EPISODE OF RECURRENT MAJOR DEPRESSIVE DISORDER (HCC): ICD-10-CM

## 2024-04-15 PROCEDURE — 90832 PSYTX W PT 30 MINUTES: CPT | Performed by: PSYCHOLOGIST

## 2024-04-15 NOTE — PROGRESS NOTES
Behavioral Health Consultation  Victoria Chen Psy.D.  Psychologist  4/15/2024      Time spent with Patient: 25 minutes  This is patient's  23rd  [facility] appointment.    Reason for Consult:    Chief Complaint   Patient presents with    Anxiety    Depression     Referring Provider: Judy Noel MD  [address] [facility] [place] [address]    Feedback given to referring provider.    TELEHEALTH VISIT -- Audio/Visual  Anthony ELLEN Bradford, was evaluated through a synchronous (real-time) audio-video encounter. The patient (or guardian if applicable) is aware that this is a billable service, which includes applicable co-pays. This Virtual Visit was conducted with patient's (and/or legal guardian's) consent.  Patient identification was verified, and a caregiver was present when appropriate. The patient was located in a state where the provider was licensed to provide care.     Conducted a risk-benefit analysis and determined that the patient's presenting problems are consistent with the use of telepsychology. Determined that the patient and/or guardian has sufficient knowledge and skills in the use of technology enabling them to adequately benefit from telepsychology. It was determined that this patient was able to be properly treated without an in-person session. Patient or guardian verified that they were currently located at the [place] address that was provided during registration. Discussed consent form for telehealth and patient or guardian provided verbal consent.    Verified the following information:  Patient's identification: Yes  Patient location: [address] [place] [address]   Patient's call back number: [phone]   Patient's emergency contact's name and number, as well as permission to contact them if needed: Extended Emergency Contact Information  Primary Emergency Contact: Sourav Bradford  Address: [address] [place] [address] [facility]  Home Phone: [phone]  Mobile

## 2024-04-29 ENCOUNTER — TELEMEDICINE (OUTPATIENT)
Dept: PSYCHOLOGY | Age: 34
End: 2024-04-29
Payer: COMMERCIAL

## 2024-04-29 DIAGNOSIS — F41.1 GENERALIZED ANXIETY DISORDER: Primary | ICD-10-CM

## 2024-04-29 DIAGNOSIS — F33.0 MILD EPISODE OF RECURRENT MAJOR DEPRESSIVE DISORDER (HCC): ICD-10-CM

## 2024-04-29 PROCEDURE — 90832 PSYTX W PT 30 MINUTES: CPT | Performed by: PSYCHOLOGIST

## 2024-04-29 NOTE — PROGRESS NOTES
Behavioral Health Consultation  Victoria Chen Psy.D.  Psychologist  4/29/2024      Time spent with Patient: 25 minutes  This is patient's  24th  [facility] appointment.    Reason for Consult:    Chief Complaint   Patient presents with    Anxiety    Depression     Referring Provider: Judy Noel MD  [address] [facility] [place] [address]    Feedback given to referring provider.    TELEHEALTH VISIT -- Audio/Visual  Anthony ELLEN Bradford, was evaluated through a synchronous (real-time) audio-video encounter. The patient (or guardian if applicable) is aware that this is a billable service, which includes applicable co-pays. This Virtual Visit was conducted with patient's (and/or legal guardian's) consent.  Patient identification was verified, and a caregiver was present when appropriate. The patient was located in a state where the provider was licensed to provide care.     Conducted a risk-benefit analysis and determined that the patient's presenting problems are consistent with the use of telepsychology. Determined that the patient and/or guardian has sufficient knowledge and skills in the use of technology enabling them to adequately benefit from telepsychology. It was determined that this patient was able to be properly treated without an in-person session. Patient or guardian verified that they were currently located at the [place] address that was provided during registration. Discussed consent form for telehealth and patient or guardian provided verbal consent.    Verified the following information:  Patient's identification: Yes  Patient location: [address] [place] [address]   Patient's call back number: [phone]   Patient's emergency contact's name and number, as well as permission to contact them if needed: Extended Emergency Contact Information  Primary Emergency Contact: Sourav Bradford  Address: [address] [place] [address] [facility]  Home Phone: [phone]  Mobile

## 2024-05-02 LAB — PAP SMEAR, EXTERNAL: NORMAL

## 2024-05-07 DIAGNOSIS — F41.9 ANXIETY: ICD-10-CM

## 2024-05-07 DIAGNOSIS — F32.A MILD DEPRESSION: ICD-10-CM

## 2024-05-07 RX ORDER — SERTRALINE HYDROCHLORIDE 100 MG/1
100 TABLET, FILM COATED ORAL DAILY
Qty: 30 TABLET | Refills: 2 | Status: SHIPPED | OUTPATIENT
Start: 2024-05-07

## 2024-05-07 NOTE — TELEPHONE ENCOUNTER
Medication:   Requested Prescriptions     Pending Prescriptions Disp Refills    sertraline (ZOLOFT) 100 MG tablet [Pharmacy Med Name: SERTRALINE 100MG TABLETS] 30 tablet 2     Sig: TAKE 1 TABLET BY MOUTH DAILY        Last Filled:  2/27/24 #30 2 refills    Patient Phone Number: [phone] (home)     Last appt: 2/27/2024   Next appt: Visit date not found

## 2024-05-13 ENCOUNTER — TELEMEDICINE (OUTPATIENT)
Dept: PSYCHOLOGY | Age: 34
End: 2024-05-13
Payer: COMMERCIAL

## 2024-05-13 DIAGNOSIS — F33.0 MILD EPISODE OF RECURRENT MAJOR DEPRESSIVE DISORDER (HCC): ICD-10-CM

## 2024-05-13 DIAGNOSIS — F41.1 GENERALIZED ANXIETY DISORDER: Primary | ICD-10-CM

## 2024-05-13 PROCEDURE — 90832 PSYTX W PT 30 MINUTES: CPT | Performed by: PSYCHOLOGIST

## 2024-05-13 NOTE — PROGRESS NOTES
Behavioral Health Consultation  Victoria Chen Psy.D.  Psychologist  5/13/2024      Time spent with Patient: 25 minutes  This is patient's  25th  [facility] appointment.    Reason for Consult:    Chief Complaint   Patient presents with    Anxiety    Depression     Referring Provider: Judy Noel MD  [address] [facility]  [place] [address]    Feedback given to referring provider.    TELEHEALTH VISIT -- Audio/Visual  Anthony ELLEN Bradford, was evaluated through a synchronous (real-time) audio-video encounter. The patient (or guardian if applicable) is aware that this is a billable service, which includes applicable co-pays. This Virtual Visit was conducted with patient's (and/or legal guardian's) consent.  Patient identification was verified, and a caregiver was present when appropriate. The patient was located in a state where the provider was licensed to provide care.     Conducted a risk-benefit analysis and determined that the patient's presenting problems are consistent with the use of telepsychology. Determined that the patient and/or guardian has sufficient knowledge and skills in the use of technology enabling them to adequately benefit from telepsychology. It was determined that this patient was able to be properly treated without an in-person session. Patient or guardian verified that they were currently located at the [place] address that was provided during registration. Discussed consent form for telehealth and patient or guardian provided verbal consent.    Verified the following information:  Patient's identification: Yes  Patient location: [address] [place] [address]   Patient's call back number: [phone]   Patient's emergency contact's name and number, as well as permission to contact them if needed: Extended Emergency Contact Information  Primary Emergency Contact: Sourav Bradford  Address: [address] [place] [id number] [facility]  Home Phone: [phone]  Mobile

## 2024-05-30 NOTE — PROGRESS NOTES
[phone]  Relation: Emergency Contact   needed? No  Secondary Emergency Contact: Roxanna Leyva  Home Phone: [phone]  Mobile Phone: [phone]  Relation: Parent   needed? No     Provider location: [place], [place]     S:  Anthony reports that she is feeling ok but is tired. Her son is teething again so he is sleeping poorly. She has pink eye currently. She took the week off last week, enjoyed this greatly. She exercised this past week, which she greatly enjoyed.  She is hoping to continue this as she knows that it will be helpful to her mood. Her plan is to start walking at work, thinks there will be few barriers for this.  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     O:  MSE:  Appearance    alert, cooperative  Appetite normal  Sleep disturbance No  Fatigue No  Loss of pleasure No  Impulsive behavior No  Speech    normal rate and normal volume  Mood    euthymic   Affect    normal affect  Thought Content    intact  Thought Process    linear  Associations    logical connections  Insight    Good  Judgment    Intact  Orientation    oriented to person, place, time, and general circumstances  Memory    recent and remote memory intact  Attention/Concentration    intact  Morbid ideation No  Suicide Assessment    no suicidal ideation    A:  Anthony presents for a follow up [facility] appointment regarding concerns related to depression and anxiety.  These symptoms are are improving.  Safety concerns reported include: none.    Diagnosis:  1. Generalized anxiety disorder    2. Mild episode of recurrent major depressive disorder (HCC)        Plan:  Pt interventions:  Reviewed progress and provided praise for effective coping. 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 Discussed barriers and how she might implement new exercise regiment, continuing to encourage flexible thinking regarding

## 2024-06-03 ENCOUNTER — TELEMEDICINE (OUTPATIENT)
Dept: PSYCHOLOGY | Age: 34
End: 2024-06-03
Payer: COMMERCIAL

## 2024-06-03 DIAGNOSIS — F41.1 GENERALIZED ANXIETY DISORDER: Primary | ICD-10-CM

## 2024-06-03 DIAGNOSIS — F33.0 MILD EPISODE OF RECURRENT MAJOR DEPRESSIVE DISORDER (HCC): ICD-10-CM

## 2024-06-03 PROCEDURE — 90832 PSYTX W PT 30 MINUTES: CPT | Performed by: PSYCHOLOGIST

## 2024-06-05 NOTE — PROGRESS NOTES
meetings of clubs or organizations: Not on file     Relationship status: Not on file    Intimate partner violence:     Fear of current or ex partner: Not on file     Emotionally abused: Not on file     Physically abused: Not on file     Forced sexual activity: Not on file   Other Topics Concern    Not on file   Social History Narrative    Lives with Mandeep Delatorre- July 29, 2019     Engaged- January 30, 2020        Review of Systems:  A comprehensive review of systems was negative except for what was noted in the HPI. Physical Exam:   Vitals:    01/30/20 1435   BP: 120/80   Pulse: 87   SpO2: 98%   Weight: 215 lb (97.5 kg)     Body mass index is 33.67 kg/m². There were no vitals filed for this visit. Physical Exam  Constitutional:       Appearance: She is well-developed. HENT:      Head: Atraumatic. Right Ear: Hearing, tympanic membrane, ear canal and external ear normal.      Left Ear: Hearing, tympanic membrane, ear canal and external ear normal.      Nose: Nose normal. No mucosal edema or rhinorrhea. Eyes:      General: No scleral icterus. Pupils: Pupils are equal, round, and reactive to light. Neck:      Thyroid: No thyroid mass or thyromegaly. Trachea: Trachea normal.   Cardiovascular:      Rate and Rhythm: Normal rate and regular rhythm. Heart sounds: Normal heart sounds, S1 normal and S2 normal. No murmur. Pulmonary:      Effort: Pulmonary effort is normal. No respiratory distress. Breath sounds: Normal breath sounds. No wheezing, rhonchi or rales. Chest:      Breasts:         Right: Mass (deep to surgical scar, slightly mobile indistinct borders) present. Abdominal:      General: Bowel sounds are normal.      Palpations: Abdomen is soft. Tenderness: There is no abdominal tenderness. Lymphadenopathy:      Cervical: No cervical adenopathy. Upper Body:      Right upper body: No axillary adenopathy. Left upper body: No axillary adenopathy. Skin:     General: Skin is warm and dry. Findings: No rash. Neurological:      Mental Status: She is alert. Cranial Nerves: No cranial nerve deficit. Motor: No abnormal muscle tone. Gait: Gait normal.         Assessment/Plan:    Curtis Lim was seen today for annual exam, orders and breast mass. Diagnoses and all orders for this visit:    Encounter for well adult exam with abnormal findings    Breakthrough bleeding  -     POCT urine pregnancy    Allergic rhinitis, unspecified seasonality, unspecified trigger    Masses of both breasts  -     US BREAST COMPLETE LEFT; Future  -     US BREAST COMPLETE RIGHT; Future  -     YASH DIAGNOSTIC W CAD LEFT; Future  -     YASH DIAGNOSTIC W CAD RIGHT; Future  -     Vinita Avalos MD, Breast Surgery, [facility]    Screening, lipid  -     Lipid Panel; Future    Screening for diabetes mellitus  -     GLUCOSE; Future            Patient Instructions   Sinus headaches  Avoid dairy! It is in everything, so read labels   Start Zyrtec-D as needed    Breakthrough bleeding  Start your new pack as usual        Breast mass  Check mammogram and ultrasound  Refer to breast surgery  To schedule your test call:  [facility]  Mon.-Fri. 7 a.m.- 8 p.m., Sat 8a. m.- 3 p.m. - [facility] ([phone])  Patients: Press Option 2  (a) To schedule a test, procedure or class- Press Option 1. Essence Pena MD      Documentation was done using voice recognition dragon software. Every effort was made to ensure accuracy; however, inadvertent, unintentional computerized transcription errors may be present. [Time Spent: ___ minutes] : I have spent [unfilled] minutes of time on the encounter.

## 2024-06-10 DIAGNOSIS — F41.9 ANXIETY: ICD-10-CM

## 2024-06-10 DIAGNOSIS — F32.A MILD DEPRESSION: ICD-10-CM

## 2024-06-10 RX ORDER — SERTRALINE HYDROCHLORIDE 100 MG/1
100 TABLET, FILM COATED ORAL DAILY
Qty: 30 TABLET | Refills: 2 | Status: CANCELLED | OUTPATIENT
Start: 2024-06-10

## 2024-06-17 NOTE — PROGRESS NOTES
infant sleep consultant she had previously discussed; she will follow up. Used CBT skills to discuss topics of lifestyle goals and accepting help from others.     Pt Behavioral Change Plan:   See above

## 2024-06-18 ENCOUNTER — TELEMEDICINE (OUTPATIENT)
Dept: PSYCHOLOGY | Age: 34
End: 2024-06-18
Payer: COMMERCIAL

## 2024-06-18 DIAGNOSIS — F33.0 MILD EPISODE OF RECURRENT MAJOR DEPRESSIVE DISORDER (HCC): ICD-10-CM

## 2024-06-18 DIAGNOSIS — F41.1 GENERALIZED ANXIETY DISORDER: Primary | ICD-10-CM

## 2024-06-18 PROCEDURE — 90832 PSYTX W PT 30 MINUTES: CPT | Performed by: PSYCHOLOGIST

## 2024-07-15 ENCOUNTER — TELEMEDICINE (OUTPATIENT)
Dept: PSYCHOLOGY | Age: 34
End: 2024-07-15
Payer: COMMERCIAL

## 2024-07-15 DIAGNOSIS — F41.1 GENERALIZED ANXIETY DISORDER: Primary | ICD-10-CM

## 2024-07-15 DIAGNOSIS — F33.0 MILD EPISODE OF RECURRENT MAJOR DEPRESSIVE DISORDER (HCC): ICD-10-CM

## 2024-07-15 PROCEDURE — 90832 PSYTX W PT 30 MINUTES: CPT | Performed by: PSYCHOLOGIST

## 2024-07-15 NOTE — PROGRESS NOTES
Behavioral Health Consultation  Victoria Chen Psy.D.  Psychologist  7/15/2024      Time spent with Patient: 25 minutes  This is patient's  28th  [facility] appointment.    Reason for Consult:    Chief Complaint   Patient presents with    Anxiety    Depression     Referring Provider: Judy Noel MD  [address] [facility] [place]  [address]  [place] [address]    Feedback given to referring provider.    TELEHEALTH VISIT -- Audio/Visual  Anthony ELLEN Bradford, was evaluated through a synchronous (real-time) audio-video encounter. The patient (or guardian if applicable) is aware that this is a billable service, which includes applicable co-pays. This Virtual Visit was conducted with patient's (and/or legal guardian's) consent.  Patient identification was verified, and a caregiver was present when appropriate. The patient was located in a state where the provider was licensed to provide care.     Conducted a risk-benefit analysis and determined that the patient's presenting problems are consistent with the use of telepsychology. Determined that the patient and/or guardian has sufficient knowledge and skills in the use of technology enabling them to adequately benefit from telepsychology. It was determined that this patient was able to be properly treated without an in-person session. Patient or guardian verified that they were currently located at the [place] address that was provided during registration. Discussed consent form for telehealth and patient or guardian provided verbal consent.    Verified the following information:  Patient's identification: Yes  Patient location: [address] [place] [address]   Patient's call back number: [phone]   Patient's emergency contact's name and number, as well as permission to contact them if needed: Extended Emergency Contact Information  Primary Emergency Contact: Sourav Bradford  Address: [address] [place] [address] [facility]  Home Phone: [phone]  Mobile

## 2024-07-29 ENCOUNTER — TELEMEDICINE (OUTPATIENT)
Dept: PSYCHOLOGY | Age: 34
End: 2024-07-29
Payer: COMMERCIAL

## 2024-07-29 DIAGNOSIS — F41.1 GENERALIZED ANXIETY DISORDER: Primary | ICD-10-CM

## 2024-07-29 DIAGNOSIS — F33.0 MILD EPISODE OF RECURRENT MAJOR DEPRESSIVE DISORDER (HCC): ICD-10-CM

## 2024-07-29 PROCEDURE — 90832 PSYTX W PT 30 MINUTES: CPT | Performed by: PSYCHOLOGIST

## 2024-07-29 NOTE — PROGRESS NOTES
Behavioral Health Consultation  Victoria Chen Psy.D.  Psychologist  7/29/2024      Time spent with Patient: 25 minutes  This is patient's  29th  [facility] appointment.    Reason for Consult:    Chief Complaint   Patient presents with    Anxiety    Other     Referring Provider: Judy Noel MD  [address] [facility] [place] [address]    Feedback given to referring provider.    TELEHEALTH VISIT -- Audio/Visual  Anthony ELLEN Bradford, was evaluated through a synchronous (real-time) audio-video encounter. The patient (or guardian if applicable) is aware that this is a billable service, which includes applicable co-pays. This Virtual Visit was conducted with patient's (and/or legal guardian's) consent.  Patient identification was verified, and a caregiver was present when appropriate. The patient was located in a state where the provider was licensed to provide care.     Conducted a risk-benefit analysis and determined that the patient's presenting problems are consistent with the use of telepsychology. Determined that the patient and/or guardian has sufficient knowledge and skills in the use of technology enabling them to adequately benefit from telepsychology. It was determined that this patient was able to be properly treated without an in-person session. Patient or guardian verified that they were currently located at the [place] address that was provided during registration. Discussed consent form for telehealth and patient or guardian provided verbal consent.    Verified the following information:  Patient's identification: Yes  Patient location: [address] [place] [address]   Patient's call back number: [phone]   Patient's emergency contact's name and number, as well as permission to contact them if needed: Extended Emergency Contact Information  Primary Emergency Contact: Sourav Bradford  Address: [address] [place] [address] [facility]  Home Phone: [phone]  Mobile Phone:

## 2024-08-20 ENCOUNTER — TELEMEDICINE (OUTPATIENT)
Dept: PSYCHOLOGY | Age: 34
End: 2024-08-20
Payer: COMMERCIAL

## 2024-08-20 DIAGNOSIS — F33.0 MILD EPISODE OF RECURRENT MAJOR DEPRESSIVE DISORDER (HCC): ICD-10-CM

## 2024-08-20 DIAGNOSIS — F41.1 GENERALIZED ANXIETY DISORDER: Primary | ICD-10-CM

## 2024-08-20 PROCEDURE — 90832 PSYTX W PT 30 MINUTES: CPT | Performed by: PSYCHOLOGIST

## 2024-08-20 NOTE — PROGRESS NOTES
of a viable male infant over an intact perineum in OA position. +nuchal cord reduced on the perineum. Shoulders del without difficulty. Placenta del spont, intact with 3VC. 2nd degree PV laceration repaired with 3-0vicryl. EBL 250ml. Behavioral Health Consultation  Victoria Chen Psy.D.  Psychologist  8/20/2024      Time spent with Patient: 25 minutes  This is patient's  30th  [facility] appointment.    Reason for Consult:    Chief Complaint   Patient presents with    Anxiety    Depression     Referring Provider: Judy Noel MD  [address] [place] [address]  [place] [address]    Feedback given to referring provider.    TELEHEALTH VISIT -- Audio/Visual  Anthony ELLEN Bradford, was evaluated through a synchronous (real-time) audio-video encounter. The patient (or guardian if applicable) is aware that this is a billable service, which includes applicable co-pays. This Virtual Visit was conducted with patient's (and/or legal guardian's) consent.  Patient identification was verified, and a caregiver was present when appropriate. The patient was located in a state where the provider was licensed to provide care.     Conducted a risk-benefit analysis and determined that the patient's presenting problems are consistent with the use of telepsychology. Determined that the patient and/or guardian has sufficient knowledge and skills in the use of technology enabling them to adequately benefit from telepsychology. It was determined that this patient was able to be properly treated without an in-person session. Patient or guardian verified that they were currently located at the [place] address that was provided during registration. Discussed consent form for telehealth and patient or guardian provided verbal consent.    Verified the following information:  Patient's identification: Yes  Patient location: [address] [place] [address]   Patient's call back number: [phone]   Patient's emergency contact's name and number, as well as permission to contact them if needed: Extended Emergency Contact Information  Primary Emergency Contact: Sourav Bradford  Address: [address] [place] [address] [facility]  Home Phone: [phone]  Mobile

## 2024-09-03 ENCOUNTER — TELEMEDICINE (OUTPATIENT)
Dept: PSYCHOLOGY | Age: 34
End: 2024-09-03
Payer: COMMERCIAL

## 2024-09-03 DIAGNOSIS — F41.1 GENERALIZED ANXIETY DISORDER: Primary | ICD-10-CM

## 2024-09-03 DIAGNOSIS — F33.0 MILD EPISODE OF RECURRENT MAJOR DEPRESSIVE DISORDER (HCC): ICD-10-CM

## 2024-09-03 PROCEDURE — 90832 PSYTX W PT 30 MINUTES: CPT | Performed by: PSYCHOLOGIST

## 2024-09-03 NOTE — PROGRESS NOTES
Behavioral Health Consultation  Victoria Chen Psy.D.  Psychologist  9/3/2024      Time spent with Patient: 25 minutes  This is patient's  31st  [facility] appointment.    Reason for Consult:    Chief Complaint   Patient presents with    Anxiety     Referring Provider: Judy Noel MD  [address] [place] [address]  [address] [place] [address]    Feedback given to referring provider.    TELEHEALTH VISIT -- Audio/Visual  Anthony ELLEN Bradford, was evaluated through a synchronous (real-time) audio-video encounter. The patient (or guardian if applicable) is aware that this is a billable service, which includes applicable co-pays. This Virtual Visit was conducted with patient's (and/or legal guardian's) consent.  Patient identification was verified, and a caregiver was present when appropriate. The patient was located in a state where the provider was licensed to provide care.     Conducted a risk-benefit analysis and determined that the patient's presenting problems are consistent with the use of telepsychology. Determined that the patient and/or guardian has sufficient knowledge and skills in the use of technology enabling them to adequately benefit from telepsychology. It was determined that this patient was able to be properly treated without an in-person session. Patient or guardian verified that they were currently located at the [place] address that was provided during registration. Discussed consent form for telehealth and patient or guardian provided verbal consent.    Verified the following information:  Patient's identification: Yes  Patient location: [address] [place] [address]   Patient's call back number: [phone]   Patient's emergency contact's name and number, as well as permission to contact them if needed: Extended Emergency Contact Information  Primary Emergency Contact: Sourav Bradford  Address: [address] [place] [address] [facility]  Home Phone: [phone]  Mobile Phone:

## 2024-09-05 DIAGNOSIS — F41.9 ANXIETY: ICD-10-CM

## 2024-09-05 DIAGNOSIS — F32.A MILD DEPRESSION: ICD-10-CM

## 2024-09-05 RX ORDER — SERTRALINE HYDROCHLORIDE 100 MG/1
100 TABLET, FILM COATED ORAL DAILY
Qty: 90 TABLET | Refills: 1 | Status: SHIPPED | OUTPATIENT
Start: 2024-09-05

## 2024-09-05 NOTE — TELEPHONE ENCOUNTER
Medication:   Requested Prescriptions     Pending Prescriptions Disp Refills    sertraline (ZOLOFT) 100 MG tablet [Pharmacy Med Name: SERTRALINE 100MG TABLETS] 30 tablet 2     Sig: TAKE 1 TABLET BY MOUTH DAILY        Last Filled:  5/7/2024    Patient Phone Number: [phone] (home)     Last appt: 2/27/2024   Next appt: 9/11/2024    Last OARRS:        No data to display

## 2024-09-11 ENCOUNTER — OFFICE VISIT (OUTPATIENT)
Dept: PRIMARY CARE CLINIC | Age: 34
End: 2024-09-11
Payer: COMMERCIAL

## 2024-09-11 VITALS
BODY MASS INDEX: 33.62 KG/M2 | WEIGHT: 214.2 LBS | SYSTOLIC BLOOD PRESSURE: 120 MMHG | OXYGEN SATURATION: 98 % | DIASTOLIC BLOOD PRESSURE: 86 MMHG | RESPIRATION RATE: 16 BRPM | HEART RATE: 86 BPM | HEIGHT: 67 IN

## 2024-09-11 DIAGNOSIS — R59.1 LYMPHADENOPATHY: ICD-10-CM

## 2024-09-11 DIAGNOSIS — B96.89 ACUTE BACTERIAL SINUSITIS: ICD-10-CM

## 2024-09-11 DIAGNOSIS — J01.90 ACUTE BACTERIAL SINUSITIS: ICD-10-CM

## 2024-09-11 DIAGNOSIS — Z00.00 ENCOUNTER FOR WELL ADULT EXAM WITHOUT ABNORMAL FINDINGS: Primary | ICD-10-CM

## 2024-09-11 PROBLEM — N87.9 DYSPLASIA OF CERVIX: Chronic | Status: ACTIVE | Noted: 2019-07-26

## 2024-09-11 PROBLEM — J30.2 SEASONAL ALLERGIC RHINITIS: Chronic | Status: ACTIVE | Noted: 2023-12-12

## 2024-09-11 PROBLEM — N87.9 DYSPLASIA OF CERVIX: Status: RESOLVED | Noted: 2019-07-26 | Resolved: 2024-09-11

## 2024-09-11 PROCEDURE — 99395 PREV VISIT EST AGE 18-39: CPT | Performed by: FAMILY MEDICINE

## 2024-09-11 RX ORDER — METHYLPREDNISOLONE 4 MG
TABLET, DOSE PACK ORAL
Qty: 1 KIT | Refills: 0 | Status: SHIPPED | OUTPATIENT
Start: 2024-09-11 | End: 2024-09-17

## 2024-09-11 SDOH — ECONOMIC STABILITY: FOOD INSECURITY: WITHIN THE PAST 12 MONTHS, THE FOOD YOU BOUGHT JUST DIDN'T LAST AND YOU DIDN'T HAVE MONEY TO GET MORE.: NEVER TRUE

## 2024-09-11 SDOH — ECONOMIC STABILITY: FOOD INSECURITY: WITHIN THE PAST 12 MONTHS, YOU WORRIED THAT YOUR FOOD WOULD RUN OUT BEFORE YOU GOT MONEY TO BUY MORE.: NEVER TRUE

## 2024-09-11 SDOH — ECONOMIC STABILITY: INCOME INSECURITY: HOW HARD IS IT FOR YOU TO PAY FOR THE VERY BASICS LIKE FOOD, HOUSING, MEDICAL CARE, AND HEATING?: NOT HARD AT ALL

## 2024-09-11 ASSESSMENT — ENCOUNTER SYMPTOMS
SINUS PRESSURE: 1
CONSTIPATION: 0
ABDOMINAL PAIN: 0
CHEST TIGHTNESS: 0
SORE THROAT: 0
DIARRHEA: 0
SINUS PAIN: 0
RHINORRHEA: 0
SHORTNESS OF BREATH: 0

## 2024-09-12 DIAGNOSIS — Z00.00 ENCOUNTER FOR WELL ADULT EXAM WITHOUT ABNORMAL FINDINGS: ICD-10-CM

## 2024-09-12 LAB
ALBUMIN SERPL-MCNC: 4.4 G/DL (ref 3.4–5)
ALBUMIN/GLOB SERPL: 1.5 {RATIO} (ref 1.1–2.2)
ALP SERPL-CCNC: 54 U/L (ref 40–129)
ALT SERPL-CCNC: 18 U/L (ref 10–40)
ANION GAP SERPL CALCULATED.3IONS-SCNC: 13 MMOL/L (ref 3–16)
AST SERPL-CCNC: 17 U/L (ref 15–37)
BASOPHILS # BLD: 0 K/UL (ref 0–0.2)
BASOPHILS NFR BLD: 0.4 %
BILIRUB SERPL-MCNC: 0.5 MG/DL (ref 0–1)
BUN SERPL-MCNC: 15 MG/DL (ref 7–20)
CALCIUM SERPL-MCNC: 9.6 MG/DL (ref 8.3–10.6)
CHLORIDE SERPL-SCNC: 102 MMOL/L (ref 99–110)
CHOLEST SERPL-MCNC: 202 MG/DL (ref 0–199)
CO2 SERPL-SCNC: 25 MMOL/L (ref 21–32)
CREAT SERPL-MCNC: 0.8 MG/DL (ref 0.6–1.1)
DEPRECATED RDW RBC AUTO: 13.9 % (ref 12.4–15.4)
EOSINOPHIL # BLD: 0.1 K/UL (ref 0–0.6)
EOSINOPHIL NFR BLD: 1.3 %
GFR SERPLBLD CREATININE-BSD FMLA CKD-EPI: >90 ML/MIN/{1.73_M2}
GLUCOSE SERPL-MCNC: 81 MG/DL (ref 70–99)
HCT VFR BLD AUTO: 40.9 % (ref 36–48)
HDLC SERPL-MCNC: 59 MG/DL (ref 40–60)
HGB BLD-MCNC: 13.3 G/DL (ref 12–16)
LDLC SERPL CALC-MCNC: 134 MG/DL
LYMPHOCYTES # BLD: 1.6 K/UL (ref 1–5.1)
LYMPHOCYTES NFR BLD: 24.5 %
MCH RBC QN AUTO: 28.3 PG (ref 26–34)
MCHC RBC AUTO-ENTMCNC: 32.4 G/DL (ref 31–36)
MCV RBC AUTO: 87.4 FL (ref 80–100)
MONOCYTES # BLD: 0.5 K/UL (ref 0–1.3)
MONOCYTES NFR BLD: 7.5 %
NEUTROPHILS # BLD: 4.4 K/UL (ref 1.7–7.7)
NEUTROPHILS NFR BLD: 66.3 %
PLATELET # BLD AUTO: 227 K/UL (ref 135–450)
PMV BLD AUTO: 10.1 FL (ref 5–10.5)
POTASSIUM SERPL-SCNC: 4.6 MMOL/L (ref 3.5–5.1)
PROT SERPL-MCNC: 7.3 G/DL (ref 6.4–8.2)
RBC # BLD AUTO: 4.68 M/UL (ref 4–5.2)
SODIUM SERPL-SCNC: 140 MMOL/L (ref 136–145)
TRIGL SERPL-MCNC: 46 MG/DL (ref 0–150)
TSH SERPL DL<=0.005 MIU/L-ACNC: 0.98 UIU/ML (ref 0.27–4.2)
VLDLC SERPL CALC-MCNC: 9 MG/DL
WBC # BLD AUTO: 6.6 K/UL (ref 4–11)

## 2024-09-13 ENCOUNTER — PATIENT MESSAGE (OUTPATIENT)
Dept: PRIMARY CARE CLINIC | Age: 34
End: 2024-09-13

## 2024-09-13 LAB
EST. AVERAGE GLUCOSE BLD GHB EST-MCNC: 105.4 MG/DL
HBA1C MFR BLD: 5.3 %

## 2024-09-23 ENCOUNTER — TELEMEDICINE (OUTPATIENT)
Dept: PSYCHOLOGY | Age: 34
End: 2024-09-23
Payer: COMMERCIAL

## 2024-09-23 DIAGNOSIS — F41.1 GENERALIZED ANXIETY DISORDER: Primary | ICD-10-CM

## 2024-09-23 DIAGNOSIS — F33.0 MILD EPISODE OF RECURRENT MAJOR DEPRESSIVE DISORDER (HCC): ICD-10-CM

## 2024-09-23 PROCEDURE — 90832 PSYTX W PT 30 MINUTES: CPT | Performed by: PSYCHOLOGIST

## 2024-10-07 ENCOUNTER — TELEMEDICINE (OUTPATIENT)
Dept: PSYCHOLOGY | Age: 34
End: 2024-10-07
Payer: COMMERCIAL

## 2024-10-07 DIAGNOSIS — F33.0 MILD EPISODE OF RECURRENT MAJOR DEPRESSIVE DISORDER (HCC): ICD-10-CM

## 2024-10-07 DIAGNOSIS — F41.1 GENERALIZED ANXIETY DISORDER: Primary | ICD-10-CM

## 2024-10-07 PROCEDURE — 90832 PSYTX W PT 30 MINUTES: CPT | Performed by: PSYCHOLOGIST

## 2024-10-07 NOTE — PROGRESS NOTES
Behavioral Health Consultation  Victoria Chen Psy.D.  Psychologist  10/7/2024      Time spent with Patient: 25 minutes  This is patient's  33rd  [facility] appointment.    Reason for Consult:    Chief Complaint   Patient presents with    Anxiety     Referring Provider: Judy Noel MD  [address] [place] [address]  [address]  [place] [address]    Feedback given to referring provider.    TELEHEALTH VISIT -- Audio/Visual  Anthony ELLEN Bradford, was evaluated through a synchronous (real-time) audio-video encounter. The patient (or guardian if applicable) is aware that this is a billable service, which includes applicable co-pays. This Virtual Visit was conducted with patient's (and/or legal guardian's) consent.  Patient identification was verified, and a caregiver was present when appropriate. The patient was located in a state where the provider was licensed to provide care.     Conducted a risk-benefit analysis and determined that the patient's presenting problems are consistent with the use of telepsychology. Determined that the patient and/or guardian has sufficient knowledge and skills in the use of technology enabling them to adequately benefit from telepsychology. It was determined that this patient was able to be properly treated without an in-person session. Patient or guardian verified that they were currently located at the [place] address that was provided during registration. Discussed consent form for telehealth and patient or guardian provided verbal consent.    Verified the following information:  Patient's identification: Yes  Patient location: [address] [place] [address]   Patient's call back number: [phone]   Patient's emergency contact's name and number, as well as permission to contact them if needed: Extended Emergency Contact Information  Primary Emergency Contact: Sourav Bradford  Address: [address] [place] [address] [facility]  Home Phone: [phone]  Mobile Phone:

## 2024-10-28 ENCOUNTER — TELEMEDICINE (OUTPATIENT)
Dept: PSYCHOLOGY | Age: 34
End: 2024-10-28
Payer: COMMERCIAL

## 2024-10-28 DIAGNOSIS — F41.1 GENERALIZED ANXIETY DISORDER: Primary | ICD-10-CM

## 2024-10-28 DIAGNOSIS — F33.0 MILD EPISODE OF RECURRENT MAJOR DEPRESSIVE DISORDER (HCC): ICD-10-CM

## 2024-10-28 PROCEDURE — 90832 PSYTX W PT 30 MINUTES: CPT | Performed by: PSYCHOLOGIST

## 2024-10-28 NOTE — PROGRESS NOTES
Behavioral Health Consultation  Victoria Chen Psy.D.  Psychologist  10/28/2024      Time spent with Patient: 25 minutes  This is patient's  34th  [facility] appointment.    Reason for Consult:    Chief Complaint   Patient presents with    Anxiety    Depression     Referring Provider: Judy Noel MD  [address] [facility] [place] [address]    Feedback given to referring provider.    TELEHEALTH VISIT -- Audio/Visual  Anthony ELLEN Bradford, was evaluated through a synchronous (real-time) audio-video encounter. The patient (or guardian if applicable) is aware that this is a billable service, which includes applicable co-pays. This Virtual Visit was conducted with patient's (and/or legal guardian's) consent.  Patient identification was verified, and a caregiver was present when appropriate. The patient was located in a state where the provider was licensed to provide care.     Conducted a risk-benefit analysis and determined that the patient's presenting problems are consistent with the use of telepsychology. Determined that the patient and/or guardian has sufficient knowledge and skills in the use of technology enabling them to adequately benefit from telepsychology. It was determined that this patient was able to be properly treated without an in-person session. Patient or guardian verified that they were currently located at the [place] address that was provided during registration. Discussed consent form for telehealth and patient or guardian provided verbal consent.    Verified the following information:  Patient's identification: Yes  Patient location: [address] [place] [address]  Patient's call back number: [phone]   Patient's emergency contact's name and number, as well as permission to contact them if needed: Extended Emergency Contact Information  Primary Emergency Contact: Sourav Bradford  Address: [address] [place] [address] [facility]  Home Phone: [phone]  Mobile Phone:

## 2024-11-07 ENCOUNTER — TELEMEDICINE (OUTPATIENT)
Dept: PSYCHOLOGY | Age: 34
End: 2024-11-07
Payer: COMMERCIAL

## 2024-11-07 DIAGNOSIS — F33.0 MILD EPISODE OF RECURRENT MAJOR DEPRESSIVE DISORDER (HCC): ICD-10-CM

## 2024-11-07 DIAGNOSIS — F41.1 GENERALIZED ANXIETY DISORDER: Primary | ICD-10-CM

## 2024-11-07 PROCEDURE — 90832 PSYTX W PT 30 MINUTES: CPT | Performed by: PSYCHOLOGIST

## 2024-11-07 NOTE — PROGRESS NOTES
Behavioral Health Consultation  Victoria Chen Psy.D.  Psychologist  11/7/2024      Time spent with Patient: 25 minutes  This is patient's  35th  [facility] appointment.    Reason for Consult:    Chief Complaint   Patient presents with    Anxiety     Referring Provider: Judy Noel MD  [address] [facility]  [address]  [place] [id number]    Feedback given to referring provider.    TELEHEALTH VISIT -- Audio/Visual  Anthony ELLEN Bradford, was evaluated through a synchronous (real-time) audio-video encounter. The patient (or guardian if applicable) is aware that this is a billable service, which includes applicable co-pays. This Virtual Visit was conducted with patient's (and/or legal guardian's) consent.  Patient identification was verified, and a caregiver was present when appropriate. The patient was located in a state where the provider was licensed to provide care.     Conducted a risk-benefit analysis and determined that the patient's presenting problems are consistent with the use of telepsychology. Determined that the patient and/or guardian has sufficient knowledge and skills in the use of technology enabling them to adequately benefit from telepsychology. It was determined that this patient was able to be properly treated without an in-person session. Patient or guardian verified that they were currently located at the [place] address that was provided during registration. Discussed consent form for telehealth and patient or guardian provided verbal consent.    Verified the following information:  Patient's identification: Yes  Patient location: [address] [place] [address]   Patient's call back number: [phone]   Patient's emergency contact's name and number, as well as permission to contact them if needed: Extended Emergency Contact Information  Primary Emergency Contact: Sourav Bradford  Address: [address] [place] [address]  Home Phone:

## 2024-11-26 ENCOUNTER — TELEMEDICINE (OUTPATIENT)
Dept: PSYCHOLOGY | Age: 34
End: 2024-11-26
Payer: COMMERCIAL

## 2024-11-26 DIAGNOSIS — F33.0 MILD EPISODE OF RECURRENT MAJOR DEPRESSIVE DISORDER (HCC): ICD-10-CM

## 2024-11-26 DIAGNOSIS — F41.1 GENERALIZED ANXIETY DISORDER: Primary | ICD-10-CM

## 2024-11-26 PROCEDURE — 90832 PSYTX W PT 30 MINUTES: CPT | Performed by: PSYCHOLOGIST

## 2024-11-26 NOTE — PROGRESS NOTES
[phone]  Relation: Emergency Contact   needed? No  Secondary Emergency Contact: Roxanna Leyva  Home Phone: [phone]  Mobile Phone: [phone]  Relation: Parent   needed? No     Provider location: [place], [place]     S:  Anthony reports that she enjoyed her trip to [place], which she found both restful and fun. They were able to re-connect with friends. They discussed having another child. Anthony is considering how to resolve conflict with best friend. She is feeling that their values do not align as well as they did before, is utilizing more boundaries.     O:  MSE:  Appearance    alert, cooperative  Appetite normal  Sleep disturbance Yes  Fatigue Yes  Loss of pleasure No  Impulsive behavior No  Speech    normal rate and normal volume  Mood    euthymic   Affect    normal affect  Thought Content    intact  Thought Process    linear  Associations    logical connections  Insight    Good  Judgment    Intact  Orientation    oriented to person, place, time, and general circumstances  Memory    recent and remote memory intact  Attention/Concentration    intact  Morbid ideation No  Suicide Assessment    no suicidal ideation    A:  Anthony presents for a follow up [facility] appointment regarding concerns related to depression and anxiety.  These symptoms are improving.  Safety concerns reported include: none.    Diagnosis:  1. Generalized anxiety disorder    2. Mild episode of recurrent major depressive disorder (HCC)        Plan:  Pt interventions:  Reviewed progress and provided praise for effective coping. Discussed boundaries, assertiveness and how to utilize these in interpersonal relationships. Encouraged her to evaluate what role she wants this friendship to play in her life before attempting to repair it.    Pt Behavioral Change Plan:   See above

## 2024-12-10 ENCOUNTER — TELEMEDICINE (OUTPATIENT)
Dept: PRIMARY CARE CLINIC | Age: 34
End: 2024-12-10
Payer: COMMERCIAL

## 2024-12-10 ENCOUNTER — TELEPHONE (OUTPATIENT)
Dept: PRIMARY CARE CLINIC | Age: 34
End: 2024-12-10

## 2024-12-10 DIAGNOSIS — N92.1 METRORRHAGIA: Primary | ICD-10-CM

## 2024-12-10 DIAGNOSIS — F32.A MILD DEPRESSION: ICD-10-CM

## 2024-12-10 PROCEDURE — G8484 FLU IMMUNIZE NO ADMIN: HCPCS | Performed by: FAMILY MEDICINE

## 2024-12-10 PROCEDURE — 1036F TOBACCO NON-USER: CPT | Performed by: FAMILY MEDICINE

## 2024-12-10 PROCEDURE — G8417 CALC BMI ABV UP PARAM F/U: HCPCS | Performed by: FAMILY MEDICINE

## 2024-12-10 PROCEDURE — 99214 OFFICE O/P EST MOD 30 MIN: CPT | Performed by: FAMILY MEDICINE

## 2024-12-10 PROCEDURE — G8427 DOCREV CUR MEDS BY ELIG CLIN: HCPCS | Performed by: FAMILY MEDICINE

## 2024-12-10 ASSESSMENT — ENCOUNTER SYMPTOMS
CHEST TIGHTNESS: 0
ABDOMINAL PAIN: 0
RHINORRHEA: 0
SORE THROAT: 0
CONSTIPATION: 0
DIARRHEA: 0
SHORTNESS OF BREATH: 0

## 2024-12-10 NOTE — PROGRESS NOTES
she is planning to have another child soon or may take a break for a washout period and resume in about a month or consider switching contraceptive to a new option.  Patient will discuss with  and reach back out.    2. Mild depression  Reassured no suicidal homicidal ideation will follow-up with her psychologist and reach out if symptoms persist or worsen.    Anthony Bradford  is a 34 y.o. female being evaluated by a Virtual Visit (video visit) encounter to address concerns as mentioned above.  A caregiver was present when appropriate. Due to this being a TeleHealth encounter evaluation of the following organ systems was limited: Vitals/Constitutional/EENT/Resp/CV/GI//MS/Neuro/Skin/Heme-Lymph-Imm. This Virtual Visit was conducted with patient's (and/or legal guardian's) consent. The patient (and/or legal guardian) has also been advised to contact this office for worsening conditions or problems, and seek emergency medical treatment and/or call 911 if deemed necessary.     Patient identification was verified at the start of the visit: Yes    Patient present in [place].    Total time spent for this encounter: Not billed by time    Services were provided through a video synchronous discussion virtually to substitute for in-person clinic visit. Patient  located at their individual homes.               This chart note was prepared using a voice recognition dictation program. This note was reviewed for accuracy; however, addition, deletion and sound-alike word errors may occur. If there are any questions regarding this chart note, please contact the originating provider.    Electronically signed by   Judy Noel MD  12/10/2024   2:47 PM    No follow-ups on file.

## 2024-12-11 DIAGNOSIS — F41.9 ANXIETY: ICD-10-CM

## 2024-12-11 DIAGNOSIS — F32.A MILD DEPRESSION: ICD-10-CM

## 2024-12-11 RX ORDER — SERTRALINE HYDROCHLORIDE 100 MG/1
100 TABLET, FILM COATED ORAL DAILY
Qty: 90 TABLET | Refills: 1 | OUTPATIENT
Start: 2024-12-11

## 2024-12-16 ENCOUNTER — TELEMEDICINE (OUTPATIENT)
Dept: PSYCHOLOGY | Age: 34
End: 2024-12-16
Payer: COMMERCIAL

## 2024-12-16 DIAGNOSIS — F41.1 GENERALIZED ANXIETY DISORDER: Primary | ICD-10-CM

## 2024-12-16 DIAGNOSIS — F33.0 MILD EPISODE OF RECURRENT MAJOR DEPRESSIVE DISORDER (HCC): ICD-10-CM

## 2024-12-16 PROCEDURE — 90832 PSYTX W PT 30 MINUTES: CPT | Performed by: PSYCHOLOGIST

## 2025-01-13 ENCOUNTER — TELEMEDICINE (OUTPATIENT)
Dept: PSYCHOLOGY | Age: 35
End: 2025-01-13
Payer: COMMERCIAL

## 2025-01-13 DIAGNOSIS — F33.0 MILD EPISODE OF RECURRENT MAJOR DEPRESSIVE DISORDER (HCC): ICD-10-CM

## 2025-01-13 DIAGNOSIS — F41.1 GENERALIZED ANXIETY DISORDER: Primary | ICD-10-CM

## 2025-01-13 PROCEDURE — 90832 PSYTX W PT 30 MINUTES: CPT | Performed by: PSYCHOLOGIST

## 2025-01-13 NOTE — PROGRESS NOTES
Behavioral Health Consultation  Victoria Chen Psy.D.  Psychologist  1/13/2025      Time spent with Patient: 25 minutes  This is patient's  38th  [facility] appointment.    Reason for Consult:    Chief Complaint   Patient presents with    Anxiety    Depression     Referring Provider: Judy Noel MD  [address] [facility]  [address]  [place] [address]    Feedback given to referring provider.    TELEHEALTH VISIT -- Audio/Visual  Anthony ELLEN Bradford, was evaluated through a synchronous (real-time) audio-video encounter. The patient (or guardian if applicable) is aware that this is a billable service, which includes applicable co-pays. This Virtual Visit was conducted with patient's (and/or legal guardian's) consent.  Patient identification was verified, and a caregiver was present when appropriate. The patient was located in a state where the provider was licensed to provide care.     Conducted a risk-benefit analysis and determined that the patient's presenting problems are consistent with the use of telepsychology. Determined that the patient and/or guardian has sufficient knowledge and skills in the use of technology enabling them to adequately benefit from telepsychology. It was determined that this patient was able to be properly treated without an in-person session. Patient or guardian verified that they were currently located at the [place] address that was provided during registration. Discussed consent form for telehealth and patient or guardian provided verbal consent.    Verified the following information:  Patient's identification: Yes  Patient location: [address] [place] [address]   Patient's call back number: [phone]   Patient's emergency contact's name and number, as well as permission to contact them if needed: Extended Emergency Contact Information  Primary Emergency Contact: Sourav Bradford  Address: [address] [place] [address] [facility] of [facility]  Home Phone: [phone]  Mobile

## 2025-01-27 ENCOUNTER — TELEMEDICINE (OUTPATIENT)
Dept: PSYCHOLOGY | Age: 35
End: 2025-01-27
Payer: COMMERCIAL

## 2025-01-27 DIAGNOSIS — F41.1 GENERALIZED ANXIETY DISORDER: Primary | ICD-10-CM

## 2025-01-27 DIAGNOSIS — F33.0 MILD EPISODE OF RECURRENT MAJOR DEPRESSIVE DISORDER (HCC): ICD-10-CM

## 2025-01-27 PROCEDURE — 90832 PSYTX W PT 30 MINUTES: CPT | Performed by: PSYCHOLOGIST

## 2025-01-27 NOTE — PROGRESS NOTES
Behavioral Health Consultation  Victoria Chen Psy.D.  Psychologist  1/27/2025      Time spent with Patient: 25 minutes  This is patient's  39th  [facility] appointment.    Reason for Consult:    Chief Complaint   Patient presents with    Anxiety    Depression     Referring Provider: Judy Noel MD  [address] [facility]  [address]  [place] [id number]    Feedback given to referring provider.    TELEHEALTH VISIT -- Audio/Visual  Anthony ELLEN Bradford, was evaluated through a synchronous (real-time) audio-video encounter. The patient (or guardian if applicable) is aware that this is a billable service, which includes applicable co-pays. This Virtual Visit was conducted with patient's (and/or legal guardian's) consent.  Patient identification was verified, and a caregiver was present when appropriate. The patient was located in a state where the provider was licensed to provide care.     Conducted a risk-benefit analysis and determined that the patient's presenting problems are consistent with the use of telepsychology. Determined that the patient and/or guardian has sufficient knowledge and skills in the use of technology enabling them to adequately benefit from telepsychology. It was determined that this patient was able to be properly treated without an in-person session. Patient or guardian verified that they were currently located at the [place] address that was provided during registration. Discussed consent form for telehealth and patient or guardian provided verbal consent.    Verified the following information:  Patient's identification: Yes  Patient location: [address] [place] [address]   Patient's call back number: [phone]   Patient's emergency contact's name and number, as well as permission to contact them if needed: Extended Emergency Contact Information  Primary Emergency Contact: Sourav Bradford  Address: [address] [place] [address]  Home

## 2025-02-24 ENCOUNTER — TELEMEDICINE (OUTPATIENT)
Dept: PSYCHOLOGY | Age: 35
End: 2025-02-24
Payer: COMMERCIAL

## 2025-02-24 DIAGNOSIS — F33.0 MILD EPISODE OF RECURRENT MAJOR DEPRESSIVE DISORDER: ICD-10-CM

## 2025-02-24 DIAGNOSIS — F41.1 GENERALIZED ANXIETY DISORDER: Primary | ICD-10-CM

## 2025-02-24 PROCEDURE — 90832 PSYTX W PT 30 MINUTES: CPT | Performed by: PSYCHOLOGIST

## 2025-02-24 NOTE — PROGRESS NOTES
Behavioral Health Consultation  Victoria Chen Psy.D.  Psychologist  2/24/2025      Time spent with Patient: 25 minutes  This is patient's  40th  [facility] appointment.    Reason for Consult:    Chief Complaint   Patient presents with    Anxiety    Depression     Referring Provider: Judy Noel MD  [address] [facility]  [place] [address]    Feedback given to referring provider.    TELEHEALTH VISIT -- Audio/Visual  Anthony ELLEN Bradford, was evaluated through a synchronous (real-time) audio-video encounter. The patient (or guardian if applicable) is aware that this is a billable service, which includes applicable co-pays. This Virtual Visit was conducted with patient's (and/or legal guardian's) consent.  Patient identification was verified, and a caregiver was present when appropriate. The patient was located in a state where the provider was licensed to provide care.     Conducted a risk-benefit analysis and determined that the patient's presenting problems are consistent with the use of telepsychology. Determined that the patient and/or guardian has sufficient knowledge and skills in the use of technology enabling them to adequately benefit from telepsychology. It was determined that this patient was able to be properly treated without an in-person session. Patient or guardian verified that they were currently located at the [place] address that was provided during registration. Discussed consent form for telehealth and patient or guardian provided verbal consent.    Verified the following information:  Patient's identification: Yes  Patient location: [address] [place] [address]  Patient's call back number: [phone]   Patient's emergency contact's name and number, as well as permission to contact them if needed: Extended Emergency Contact Information  Primary Emergency Contact: Sourav Bradford  Address: [address] [place] [address] [facility]  Home Phone: [phone]  Mobile

## 2025-03-11 ENCOUNTER — OFFICE VISIT (OUTPATIENT)
Dept: PSYCHOLOGY | Age: 35
End: 2025-03-11
Payer: COMMERCIAL

## 2025-03-11 DIAGNOSIS — F41.9 ANXIETY: ICD-10-CM

## 2025-03-11 DIAGNOSIS — F33.0 MILD EPISODE OF RECURRENT MAJOR DEPRESSIVE DISORDER: ICD-10-CM

## 2025-03-11 DIAGNOSIS — F32.A MILD DEPRESSION: ICD-10-CM

## 2025-03-11 DIAGNOSIS — F41.1 GENERALIZED ANXIETY DISORDER: Primary | ICD-10-CM

## 2025-03-11 PROCEDURE — 90832 PSYTX W PT 30 MINUTES: CPT | Performed by: PSYCHOLOGIST

## 2025-03-11 PROCEDURE — 1036F TOBACCO NON-USER: CPT | Performed by: PSYCHOLOGIST

## 2025-03-11 RX ORDER — PSEUDOEPHEDRINE HCL 120 MG/1
120 TABLET, FILM COATED, EXTENDED RELEASE ORAL EVERY 12 HOURS
Qty: 14 TABLET | Refills: 0 | Status: SHIPPED | OUTPATIENT
Start: 2025-03-11 | End: 2025-03-18

## 2025-03-11 RX ORDER — SERTRALINE HYDROCHLORIDE 100 MG/1
100 TABLET, FILM COATED ORAL DAILY
Qty: 90 TABLET | Refills: 1 | Status: SHIPPED | OUTPATIENT
Start: 2025-03-11

## 2025-03-12 NOTE — PROGRESS NOTES
Behavioral Health Consultation  Victoria Chen PsyD  Licensed Psychologist  3/11/2025        Time spent with client or family: 25 minutes  This is patient's  41st  [facility] appointment.    Reason for Consult:    Chief Complaint   Patient presents with    Anxiety    Depression     Referring Provider: Judy Noel MD  [address] [facility] [place]  [address]  [place] [address]    Feedback given to referring provider.    S:  Anthony presents today with her toddler son. She reports that his behavior is generally improving but they will continue behavior strategies they are using now. Anthony felt a bout of increased anxiety this weekend but is unsure why; she experienced it as somewhat ego-dystonic. Anthony has been keeping effective boundaries with friends. She and her  plan to have their son go to grandmothers one weekend per month so they have more time to themselves and she feels good about this plan. They have been having some success in decreasing nursing at night, are hoping this continue to help sleep.       O:  MSE:  Appearance    alert, cooperative  Appetite normal  Sleep disturbance Yes  Fatigue Yes  Loss of pleasure No  Impulsive behavior No  Speech    normal rate and normal volume  Mood    euthymic   Affect    slightly flat affect  Thought Content    intact  Thought Process    linear and goal directed  Associations    logical connections  Insight    Good  Judgment    Intact  Orientation    oriented to person, place, time, and general circumstances  Memory    recent and remote memory intact  Attention/Concentration    intact  Morbid ideation No  Suicide Assessment    no suicidal ideation    A:  Anthony presents for a follow up [facility] appointment regarding concerns related to depression and anxiety.  These symptoms are improving.  Safety concerns reported include: none.    Diagnosis:  1. Generalized anxiety disorder    2. Mild episode of recurrent major depressive disorder        Plan:  Pt interventions:  Reviewed

## 2025-03-18 ENCOUNTER — RESULTS FOLLOW-UP (OUTPATIENT)
Dept: PRIMARY CARE CLINIC | Age: 35
End: 2025-03-18

## 2025-03-18 ENCOUNTER — OFFICE VISIT (OUTPATIENT)
Dept: PRIMARY CARE CLINIC | Age: 35
End: 2025-03-18
Payer: COMMERCIAL

## 2025-03-18 VITALS
OXYGEN SATURATION: 98 % | BODY MASS INDEX: 36.08 KG/M2 | WEIGHT: 229.9 LBS | HEART RATE: 90 BPM | TEMPERATURE: 98.2 F | HEIGHT: 67 IN | DIASTOLIC BLOOD PRESSURE: 80 MMHG | RESPIRATION RATE: 16 BRPM | SYSTOLIC BLOOD PRESSURE: 115 MMHG

## 2025-03-18 DIAGNOSIS — J02.9 SORE THROAT: Primary | ICD-10-CM

## 2025-03-18 LAB — S PYO AG THROAT QL: NORMAL

## 2025-03-18 PROCEDURE — G8417 CALC BMI ABV UP PARAM F/U: HCPCS | Performed by: FAMILY MEDICINE

## 2025-03-18 PROCEDURE — 1036F TOBACCO NON-USER: CPT | Performed by: FAMILY MEDICINE

## 2025-03-18 PROCEDURE — G8427 DOCREV CUR MEDS BY ELIG CLIN: HCPCS | Performed by: FAMILY MEDICINE

## 2025-03-18 PROCEDURE — 87880 STREP A ASSAY W/OPTIC: CPT | Performed by: FAMILY MEDICINE

## 2025-03-18 PROCEDURE — 99214 OFFICE O/P EST MOD 30 MIN: CPT | Performed by: FAMILY MEDICINE

## 2025-03-18 RX ORDER — LEVOFLOXACIN 750 MG/1
750 TABLET, FILM COATED ORAL DAILY
Qty: 5 TABLET | Refills: 0 | Status: SHIPPED | OUTPATIENT
Start: 2025-03-18 | End: 2025-03-23

## 2025-03-18 RX ORDER — PREDNISONE 20 MG/1
20 TABLET ORAL DAILY
Qty: 5 TABLET | Refills: 0 | Status: SHIPPED | OUTPATIENT
Start: 2025-03-18 | End: 2025-03-23

## 2025-03-18 SDOH — ECONOMIC STABILITY: FOOD INSECURITY: WITHIN THE PAST 12 MONTHS, YOU WORRIED THAT YOUR FOOD WOULD RUN OUT BEFORE YOU GOT MONEY TO BUY MORE.: NEVER TRUE

## 2025-03-18 SDOH — ECONOMIC STABILITY: FOOD INSECURITY: WITHIN THE PAST 12 MONTHS, THE FOOD YOU BOUGHT JUST DIDN'T LAST AND YOU DIDN'T HAVE MONEY TO GET MORE.: NEVER TRUE

## 2025-03-18 ASSESSMENT — PATIENT HEALTH QUESTIONNAIRE - PHQ9
10. IF YOU CHECKED OFF ANY PROBLEMS, HOW DIFFICULT HAVE THESE PROBLEMS MADE IT FOR YOU TO DO YOUR WORK, TAKE CARE OF THINGS AT HOME, OR GET ALONG WITH OTHER PEOPLE: SOMEWHAT DIFFICULT
7. TROUBLE CONCENTRATING ON THINGS, SUCH AS READING THE NEWSPAPER OR WATCHING TELEVISION: NOT AT ALL
4. FEELING TIRED OR HAVING LITTLE ENERGY: SEVERAL DAYS
7. TROUBLE CONCENTRATING ON THINGS, SUCH AS READING THE NEWSPAPER OR WATCHING TELEVISION: NOT AT ALL
SUM OF ALL RESPONSES TO PHQ QUESTIONS 1-9: 5
2. FEELING DOWN, DEPRESSED OR HOPELESS: SEVERAL DAYS
10. IF YOU CHECKED OFF ANY PROBLEMS, HOW DIFFICULT HAVE THESE PROBLEMS MADE IT FOR YOU TO DO YOUR WORK, TAKE CARE OF THINGS AT HOME, OR GET ALONG WITH OTHER PEOPLE: SOMEWHAT DIFFICULT
5. POOR APPETITE OR OVEREATING: SEVERAL DAYS
SUM OF ALL RESPONSES TO PHQ QUESTIONS 1-9: 5
8. MOVING OR SPEAKING SO SLOWLY THAT OTHER PEOPLE COULD HAVE NOTICED. OR THE OPPOSITE - BEING SO FIDGETY OR RESTLESS THAT YOU HAVE BEEN MOVING AROUND A LOT MORE THAN USUAL: SEVERAL DAYS
4. FEELING TIRED OR HAVING LITTLE ENERGY: SEVERAL DAYS
2. FEELING DOWN, DEPRESSED OR HOPELESS: SEVERAL DAYS
8. MOVING OR SPEAKING SO SLOWLY THAT OTHER PEOPLE COULD HAVE NOTICED. OR THE OPPOSITE, BEING SO FIGETY OR RESTLESS THAT YOU HAVE BEEN MOVING AROUND A LOT MORE THAN USUAL: SEVERAL DAYS
5. POOR APPETITE OR OVEREATING: SEVERAL DAYS
1. LITTLE INTEREST OR PLEASURE IN DOING THINGS: NOT AT ALL
6. FEELING BAD ABOUT YOURSELF - OR THAT YOU ARE A FAILURE OR HAVE LET YOURSELF OR YOUR FAMILY DOWN: NOT AT ALL
SUM OF ALL RESPONSES TO PHQ QUESTIONS 1-9: 5
9. THOUGHTS THAT YOU WOULD BE BETTER OFF DEAD, OR OF HURTING YOURSELF: NOT AT ALL
3. TROUBLE FALLING OR STAYING ASLEEP: SEVERAL DAYS
1. LITTLE INTEREST OR PLEASURE IN DOING THINGS: NOT AT ALL
3. TROUBLE FALLING OR STAYING ASLEEP: SEVERAL DAYS
9. THOUGHTS THAT YOU WOULD BE BETTER OFF DEAD, OR OF HURTING YOURSELF: NOT AT ALL
SUM OF ALL RESPONSES TO PHQ QUESTIONS 1-9: 5
SUM OF ALL RESPONSES TO PHQ QUESTIONS 1-9: 5
6. FEELING BAD ABOUT YOURSELF - OR THAT YOU ARE A FAILURE OR HAVE LET YOURSELF OR YOUR FAMILY DOWN: NOT AT ALL

## 2025-03-18 NOTE — PROGRESS NOTES
Assessment and Plan:   1. Sore throat  - POCT rapid strep A  - levoFLOXacin (LEVAQUIN) 750 MG tablet; Take 1 tablet by mouth daily for 5 days  Dispense: 5 tablet; Refill: 0  - predniSONE (DELTASONE) 20 MG tablet; Take 1 tablet by mouth daily for 5 days  Dispense: 5 tablet; Refill: 0           This chart note was prepared using a voice recognition dictation program. This note was reviewed for accuracy; however, addition, deletion and sound-alike word errors may occur. If there are any questions regarding this chart note, please contact the originating provider.    Electronically signed by   Judy Noel MD  3/18/2025   11:24 AM    No follow-ups on file.

## 2025-03-19 ASSESSMENT — ENCOUNTER SYMPTOMS
COUGH: 1
DIARRHEA: 0
CHEST TIGHTNESS: 0
SHORTNESS OF BREATH: 0
ABDOMINAL PAIN: 0
CONSTIPATION: 0
RHINORRHEA: 0
SORE THROAT: 0

## 2025-03-24 ENCOUNTER — PATIENT MESSAGE (OUTPATIENT)
Dept: PRIMARY CARE CLINIC | Age: 35
End: 2025-03-24

## 2025-03-24 ENCOUNTER — TELEMEDICINE (OUTPATIENT)
Dept: PSYCHOLOGY | Age: 35
End: 2025-03-24
Payer: COMMERCIAL

## 2025-03-24 DIAGNOSIS — F33.0 MILD EPISODE OF RECURRENT MAJOR DEPRESSIVE DISORDER: ICD-10-CM

## 2025-03-24 DIAGNOSIS — F41.1 GENERALIZED ANXIETY DISORDER: Primary | ICD-10-CM

## 2025-03-24 PROCEDURE — 90832 PSYTX W PT 30 MINUTES: CPT | Performed by: PSYCHOLOGIST

## 2025-03-24 NOTE — PROGRESS NOTES
Behavioral Health Consultation  Victoria Chen Psy.D.  Psychologist  3/24/2025      Time spent with Patient: 25 minutes  This is patient's  42nd  [facility] appointment.    Reason for Consult:    Chief Complaint   Patient presents with    Anxiety     Referring Provider: Judy Noel MD  [address] [place] [address]  [place] [address]    Feedback given to referring provider.    TELEHEALTH VISIT -- Audio/Visual  Anthony ELLEN Bradford, was evaluated through a synchronous (real-time) audio-video encounter. The patient (or guardian if applicable) is aware that this is a billable service, which includes applicable co-pays. This Virtual Visit was conducted with patient's (and/or legal guardian's) consent.  Patient identification was verified, and a caregiver was present when appropriate. The patient was located in a state where the provider was licensed to provide care.     Conducted a risk-benefit analysis and determined that the patient's presenting problems are consistent with the use of telepsychology. Determined that the patient and/or guardian has sufficient knowledge and skills in the use of technology enabling them to adequately benefit from telepsychology. It was determined that this patient was able to be properly treated without an in-person session. Patient or guardian verified that they were currently located at the [place] address that was provided during registration. Discussed consent form for telehealth and patient or guardian provided verbal consent.    Verified the following information:  Patient's identification: Yes  Patient location: [address] [place] [address]   Patient's call back number: [phone]   Patient's emergency contact's name and number, as well as permission to contact them if needed: Extended Emergency Contact Information  Primary Emergency Contact: Sourav Bradford  Address: [address] [place] [id number] [facility]  Home Phone: [phone]  Mobile Phone:

## 2025-03-25 RX ORDER — METHYLPREDNISOLONE 4 MG/1
TABLET ORAL
Qty: 1 KIT | Refills: 0 | Status: SHIPPED | OUTPATIENT
Start: 2025-03-25 | End: 2025-03-31

## 2025-04-07 ENCOUNTER — TELEMEDICINE (OUTPATIENT)
Dept: PSYCHOLOGY | Age: 35
End: 2025-04-07
Payer: COMMERCIAL

## 2025-04-07 DIAGNOSIS — F41.1 GENERALIZED ANXIETY DISORDER: Primary | ICD-10-CM

## 2025-04-07 DIAGNOSIS — F33.0 MILD EPISODE OF RECURRENT MAJOR DEPRESSIVE DISORDER: ICD-10-CM

## 2025-04-07 PROCEDURE — 90832 PSYTX W PT 30 MINUTES: CPT | Performed by: PSYCHOLOGIST

## 2025-04-07 NOTE — PROGRESS NOTES
coping. Discussed body image concerns using CBT strategies.     Pt Behavioral Change Plan:   See above

## 2025-04-21 ENCOUNTER — TELEMEDICINE (OUTPATIENT)
Dept: PSYCHOLOGY | Age: 35
End: 2025-04-21
Payer: COMMERCIAL

## 2025-04-21 DIAGNOSIS — F41.1 GENERALIZED ANXIETY DISORDER: Primary | ICD-10-CM

## 2025-04-21 DIAGNOSIS — F33.0 MILD EPISODE OF RECURRENT MAJOR DEPRESSIVE DISORDER: ICD-10-CM

## 2025-04-21 PROCEDURE — 90832 PSYTX W PT 30 MINUTES: CPT | Performed by: PSYCHOLOGIST

## 2025-04-21 NOTE — PROGRESS NOTES
Behavioral Health Consultation  Victoria Chen Psy.D.  Psychologist  4/21/2025      Time spent with Patient: 25 minutes  This is patient's  44th  [facility] appointment.    Reason for Consult:    Chief Complaint   Patient presents with    Anxiety     Referring Provider: Judy Noel MD  [address] [place] [address]  [address] [place] [address]    Feedback given to referring provider.    TELEHEALTH VISIT -- Audio/Visual  Anthony ELLEN Bradford, was evaluated through a synchronous (real-time) audio-video encounter. The patient (or guardian if applicable) is aware that this is a billable service, which includes applicable co-pays. This Virtual Visit was conducted with patient's (and/or legal guardian's) consent.  Patient identification was verified, and a caregiver was present when appropriate. The patient was located in a state where the provider was licensed to provide care.     Conducted a risk-benefit analysis and determined that the patient's presenting problems are consistent with the use of telepsychology. Determined that the patient and/or guardian has sufficient knowledge and skills in the use of technology enabling them to adequately benefit from telepsychology. It was determined that this patient was able to be properly treated without an in-person session. Patient or guardian verified that they were currently located at the [place] address that was provided during registration. Discussed consent form for telehealth and patient or guardian provided verbal consent.    Verified the following information:  Patient's identification: Yes  Patient location: [address] [place] [address]   Patient's call back number: [phone]   Patient's emergency contact's name and number, as well as permission to contact them if needed: Extended Emergency Contact Information  Primary Emergency Contact: Sourav Bradford  Address: [address] [place] [address] [facility] of [facility]  Home Phone: [phone]  Mobile Phone:

## 2025-05-05 ENCOUNTER — TELEMEDICINE (OUTPATIENT)
Dept: PSYCHOLOGY | Age: 35
End: 2025-05-05
Payer: COMMERCIAL

## 2025-05-05 DIAGNOSIS — F33.0 MILD EPISODE OF RECURRENT MAJOR DEPRESSIVE DISORDER: ICD-10-CM

## 2025-05-05 DIAGNOSIS — F41.1 GENERALIZED ANXIETY DISORDER: Primary | ICD-10-CM

## 2025-05-05 PROCEDURE — 90832 PSYTX W PT 30 MINUTES: CPT | Performed by: PSYCHOLOGIST

## 2025-05-07 RX ORDER — SCOPOLAMINE 1 MG/3D
1 PATCH, EXTENDED RELEASE TRANSDERMAL
Qty: 5 PATCH | Refills: 0 | Status: SHIPPED | OUTPATIENT
Start: 2025-05-07

## 2025-05-19 ENCOUNTER — TELEMEDICINE (OUTPATIENT)
Dept: PSYCHOLOGY | Age: 35
End: 2025-05-19
Payer: COMMERCIAL

## 2025-05-19 DIAGNOSIS — F41.1 GENERALIZED ANXIETY DISORDER: Primary | ICD-10-CM

## 2025-05-19 DIAGNOSIS — F33.0 MILD EPISODE OF RECURRENT MAJOR DEPRESSIVE DISORDER: ICD-10-CM

## 2025-05-19 PROCEDURE — 90832 PSYTX W PT 30 MINUTES: CPT | Performed by: PSYCHOLOGIST

## 2025-05-19 ASSESSMENT — PATIENT HEALTH QUESTIONNAIRE - PHQ9
5. POOR APPETITE OR OVEREATING: SEVERAL DAYS
1. LITTLE INTEREST OR PLEASURE IN DOING THINGS: NOT AT ALL
2. FEELING DOWN, DEPRESSED OR HOPELESS: NOT AT ALL
3. TROUBLE FALLING OR STAYING ASLEEP: SEVERAL DAYS
3. TROUBLE FALLING OR STAYING ASLEEP: SEVERAL DAYS
SUM OF ALL RESPONSES TO PHQ QUESTIONS 1-9: 7
5. POOR APPETITE OR OVEREATING: SEVERAL DAYS
10. IF YOU CHECKED OFF ANY PROBLEMS, HOW DIFFICULT HAVE THESE PROBLEMS MADE IT FOR YOU TO DO YOUR WORK, TAKE CARE OF THINGS AT HOME, OR GET ALONG WITH OTHER PEOPLE: SOMEWHAT DIFFICULT
4. FEELING TIRED OR HAVING LITTLE ENERGY: NEARLY EVERY DAY
SUM OF ALL RESPONSES TO PHQ QUESTIONS 1-9: 7
6. FEELING BAD ABOUT YOURSELF - OR THAT YOU ARE A FAILURE OR HAVE LET YOURSELF OR YOUR FAMILY DOWN: NOT AT ALL
8. MOVING OR SPEAKING SO SLOWLY THAT OTHER PEOPLE COULD HAVE NOTICED. OR THE OPPOSITE, BEING SO FIGETY OR RESTLESS THAT YOU HAVE BEEN MOVING AROUND A LOT MORE THAN USUAL: NOT AT ALL
SUM OF ALL RESPONSES TO PHQ QUESTIONS 1-9: 7
7. TROUBLE CONCENTRATING ON THINGS, SUCH AS READING THE NEWSPAPER OR WATCHING TELEVISION: MORE THAN HALF THE DAYS
2. FEELING DOWN, DEPRESSED OR HOPELESS: NOT AT ALL
8. MOVING OR SPEAKING SO SLOWLY THAT OTHER PEOPLE COULD HAVE NOTICED. OR THE OPPOSITE - BEING SO FIDGETY OR RESTLESS THAT YOU HAVE BEEN MOVING AROUND A LOT MORE THAN USUAL: NOT AT ALL
1. LITTLE INTEREST OR PLEASURE IN DOING THINGS: NOT AT ALL
7. TROUBLE CONCENTRATING ON THINGS, SUCH AS READING THE NEWSPAPER OR WATCHING TELEVISION: MORE THAN HALF THE DAYS
9. THOUGHTS THAT YOU WOULD BE BETTER OFF DEAD, OR OF HURTING YOURSELF: NOT AT ALL
10. IF YOU CHECKED OFF ANY PROBLEMS, HOW DIFFICULT HAVE THESE PROBLEMS MADE IT FOR YOU TO DO YOUR WORK, TAKE CARE OF THINGS AT HOME, OR GET ALONG WITH OTHER PEOPLE: SOMEWHAT DIFFICULT
SUM OF ALL RESPONSES TO PHQ QUESTIONS 1-9: 7
SUM OF ALL RESPONSES TO PHQ QUESTIONS 1-9: 7
9. THOUGHTS THAT YOU WOULD BE BETTER OFF DEAD, OR OF HURTING YOURSELF: NOT AT ALL
6. FEELING BAD ABOUT YOURSELF - OR THAT YOU ARE A FAILURE OR HAVE LET YOURSELF OR YOUR FAMILY DOWN: NOT AT ALL
4. FEELING TIRED OR HAVING LITTLE ENERGY: NEARLY EVERY DAY

## 2025-05-19 ASSESSMENT — ANXIETY QUESTIONNAIRES
5. BEING SO RESTLESS THAT IT IS HARD TO SIT STILL: NOT AT ALL
2. NOT BEING ABLE TO STOP OR CONTROL WORRYING: SEVERAL DAYS
1. FEELING NERVOUS, ANXIOUS, OR ON EDGE: MORE THAN HALF THE DAYS
6. BECOMING EASILY ANNOYED OR IRRITABLE: SEVERAL DAYS
5. BEING SO RESTLESS THAT IT IS HARD TO SIT STILL: NOT AT ALL
7. FEELING AFRAID AS IF SOMETHING AWFUL MIGHT HAPPEN: SEVERAL DAYS
3. WORRYING TOO MUCH ABOUT DIFFERENT THINGS: SEVERAL DAYS
IF YOU CHECKED OFF ANY PROBLEMS ON THIS QUESTIONNAIRE, HOW DIFFICULT HAVE THESE PROBLEMS MADE IT FOR YOU TO DO YOUR WORK, TAKE CARE OF THINGS AT HOME, OR GET ALONG WITH OTHER PEOPLE: SOMEWHAT DIFFICULT
IF YOU CHECKED OFF ANY PROBLEMS ON THIS QUESTIONNAIRE, HOW DIFFICULT HAVE THESE PROBLEMS MADE IT FOR YOU TO DO YOUR WORK, TAKE CARE OF THINGS AT HOME, OR GET ALONG WITH OTHER PEOPLE: SOMEWHAT DIFFICULT
4. TROUBLE RELAXING: SEVERAL DAYS
2. NOT BEING ABLE TO STOP OR CONTROL WORRYING: SEVERAL DAYS
1. FEELING NERVOUS, ANXIOUS, OR ON EDGE: MORE THAN HALF THE DAYS
3. WORRYING TOO MUCH ABOUT DIFFERENT THINGS: SEVERAL DAYS
7. FEELING AFRAID AS IF SOMETHING AWFUL MIGHT HAPPEN: SEVERAL DAYS
6. BECOMING EASILY ANNOYED OR IRRITABLE: SEVERAL DAYS
4. TROUBLE RELAXING: SEVERAL DAYS
GAD7 TOTAL SCORE: 7

## 2025-05-19 NOTE — PROGRESS NOTES
Behavioral Health Consultation  Victoria Chen Psy.D.  Psychologist  5/19/2025      Time spent with Patient: 25 minutes  This is patient's  46th  [facility] [place] [facility] appointment.    Reason for Consult:    Chief Complaint   Patient presents with    Anxiety    Depression     Referring Provider: Judy Noel MD  [address] [facility]  [address]  [place] [address]    Feedback given to referring provider.    TELEHEALTH VISIT -- Audio/Visual  Anthony ELLEN Bradford, was evaluated through a synchronous (real-time) audio-video encounter. The patient (or guardian if applicable) is aware that this is a billable service, which includes applicable co-pays. This Virtual Visit was conducted with patient's (and/or legal guardian's) consent.  Patient identification was verified, and a caregiver was present when appropriate. The patient was located in a state where the provider was licensed to provide care.     Conducted a risk-benefit analysis and determined that the patient's presenting problems are consistent with the use of telepsychology. Determined that the patient and/or guardian has sufficient knowledge and skills in the use of technology enabling them to adequately benefit from telepsychology. It was determined that this patient was able to be properly treated without an in-person session. Patient or guardian verified that they were currently located at the [place] address that was provided during registration. Discussed consent form for telehealth and patient or guardian provided verbal consent.    Verified the following information:  Patient's identification: Yes  Patient location: [address] [place] [address]  Patient's call back number: [phone]   Patient's emergency contact's name and number, as well as permission to contact them if needed: Extended Emergency Contact Information  Primary Emergency Contact: Sourav Bradford  Address: [address] [place] [address] [facility]  Home Phone: [phone]  Mobile

## 2025-05-21 ENCOUNTER — OFFICE VISIT (OUTPATIENT)
Dept: PRIMARY CARE CLINIC | Age: 35
End: 2025-05-21
Payer: COMMERCIAL

## 2025-05-21 VITALS
OXYGEN SATURATION: 98 % | DIASTOLIC BLOOD PRESSURE: 85 MMHG | TEMPERATURE: 97.8 F | BODY MASS INDEX: 37.43 KG/M2 | SYSTOLIC BLOOD PRESSURE: 129 MMHG | WEIGHT: 238.5 LBS | HEART RATE: 87 BPM | HEIGHT: 67 IN

## 2025-05-21 DIAGNOSIS — R63.5 WEIGHT GAIN: ICD-10-CM

## 2025-05-21 DIAGNOSIS — J40 BRONCHITIS: Primary | ICD-10-CM

## 2025-05-21 PROCEDURE — G8417 CALC BMI ABV UP PARAM F/U: HCPCS | Performed by: FAMILY MEDICINE

## 2025-05-21 PROCEDURE — 99214 OFFICE O/P EST MOD 30 MIN: CPT | Performed by: FAMILY MEDICINE

## 2025-05-21 PROCEDURE — 1036F TOBACCO NON-USER: CPT | Performed by: FAMILY MEDICINE

## 2025-05-21 PROCEDURE — G8427 DOCREV CUR MEDS BY ELIG CLIN: HCPCS | Performed by: FAMILY MEDICINE

## 2025-05-21 RX ORDER — PREDNISONE 20 MG/1
20 TABLET ORAL DAILY
Qty: 5 TABLET | Refills: 0 | Status: SHIPPED | OUTPATIENT
Start: 2025-05-21 | End: 2025-05-26

## 2025-05-21 ASSESSMENT — ENCOUNTER SYMPTOMS
SORE THROAT: 0
WHEEZING: 0
RHINORRHEA: 0
ABDOMINAL PAIN: 0
CONSTIPATION: 0
DIARRHEA: 0
COUGH: 1
SHORTNESS OF BREATH: 0
CHEST TIGHTNESS: 0

## 2025-05-21 NOTE — PROGRESS NOTES
Subjective:   Patient ID: Anthony Bradford is a 35 y.o. female.  HPI by clinical support staff:   Chief Complaint   Patient presents with    Cough     Pt. Present for painful cough, runny nose since saturday and feeling of wet ears. No fever.      Preliminary data above this line collected by clinical support staff.    ______________________________________________________________________  HPI by Provider:   HPI   Patient presents today with complaint of a cough, runny nose chest congestion and pressure in her ears ongoing for the last 5 days.  Denies any fever, myalgia or headaches.  Will be going out of town on a trip and wanted to be evaluated before she leaves.  Patient has also noticed a significant weight gain recently as well as a spike in her blood pressure.  States she has been walking daily.   Data above this line collected by Provider.    Patient's medications, allergies, past medical, surgical, social and family histories were reviewed and updated as appropriate.  Patient Care Team:  Judy Noel MD as PCP - General (Family Medicine)  Judy Noel MD as PCP - Empaneled Provider  Current Outpatient Medications on File Prior to Visit   Medication Sig Dispense Refill    scopolamine (TRANSDERM-SCOP) transdermal patch Place 1 patch onto the skin every 72 hours 5 patch 0    sertraline (ZOLOFT) 100 MG tablet Take 1 tablet by mouth daily 90 tablet 1    Multiple Vitamins-Minerals (HAIR SKIN & NAILS PO) Take by mouth      norethindrone (MICRONOR) 0.35 MG tablet Take 1 tablet by mouth daily      Prenatal MV-Min-Fe Fum-FA-DHA (PRENATAL 1 PO) Take by mouth      fluticasone (FLONASE) 50 MCG/ACT nasal spray 2 sprays by Nasal route       No current facility-administered medications on file prior to visit.     Review of Systems   Constitutional:  Positive for appetite change and fatigue. Negative for activity change and fever.   HENT:  Positive for congestion. Negative for rhinorrhea and sore throat.

## 2025-06-02 ENCOUNTER — TELEMEDICINE (OUTPATIENT)
Dept: PSYCHOLOGY | Age: 35
End: 2025-06-02
Payer: COMMERCIAL

## 2025-06-02 DIAGNOSIS — F33.0 MILD EPISODE OF RECURRENT MAJOR DEPRESSIVE DISORDER: ICD-10-CM

## 2025-06-02 DIAGNOSIS — F41.1 GENERALIZED ANXIETY DISORDER: Primary | ICD-10-CM

## 2025-06-02 PROCEDURE — 90832 PSYTX W PT 30 MINUTES: CPT | Performed by: PSYCHOLOGIST

## 2025-06-02 ASSESSMENT — ANXIETY QUESTIONNAIRES
3. WORRYING TOO MUCH ABOUT DIFFERENT THINGS: SEVERAL DAYS
2. NOT BEING ABLE TO STOP OR CONTROL WORRYING: SEVERAL DAYS
7. FEELING AFRAID AS IF SOMETHING AWFUL MIGHT HAPPEN: NOT AT ALL
1. FEELING NERVOUS, ANXIOUS, OR ON EDGE: SEVERAL DAYS
IF YOU CHECKED OFF ANY PROBLEMS ON THIS QUESTIONNAIRE, HOW DIFFICULT HAVE THESE PROBLEMS MADE IT FOR YOU TO DO YOUR WORK, TAKE CARE OF THINGS AT HOME, OR GET ALONG WITH OTHER PEOPLE: SOMEWHAT DIFFICULT
IF YOU CHECKED OFF ANY PROBLEMS ON THIS QUESTIONNAIRE, HOW DIFFICULT HAVE THESE PROBLEMS MADE IT FOR YOU TO DO YOUR WORK, TAKE CARE OF THINGS AT HOME, OR GET ALONG WITH OTHER PEOPLE: SOMEWHAT DIFFICULT
4. TROUBLE RELAXING: SEVERAL DAYS
7. FEELING AFRAID AS IF SOMETHING AWFUL MIGHT HAPPEN: NOT AT ALL
2. NOT BEING ABLE TO STOP OR CONTROL WORRYING: SEVERAL DAYS
3. WORRYING TOO MUCH ABOUT DIFFERENT THINGS: SEVERAL DAYS
6. BECOMING EASILY ANNOYED OR IRRITABLE: NOT AT ALL
6. BECOMING EASILY ANNOYED OR IRRITABLE: NOT AT ALL
4. TROUBLE RELAXING: SEVERAL DAYS
1. FEELING NERVOUS, ANXIOUS, OR ON EDGE: SEVERAL DAYS

## 2025-06-02 ASSESSMENT — PATIENT HEALTH QUESTIONNAIRE - PHQ9
8. MOVING OR SPEAKING SO SLOWLY THAT OTHER PEOPLE COULD HAVE NOTICED. OR THE OPPOSITE - BEING SO FIDGETY OR RESTLESS THAT YOU HAVE BEEN MOVING AROUND A LOT MORE THAN USUAL: NOT AT ALL
5. POOR APPETITE OR OVEREATING: SEVERAL DAYS
6. FEELING BAD ABOUT YOURSELF - OR THAT YOU ARE A FAILURE OR HAVE LET YOURSELF OR YOUR FAMILY DOWN: SEVERAL DAYS
7. TROUBLE CONCENTRATING ON THINGS, SUCH AS READING THE NEWSPAPER OR WATCHING TELEVISION: SEVERAL DAYS
7. TROUBLE CONCENTRATING ON THINGS, SUCH AS READING THE NEWSPAPER OR WATCHING TELEVISION: SEVERAL DAYS
2. FEELING DOWN, DEPRESSED OR HOPELESS: SEVERAL DAYS
6. FEELING BAD ABOUT YOURSELF - OR THAT YOU ARE A FAILURE OR HAVE LET YOURSELF OR YOUR FAMILY DOWN: SEVERAL DAYS
3. TROUBLE FALLING OR STAYING ASLEEP: SEVERAL DAYS
4. FEELING TIRED OR HAVING LITTLE ENERGY: SEVERAL DAYS
SUM OF ALL RESPONSES TO PHQ QUESTIONS 1-9: 6
9. THOUGHTS THAT YOU WOULD BE BETTER OFF DEAD, OR OF HURTING YOURSELF: NOT AT ALL
10. IF YOU CHECKED OFF ANY PROBLEMS, HOW DIFFICULT HAVE THESE PROBLEMS MADE IT FOR YOU TO DO YOUR WORK, TAKE CARE OF THINGS AT HOME, OR GET ALONG WITH OTHER PEOPLE: SOMEWHAT DIFFICULT
1. LITTLE INTEREST OR PLEASURE IN DOING THINGS: NOT AT ALL
10. IF YOU CHECKED OFF ANY PROBLEMS, HOW DIFFICULT HAVE THESE PROBLEMS MADE IT FOR YOU TO DO YOUR WORK, TAKE CARE OF THINGS AT HOME, OR GET ALONG WITH OTHER PEOPLE: SOMEWHAT DIFFICULT
3. TROUBLE FALLING OR STAYING ASLEEP: SEVERAL DAYS
SUM OF ALL RESPONSES TO PHQ QUESTIONS 1-9: 6
2. FEELING DOWN, DEPRESSED OR HOPELESS: SEVERAL DAYS
9. THOUGHTS THAT YOU WOULD BE BETTER OFF DEAD, OR OF HURTING YOURSELF: NOT AT ALL
1. LITTLE INTEREST OR PLEASURE IN DOING THINGS: NOT AT ALL
4. FEELING TIRED OR HAVING LITTLE ENERGY: SEVERAL DAYS
SUM OF ALL RESPONSES TO PHQ QUESTIONS 1-9: 6
8. MOVING OR SPEAKING SO SLOWLY THAT OTHER PEOPLE COULD HAVE NOTICED. OR THE OPPOSITE, BEING SO FIGETY OR RESTLESS THAT YOU HAVE BEEN MOVING AROUND A LOT MORE THAN USUAL: NOT AT ALL
SUM OF ALL RESPONSES TO PHQ QUESTIONS 1-9: 6
SUM OF ALL RESPONSES TO PHQ QUESTIONS 1-9: 6
5. POOR APPETITE OR OVEREATING: SEVERAL DAYS

## 2025-06-02 NOTE — PROGRESS NOTES
Behavioral Health Consultation  Victoria Chen Psy.D.  Psychologist  6/2/2025      Time spent with Patient: 25 minutes  This is patient's  47th  [facility] appointment.    Reason for Consult:    Chief Complaint   Patient presents with    Anxiety     Referring Provider: Judy Noel MD  [address] [facility]  [address]  [place] [id number]    Feedback given to referring provider.    TELEHEALTH VISIT -- Audio/Visual  Anthony ELLEN Bradford, was evaluated through a synchronous (real-time) audio-video encounter. The patient (or guardian if applicable) is aware that this is a billable service, which includes applicable co-pays. This Virtual Visit was conducted with patient's (and/or legal guardian's) consent.  Patient identification was verified, and a caregiver was present when appropriate. The patient was located in a state where the provider was licensed to provide care.     Conducted a risk-benefit analysis and determined that the patient's presenting problems are consistent with the use of telepsychology. Determined that the patient and/or guardian has sufficient knowledge and skills in the use of technology enabling them to adequately benefit from telepsychology. It was determined that this patient was able to be properly treated without an in-person session. Patient or guardian verified that they were currently located at the [place] address that was provided during registration. Discussed consent form for telehealth and patient or guardian provided verbal consent.    Verified the following information:  Patient's identification: Yes  Patient location: [address] [place] [address]   Patient's call back number: [phone]   Patient's emergency contact's name and number, as well as permission to contact them if needed: Extended Emergency Contact Information  Primary Emergency Contact: Sourav Bradford  Address: [address] [place] [address]  Home Phone:

## 2025-06-04 ENCOUNTER — OFFICE VISIT (OUTPATIENT)
Dept: PRIMARY CARE CLINIC | Age: 35
End: 2025-06-04
Payer: COMMERCIAL

## 2025-06-04 VITALS
DIASTOLIC BLOOD PRESSURE: 77 MMHG | HEIGHT: 67 IN | RESPIRATION RATE: 16 BRPM | OXYGEN SATURATION: 97 % | WEIGHT: 236.5 LBS | SYSTOLIC BLOOD PRESSURE: 117 MMHG | HEART RATE: 82 BPM | BODY MASS INDEX: 37.12 KG/M2 | TEMPERATURE: 98.3 F

## 2025-06-04 DIAGNOSIS — E66.09 CLASS 2 OBESITY DUE TO EXCESS CALORIES WITHOUT SERIOUS COMORBIDITY WITH BODY MASS INDEX (BMI) OF 37.0 TO 37.9 IN ADULT: Primary | ICD-10-CM

## 2025-06-04 DIAGNOSIS — N76.0 ACUTE VAGINITIS: ICD-10-CM

## 2025-06-04 DIAGNOSIS — E66.812 CLASS 2 OBESITY DUE TO EXCESS CALORIES WITHOUT SERIOUS COMORBIDITY WITH BODY MASS INDEX (BMI) OF 37.0 TO 37.9 IN ADULT: Primary | ICD-10-CM

## 2025-06-04 PROCEDURE — G8427 DOCREV CUR MEDS BY ELIG CLIN: HCPCS | Performed by: FAMILY MEDICINE

## 2025-06-04 PROCEDURE — G8417 CALC BMI ABV UP PARAM F/U: HCPCS | Performed by: FAMILY MEDICINE

## 2025-06-04 PROCEDURE — 99214 OFFICE O/P EST MOD 30 MIN: CPT | Performed by: FAMILY MEDICINE

## 2025-06-04 PROCEDURE — 1036F TOBACCO NON-USER: CPT | Performed by: FAMILY MEDICINE

## 2025-06-04 RX ORDER — FLUCONAZOLE 150 MG/1
150 TABLET ORAL
Qty: 2 TABLET | Refills: 0 | Status: SHIPPED | OUTPATIENT
Start: 2025-06-04 | End: 2025-06-10

## 2025-06-04 ASSESSMENT — ENCOUNTER SYMPTOMS
SHORTNESS OF BREATH: 0
RHINORRHEA: 0
SORE THROAT: 0
DIARRHEA: 0
ABDOMINAL PAIN: 0
CHEST TIGHTNESS: 0
CONSTIPATION: 0

## 2025-06-04 NOTE — PROGRESS NOTES
Subjective:   Patient ID: Anthony Bradford is a 35 y.o. female.  HPI by clinical support staff:   Chief Complaint   Patient presents with    Weight Management     Patient is present today for weight management follow up. She is open to discussing options     Blood Pressure Check    Vaginitis     Patient has been experiencing vaginal itching started on Friday       Preliminary data above this line collected by clinical support staff.    ______________________________________________________________________  HPI by Provider:   HPI   Patient presents today for follow-up on bronchitis states that has gotten much better but still has episodes of having to take deep breaths with the humidity outside.  Did noticed vaginal itching which started about 5 days ago and vaginal discharge yesterday after starting family care.  Would like to discuss weight loss options has been doing weight watchers and a walking challenge at work but has not been dedicated to this and plans to start doing so.  Is willing to discontinue breast-feeding if need be.   Data above this line collected by Provider.    Patient's medications, allergies, past medical, surgical, social and family histories were reviewed and updated as appropriate.  Patient Care Team:  Judy Noel MD as PCP - General (Family Medicine)  Judy Noel [facility]  Current Outpatient Medications on File Prior to Visit   Medication Sig Dispense Refill    albuterol sulfate HFA (VENTOLIN HFA) 108 (90 Base) MCG/ACT inhaler Inhale 2 puffs into the lungs every 4 hours as needed for Wheezing (Cough) 18 g 0    sertraline (ZOLOFT) 100 MG tablet Take 1 tablet by mouth daily 90 tablet 1    Multiple Vitamins-Minerals (HAIR SKIN & NAILS PO) Take by mouth      norethindrone (MICRONOR) 0.35 MG tablet Take 1 tablet by mouth daily      Prenatal MV-Min-Fe Fum-FA-DHA (PRENATAL 1 PO) Take by mouth      fluticasone (FLONASE) 50 MCG/ACT nasal spray 2 sprays by Nasal route

## 2025-06-05 ENCOUNTER — RESULTS FOLLOW-UP (OUTPATIENT)
Dept: PRIMARY CARE CLINIC | Age: 35
End: 2025-06-05

## 2025-06-05 LAB
BV BACTERIA DNA VAG QL NAA+PROBE: NOT DETECTED
C GLABRATA DNA VAG QL NAA+PROBE: NORMAL
C GLABRATA DNA VAG QL NAA+PROBE: NOT DETECTED
C KRUSEI DNA VAG QL NAA+PROBE: NOT DETECTED
CANDIDA DNA VAG QL NAA+PROBE: NOT DETECTED
T VAGINALIS DNA VAG QL NAA+PROBE: NOT DETECTED

## 2025-06-09 DIAGNOSIS — B96.89 ACUTE BACTERIAL SINUSITIS: Primary | ICD-10-CM

## 2025-06-09 DIAGNOSIS — J01.90 ACUTE BACTERIAL SINUSITIS: Primary | ICD-10-CM

## 2025-06-09 RX ORDER — METHYLPREDNISOLONE 4 MG/1
TABLET ORAL
Qty: 1 KIT | Refills: 0 | Status: SHIPPED | OUTPATIENT
Start: 2025-06-09 | End: 2025-06-15

## 2025-06-09 RX ORDER — DOXYCYCLINE HYCLATE 100 MG
100 TABLET ORAL 2 TIMES DAILY
Qty: 10 TABLET | Refills: 0 | Status: SHIPPED | OUTPATIENT
Start: 2025-06-09 | End: 2025-06-14

## 2025-06-11 DIAGNOSIS — F41.9 ANXIETY: ICD-10-CM

## 2025-06-11 DIAGNOSIS — F32.A MILD DEPRESSION: ICD-10-CM

## 2025-06-11 RX ORDER — SERTRALINE HYDROCHLORIDE 100 MG/1
100 TABLET, FILM COATED ORAL DAILY
Qty: 90 TABLET | Refills: 1 | OUTPATIENT
Start: 2025-06-11

## 2025-06-13 RX ORDER — ALBUTEROL SULFATE 90 UG/1
INHALANT RESPIRATORY (INHALATION)
Qty: 18 G | Refills: 0 | Status: SHIPPED | OUTPATIENT
Start: 2025-06-13

## 2025-06-13 NOTE — TELEPHONE ENCOUNTER
Medication:   Requested Prescriptions     Pending Prescriptions Disp Refills    albuterol sulfate HFA (PROVENTIL;VENTOLIN;PROAIR) 108 (90 Base) MCG/ACT inhaler [Pharmacy Med Name: ALBUTEROL HFA INH (200 PUFFS) 18GM] 18 g 0     Sig: INHALE 2 PUFFS INTO THE LUNGS EVERY 4 HOURS AS NEEDED FOR WHEEZING OR COUGH        Last Filled:  05/25/25 #18 g 0 refill    Patient Phone Number: [phone] (home)     Last appt: 6/4/2025   Next appt: 7/2/2025    Last OARRS:        No data to display

## 2025-06-22 ASSESSMENT — PATIENT HEALTH QUESTIONNAIRE - PHQ9
5. POOR APPETITE OR OVEREATING: NOT AT ALL
SUM OF ALL RESPONSES TO PHQ QUESTIONS 1-9: 3
SUM OF ALL RESPONSES TO PHQ QUESTIONS 1-9: 3
3. TROUBLE FALLING OR STAYING ASLEEP: NOT AT ALL
1. LITTLE INTEREST OR PLEASURE IN DOING THINGS: NOT AT ALL
1. LITTLE INTEREST OR PLEASURE IN DOING THINGS: NOT AT ALL
SUM OF ALL RESPONSES TO PHQ QUESTIONS 1-9: 3
3. TROUBLE FALLING OR STAYING ASLEEP: NOT AT ALL
10. IF YOU CHECKED OFF ANY PROBLEMS, HOW DIFFICULT HAVE THESE PROBLEMS MADE IT FOR YOU TO DO YOUR WORK, TAKE CARE OF THINGS AT HOME, OR GET ALONG WITH OTHER PEOPLE: SOMEWHAT DIFFICULT
6. FEELING BAD ABOUT YOURSELF - OR THAT YOU ARE A FAILURE OR HAVE LET YOURSELF OR YOUR FAMILY DOWN: NOT AT ALL
4. FEELING TIRED OR HAVING LITTLE ENERGY: NOT AT ALL
SUM OF ALL RESPONSES TO PHQ QUESTIONS 1-9: 3
2. FEELING DOWN, DEPRESSED OR HOPELESS: SEVERAL DAYS
4. FEELING TIRED OR HAVING LITTLE ENERGY: NOT AT ALL
SUM OF ALL RESPONSES TO PHQ QUESTIONS 1-9: 3
9. THOUGHTS THAT YOU WOULD BE BETTER OFF DEAD, OR OF HURTING YOURSELF: NOT AT ALL
10. IF YOU CHECKED OFF ANY PROBLEMS, HOW DIFFICULT HAVE THESE PROBLEMS MADE IT FOR YOU TO DO YOUR WORK, TAKE CARE OF THINGS AT HOME, OR GET ALONG WITH OTHER PEOPLE: SOMEWHAT DIFFICULT
9. THOUGHTS THAT YOU WOULD BE BETTER OFF DEAD, OR OF HURTING YOURSELF: NOT AT ALL
8. MOVING OR SPEAKING SO SLOWLY THAT OTHER PEOPLE COULD HAVE NOTICED. OR THE OPPOSITE - BEING SO FIDGETY OR RESTLESS THAT YOU HAVE BEEN MOVING AROUND A LOT MORE THAN USUAL: SEVERAL DAYS
7. TROUBLE CONCENTRATING ON THINGS, SUCH AS READING THE NEWSPAPER OR WATCHING TELEVISION: SEVERAL DAYS
5. POOR APPETITE OR OVEREATING: NOT AT ALL
2. FEELING DOWN, DEPRESSED OR HOPELESS: SEVERAL DAYS
8. MOVING OR SPEAKING SO SLOWLY THAT OTHER PEOPLE COULD HAVE NOTICED. OR THE OPPOSITE, BEING SO FIGETY OR RESTLESS THAT YOU HAVE BEEN MOVING AROUND A LOT MORE THAN USUAL: SEVERAL DAYS
7. TROUBLE CONCENTRATING ON THINGS, SUCH AS READING THE NEWSPAPER OR WATCHING TELEVISION: SEVERAL DAYS
6. FEELING BAD ABOUT YOURSELF - OR THAT YOU ARE A FAILURE OR HAVE LET YOURSELF OR YOUR FAMILY DOWN: NOT AT ALL

## 2025-06-22 ASSESSMENT — ANXIETY QUESTIONNAIRES
5. BEING SO RESTLESS THAT IT IS HARD TO SIT STILL: SEVERAL DAYS
IF YOU CHECKED OFF ANY PROBLEMS ON THIS QUESTIONNAIRE, HOW DIFFICULT HAVE THESE PROBLEMS MADE IT FOR YOU TO DO YOUR WORK, TAKE CARE OF THINGS AT HOME, OR GET ALONG WITH OTHER PEOPLE: SOMEWHAT DIFFICULT
6. BECOMING EASILY ANNOYED OR IRRITABLE: SEVERAL DAYS
7. FEELING AFRAID AS IF SOMETHING AWFUL MIGHT HAPPEN: NOT AT ALL
GAD7 TOTAL SCORE: 6
IF YOU CHECKED OFF ANY PROBLEMS ON THIS QUESTIONNAIRE, HOW DIFFICULT HAVE THESE PROBLEMS MADE IT FOR YOU TO DO YOUR WORK, TAKE CARE OF THINGS AT HOME, OR GET ALONG WITH OTHER PEOPLE: SOMEWHAT DIFFICULT
2. NOT BEING ABLE TO STOP OR CONTROL WORRYING: SEVERAL DAYS
5. BEING SO RESTLESS THAT IT IS HARD TO SIT STILL: SEVERAL DAYS
3. WORRYING TOO MUCH ABOUT DIFFERENT THINGS: SEVERAL DAYS
6. BECOMING EASILY ANNOYED OR IRRITABLE: SEVERAL DAYS
2. NOT BEING ABLE TO STOP OR CONTROL WORRYING: SEVERAL DAYS
4. TROUBLE RELAXING: SEVERAL DAYS
3. WORRYING TOO MUCH ABOUT DIFFERENT THINGS: SEVERAL DAYS
7. FEELING AFRAID AS IF SOMETHING AWFUL MIGHT HAPPEN: NOT AT ALL
1. FEELING NERVOUS, ANXIOUS, OR ON EDGE: SEVERAL DAYS
4. TROUBLE RELAXING: SEVERAL DAYS
1. FEELING NERVOUS, ANXIOUS, OR ON EDGE: SEVERAL DAYS

## 2025-06-23 ENCOUNTER — TELEMEDICINE (OUTPATIENT)
Dept: PSYCHOLOGY | Age: 35
End: 2025-06-23
Payer: COMMERCIAL

## 2025-06-23 DIAGNOSIS — F41.1 GENERALIZED ANXIETY DISORDER: Primary | ICD-10-CM

## 2025-06-23 DIAGNOSIS — F33.0 MILD EPISODE OF RECURRENT MAJOR DEPRESSIVE DISORDER: ICD-10-CM

## 2025-06-23 PROCEDURE — 90832 PSYTX W PT 30 MINUTES: CPT | Performed by: PSYCHOLOGIST

## 2025-06-23 NOTE — PROGRESS NOTES
Behavioral Health Consultation  Victoria Chen Psy.D.  Psychologist  6/23/2025      Time spent with Patient: 25 minutes  This is patient's 48th [facility] [place] [facility] appointment.    Reason for Consult:    Chief Complaint   Patient presents with    Anxiety    Depression     Referring Provider: Judy Noel MD  [address] [facility]  [address]  [place] [id number]    Feedback given to referring provider.    TELEHEALTH VISIT -- Audio/Visual  Anthony ELLEN Bradford, was evaluated through a synchronous (real-time) audio-video encounter. The patient (or guardian if applicable) is aware that this is a billable service, which includes applicable co-pays. This Virtual Visit was conducted with patient's (and/or legal guardian's) consent.  Patient identification was verified, and a caregiver was present when appropriate. The patient was located in a state where the provider was licensed to provide care.     Conducted a risk-benefit analysis and determined that the patient's presenting problems are consistent with the use of telepsychology. Determined that the patient and/or guardian has sufficient knowledge and skills in the use of technology enabling them to adequately benefit from telepsychology. It was determined that this patient was able to be properly treated without an in-person session. Patient or guardian verified that they were currently located at the [place] address that was provided during registration. Discussed consent form for telehealth and patient or guardian provided verbal consent.    Verified the following information:  Patient's identification: Yes  Patient location: [address] [place] [address]   Patient's call back number: [phone]   Patient's emergency contact's name and number, as well as permission to contact them if needed: Extended Emergency Contact Information  Primary Emergency Contact: Sourav Bradford  Address: [address] [place] [address]  Home Phone:

## 2025-06-24 DIAGNOSIS — H69.93 DYSFUNCTION OF BOTH EUSTACHIAN TUBES: Primary | ICD-10-CM

## 2025-06-26 ENCOUNTER — PATIENT MESSAGE (OUTPATIENT)
Dept: PRIMARY CARE CLINIC | Age: 35
End: 2025-06-26

## 2025-07-02 ENCOUNTER — OFFICE VISIT (OUTPATIENT)
Dept: PRIMARY CARE CLINIC | Age: 35
End: 2025-07-02
Payer: COMMERCIAL

## 2025-07-02 VITALS
SYSTOLIC BLOOD PRESSURE: 117 MMHG | HEIGHT: 67 IN | DIASTOLIC BLOOD PRESSURE: 79 MMHG | WEIGHT: 234.5 LBS | RESPIRATION RATE: 16 BRPM | OXYGEN SATURATION: 99 % | TEMPERATURE: 98.1 F | HEART RATE: 74 BPM | BODY MASS INDEX: 36.81 KG/M2

## 2025-07-02 DIAGNOSIS — Z30.011 INITIATION OF OCP (BCP): ICD-10-CM

## 2025-07-02 DIAGNOSIS — E66.09 CLASS 2 OBESITY DUE TO EXCESS CALORIES WITHOUT SERIOUS COMORBIDITY WITH BODY MASS INDEX (BMI) OF 36.0 TO 36.9 IN ADULT: Primary | ICD-10-CM

## 2025-07-02 DIAGNOSIS — E66.812 CLASS 2 OBESITY DUE TO EXCESS CALORIES WITHOUT SERIOUS COMORBIDITY WITH BODY MASS INDEX (BMI) OF 36.0 TO 36.9 IN ADULT: Primary | ICD-10-CM

## 2025-07-02 DIAGNOSIS — F32.A MILD DEPRESSION: ICD-10-CM

## 2025-07-02 PROCEDURE — G8427 DOCREV CUR MEDS BY ELIG CLIN: HCPCS | Performed by: FAMILY MEDICINE

## 2025-07-02 PROCEDURE — 1036F TOBACCO NON-USER: CPT | Performed by: FAMILY MEDICINE

## 2025-07-02 PROCEDURE — 99214 OFFICE O/P EST MOD 30 MIN: CPT | Performed by: FAMILY MEDICINE

## 2025-07-02 PROCEDURE — G8417 CALC BMI ABV UP PARAM F/U: HCPCS | Performed by: FAMILY MEDICINE

## 2025-07-02 RX ORDER — NORGESTIMATE AND ETHINYL ESTRADIOL 7DAYSX3 28
1 KIT ORAL DAILY
Qty: 3 PACKET | Refills: 1 | Status: SHIPPED | OUTPATIENT
Start: 2025-07-02

## 2025-07-02 NOTE — PROGRESS NOTES
Subjective:   Patient ID: Anthony Bradford is a 35 y.o. female here today to establish care.   HPI by clinical support staff:   Chief Complaint   Patient presents with    Weight Management     Patient is present today for weight management follow up. Last weight was 236lb and today is 234.5lb    Menstrual Cramps     Patient states she has been having menstrual cycles every 2-3 weeks. Patient has been having cramping and bloating this week, but has not started. She would also like to discuss switching birth control since she is no longer nursing.       Preliminary data above this line collected by clinical support staff.    ______________________________________________________________________  HPI by Provider:   HPI   Patient presents today for follow-up on weight management states she has been able to cut out soda completely but did drink some juice this week.  Had been doing well on her diet but has started eating more carbohydrates.  Has had a lot of stress in her life over the last few weeks due to the death and medical emergencies.  Sees her  once a week and has been walking almost every night.  Would like to switch her birth control since she is no longer breast-feeding.  Planning to potentially conceive within the next year.  Has been getting vivid dreams lately which is affecting the quality of her sleep.   Data above this line collected by Provider.    Patient's medications, allergies, past medical, surgical, social and family histories were reviewed and updated as appropriate.  Patient Care Team:  Judy Noel MD as PCP - General (Family Medicine)  Judy Noel MD as PCP - Empaneled Provider    Allergies   Allergen Reactions    Azithromycin Shortness Of Breath    Ibuprofen Nausea And Vomiting       Current Outpatient Medications on File Prior to Visit   Medication Sig Dispense Refill    sertraline (ZOLOFT) 100 MG tablet Take 1 tablet by mouth daily 90 tablet 1    Multiple

## 2025-07-02 NOTE — PATIENT INSTRUCTIONS
-500 calories less a day = 1lb weight loss a week.     Weight loss meds:   Saxenda,Tirzepatide, Ozempic, Phentermine, Topiramate,Contrave, Qysmia, Orlistat.

## 2025-07-03 RX ORDER — ALBUTEROL SULFATE 90 UG/1
INHALANT RESPIRATORY (INHALATION)
Qty: 18 G | Refills: 0 | Status: SHIPPED | OUTPATIENT
Start: 2025-07-03

## 2025-07-03 NOTE — TELEPHONE ENCOUNTER
Medication:   Requested Prescriptions     Pending Prescriptions Disp Refills    albuterol sulfate HFA (PROVENTIL;VENTOLIN;PROAIR) 108 (90 Base) MCG/ACT inhaler [Pharmacy Med Name: ALBUTEROL HFA INH (200 PUFFS) 18GM] 18 g 0     Sig: INHALE 2 PUFFS INTO THE LUNGS EVERY 4 HOURS AS NEEDED FOR WHEEZING OR COUGH        Last Filled:  6/13/25 0 refills    Patient Phone Number: [phone] (home)     Last appt: 7/2/2025   Next appt: 8/4/2025    Last OARRS:        No data to display

## 2025-07-06 ASSESSMENT — ANXIETY QUESTIONNAIRES
1. FEELING NERVOUS, ANXIOUS, OR ON EDGE: SEVERAL DAYS
2. NOT BEING ABLE TO STOP OR CONTROL WORRYING: SEVERAL DAYS
6. BECOMING EASILY ANNOYED OR IRRITABLE: SEVERAL DAYS
IF YOU CHECKED OFF ANY PROBLEMS ON THIS QUESTIONNAIRE, HOW DIFFICULT HAVE THESE PROBLEMS MADE IT FOR YOU TO DO YOUR WORK, TAKE CARE OF THINGS AT HOME, OR GET ALONG WITH OTHER PEOPLE: VERY DIFFICULT
4. TROUBLE RELAXING: SEVERAL DAYS
3. WORRYING TOO MUCH ABOUT DIFFERENT THINGS: SEVERAL DAYS
6. BECOMING EASILY ANNOYED OR IRRITABLE: SEVERAL DAYS
7. FEELING AFRAID AS IF SOMETHING AWFUL MIGHT HAPPEN: NOT AT ALL
5. BEING SO RESTLESS THAT IT IS HARD TO SIT STILL: NOT AT ALL
2. NOT BEING ABLE TO STOP OR CONTROL WORRYING: SEVERAL DAYS
3. WORRYING TOO MUCH ABOUT DIFFERENT THINGS: SEVERAL DAYS
IF YOU CHECKED OFF ANY PROBLEMS ON THIS QUESTIONNAIRE, HOW DIFFICULT HAVE THESE PROBLEMS MADE IT FOR YOU TO DO YOUR WORK, TAKE CARE OF THINGS AT HOME, OR GET ALONG WITH OTHER PEOPLE: VERY DIFFICULT
4. TROUBLE RELAXING: SEVERAL DAYS
7. FEELING AFRAID AS IF SOMETHING AWFUL MIGHT HAPPEN: NOT AT ALL
1. FEELING NERVOUS, ANXIOUS, OR ON EDGE: SEVERAL DAYS
5. BEING SO RESTLESS THAT IT IS HARD TO SIT STILL: NOT AT ALL
GAD7 TOTAL SCORE: 5

## 2025-07-06 ASSESSMENT — PATIENT HEALTH QUESTIONNAIRE - PHQ9
2. FEELING DOWN, DEPRESSED OR HOPELESS: SEVERAL DAYS
8. MOVING OR SPEAKING SO SLOWLY THAT OTHER PEOPLE COULD HAVE NOTICED. OR THE OPPOSITE, BEING SO FIGETY OR RESTLESS THAT YOU HAVE BEEN MOVING AROUND A LOT MORE THAN USUAL: SEVERAL DAYS
8. MOVING OR SPEAKING SO SLOWLY THAT OTHER PEOPLE COULD HAVE NOTICED. OR THE OPPOSITE - BEING SO FIDGETY OR RESTLESS THAT YOU HAVE BEEN MOVING AROUND A LOT MORE THAN USUAL: SEVERAL DAYS
3. TROUBLE FALLING OR STAYING ASLEEP: SEVERAL DAYS
4. FEELING TIRED OR HAVING LITTLE ENERGY: SEVERAL DAYS
7. TROUBLE CONCENTRATING ON THINGS, SUCH AS READING THE NEWSPAPER OR WATCHING TELEVISION: SEVERAL DAYS
10. IF YOU CHECKED OFF ANY PROBLEMS, HOW DIFFICULT HAVE THESE PROBLEMS MADE IT FOR YOU TO DO YOUR WORK, TAKE CARE OF THINGS AT HOME, OR GET ALONG WITH OTHER PEOPLE: VERY DIFFICULT
10. IF YOU CHECKED OFF ANY PROBLEMS, HOW DIFFICULT HAVE THESE PROBLEMS MADE IT FOR YOU TO DO YOUR WORK, TAKE CARE OF THINGS AT HOME, OR GET ALONG WITH OTHER PEOPLE: VERY DIFFICULT
1. LITTLE INTEREST OR PLEASURE IN DOING THINGS: NOT AT ALL
9. THOUGHTS THAT YOU WOULD BE BETTER OFF DEAD, OR OF HURTING YOURSELF: NOT AT ALL
7. TROUBLE CONCENTRATING ON THINGS, SUCH AS READING THE NEWSPAPER OR WATCHING TELEVISION: SEVERAL DAYS
2. FEELING DOWN, DEPRESSED OR HOPELESS: SEVERAL DAYS
SUM OF ALL RESPONSES TO PHQ QUESTIONS 1-9: 5
3. TROUBLE FALLING OR STAYING ASLEEP: SEVERAL DAYS
5. POOR APPETITE OR OVEREATING: NOT AT ALL
SUM OF ALL RESPONSES TO PHQ QUESTIONS 1-9: 5
6. FEELING BAD ABOUT YOURSELF - OR THAT YOU ARE A FAILURE OR HAVE LET YOURSELF OR YOUR FAMILY DOWN: NOT AT ALL
6. FEELING BAD ABOUT YOURSELF - OR THAT YOU ARE A FAILURE OR HAVE LET YOURSELF OR YOUR FAMILY DOWN: NOT AT ALL
5. POOR APPETITE OR OVEREATING: NOT AT ALL
9. THOUGHTS THAT YOU WOULD BE BETTER OFF DEAD, OR OF HURTING YOURSELF: NOT AT ALL
SUM OF ALL RESPONSES TO PHQ QUESTIONS 1-9: 5
4. FEELING TIRED OR HAVING LITTLE ENERGY: SEVERAL DAYS
1. LITTLE INTEREST OR PLEASURE IN DOING THINGS: NOT AT ALL

## 2025-07-08 ENCOUNTER — TELEMEDICINE (OUTPATIENT)
Dept: PSYCHOLOGY | Age: 35
End: 2025-07-08
Payer: COMMERCIAL

## 2025-07-08 DIAGNOSIS — F41.1 GENERALIZED ANXIETY DISORDER: Primary | ICD-10-CM

## 2025-07-08 DIAGNOSIS — F33.0 MILD EPISODE OF RECURRENT MAJOR DEPRESSIVE DISORDER: ICD-10-CM

## 2025-07-08 PROCEDURE — 90832 PSYTX W PT 30 MINUTES: CPT | Performed by: PSYCHOLOGIST

## 2025-07-08 NOTE — PROGRESS NOTES
Behavioral Health Consultation  Victoria Chen Psy.D.  Psychologist  7/8/2025      Time spent with Patient: 25 minutes  This is patient's [facility]th [facility] appointment.    Reason for Consult:    Chief Complaint   Patient presents with    Anxiety     Referring Provider: Judy Noel MD  [address] [place] [id number]    Feedback given to referring provider.    TELEHEALTH VISIT -- Audio/Visual  Anthony ELLEN Bradford, was evaluated through a synchronous (real-time) audio-video encounter. The patient (or guardian if applicable) is aware that this is a billable service, which includes applicable co-pays. This Virtual Visit was conducted with patient's (and/or legal guardian's) consent.  Patient identification was verified, and a caregiver was present when appropriate. The patient was located in a state where the provider was licensed to provide care.     Conducted a risk-benefit analysis and determined that the patient's presenting problems are consistent with the use of telepsychology. Determined that the patient and/or guardian has sufficient knowledge and skills in the use of technology enabling them to adequately benefit from telepsychology. It was determined that this patient was able to be properly treated without an in-person session. Patient or guardian verified that they were currently located at the [place] address that was provided during registration. Discussed consent form for telehealth and patient or guardian provided verbal consent.    Verified the following information:  Patient's identification: Yes  Patient location: [address] [place] [address]  Patient's call back number: [phone]   Patient's emergency contact's name and number, as well as permission to contact them if needed: Extended Emergency Contact Information  Primary Emergency Contact: Sourav Bradford  Address: [address] [place] [address] [facility]  Home Phone: [phone]  Mobile Phone:

## 2025-07-10 NOTE — PROGRESS NOTES
noise using recorded speech stimuli.    Tympanometry: Normal peak pressure and compliance, Type A tympanogram, consistent with normal middle ear function.       Reliability: Good   Transducer: Inserts    See scanned audiogram dated 7/14/2025 for results.        PATIENT EDUCATION:     The following items were discussed with the patient:   - Good Communication Strategies    Educational information was shared in the After Visit Summary.                                                RECOMMENDATIONS:                                                                                                                                                                                                                                                          The following items are recommended based on patient report and results from today's appointment:   - Continue medical follow-up with Yoav Akhtar MD   - Retest hearing as medically indicated and/or sooner if a change in hearing is noted.  - Utilize \"Good Communication Strategies\" as discussed to assist in speech understanding with communication partners.  - Use hearing protection devices (HPDs), such as protective ear muffs and ear plugs, when exposed to dangerous sound levels.    Doc Gutierrez  Audiologist     Chart CC'd to: Yoav Akhtar MD        Degree of   Hearing Sensitivity dB Range   Within Normal Limits (WNL) 0 - 20   Mild 20 - 40   Moderate 40 - 55   Moderately-Severe 55 - 70   Severe 70 - 90   Profound 90 +

## 2025-07-14 ENCOUNTER — PROCEDURE VISIT (OUTPATIENT)
Age: 35
End: 2025-07-14
Payer: COMMERCIAL

## 2025-07-14 ENCOUNTER — OFFICE VISIT (OUTPATIENT)
Age: 35
End: 2025-07-14
Payer: COMMERCIAL

## 2025-07-14 VITALS
HEART RATE: 71 BPM | OXYGEN SATURATION: 100 % | SYSTOLIC BLOOD PRESSURE: 115 MMHG | DIASTOLIC BLOOD PRESSURE: 82 MMHG | TEMPERATURE: 97 F

## 2025-07-14 DIAGNOSIS — H93.8X3 EAR PRESSURE, BILATERAL: Primary | ICD-10-CM

## 2025-07-14 DIAGNOSIS — J30.9 ALLERGIC RHINITIS, UNSPECIFIED SEASONALITY, UNSPECIFIED TRIGGER: ICD-10-CM

## 2025-07-14 DIAGNOSIS — J01.81 OTHER ACUTE RECURRENT SINUSITIS: ICD-10-CM

## 2025-07-14 DIAGNOSIS — H93.19 TINNITUS, UNSPECIFIED LATERALITY: Primary | ICD-10-CM

## 2025-07-14 DIAGNOSIS — J45.909 UNCOMPLICATED ASTHMA, UNSPECIFIED ASTHMA SEVERITY, UNSPECIFIED WHETHER PERSISTENT: ICD-10-CM

## 2025-07-14 PROCEDURE — G8417 CALC BMI ABV UP PARAM F/U: HCPCS | Performed by: OTOLARYNGOLOGY

## 2025-07-14 PROCEDURE — 92557 COMPREHENSIVE HEARING TEST: CPT

## 2025-07-14 PROCEDURE — 1036F TOBACCO NON-USER: CPT | Performed by: OTOLARYNGOLOGY

## 2025-07-14 PROCEDURE — 99203 OFFICE O/P NEW LOW 30 MIN: CPT | Performed by: OTOLARYNGOLOGY

## 2025-07-14 PROCEDURE — G8427 DOCREV CUR MEDS BY ELIG CLIN: HCPCS | Performed by: OTOLARYNGOLOGY

## 2025-07-14 PROCEDURE — 92567 TYMPANOMETRY: CPT

## 2025-07-14 RX ORDER — GUAIFENESIN, PSEUDOEPHEDRINE HYDROCHLORIDE 600; 60 MG/1; MG/1
1 TABLET, EXTENDED RELEASE ORAL EVERY 12 HOURS
Qty: 14 TABLET | Refills: 0 | Status: SHIPPED | OUTPATIENT
Start: 2025-07-14 | End: 2025-07-16

## 2025-07-14 NOTE — PROGRESS NOTES
OTOLARYNGOLOGY- HEAD & NECK SURGERY    CONSULTATION      Anthony Bradford (:  1990) is a 35 y.o. female, New patient, who presents to [facility] for clinic evaluation.    CHIEF COMPLAINT(S):  New Patient (Pt reports some pressure in ears. Some congestion/sinus issues )    SUMMARY AND DISCUSSION:     ASSESSMENTS:  Assessment & Plan  1. Chronic sinonasal issues.  Symptoms: sinus pressure, ear pressure, fluid in ears, postnasal drip, throat irritation, intermittent headaches. Likely exacerbated by allergies and work exposure. History of recurrent sinus infections, multiple courses of steroids and antibiotics. Recommend daily Flonase, 2 sprays each side, saline washes with NeilMed saline rinse daily with distilled water. Prescribe Augmentin for 3 weeks with food, suggest probiotic for GI side effects. Prescribe Mucinex D, 1 tablet every 12 hours for up to 7 days. Order CT scan of sinuses in a month.    2. Allergies.  History of seasonal allergies, pet dander, mold, and dust allergies. Advise revisiting allergist for allergy shots assessment and immunoglobulin evaluation.    3. Asthma.  History of asthma, uses albuterol inhaler PRN. Advise seeing pulmonary doctor for possible asthma flare-up and inhaler reassessment.    ORDERS/ CARE COORDINATION  Assessment & Plan  Tinnitus, unspecified laterality        Orders:    Ambulatory referral to Audiology    Other acute recurrent sinusitis        Orders:    CT SINUS FOR IMAGE GUIDANCE; Future    pseudoephedrine-guaiFENesin (MUCINEX D)  MG per extended release tablet; Take 1 tablet by mouth in the morning and 1 tablet in the evening. Do all this for 7 days.    amoxicillin-clavulanate (AUGMENTIN) 875-125 MG per tablet; Take 1 tablet by mouth 2 times daily for 21 days Take with food    Allergic rhinitis, unspecified seasonality, unspecified trigger       Orders:    pseudoephedrine-guaiFENesin (MUCINEX D)  MG per extended release tablet; Take 1 tablet

## 2025-07-16 PROBLEM — H93.19 TINNITUS: Status: ACTIVE | Noted: 2025-07-16

## 2025-07-16 PROBLEM — J30.9 ALLERGIC RHINITIS: Status: ACTIVE | Noted: 2023-12-12

## 2025-07-16 PROBLEM — J01.81 OTHER ACUTE RECURRENT SINUSITIS: Status: ACTIVE | Noted: 2025-07-16

## 2025-07-16 PROBLEM — J45.909 UNCOMPLICATED ASTHMA: Status: ACTIVE | Noted: 2025-07-16

## 2025-07-16 RX ORDER — GUAIFENESIN, PSEUDOEPHEDRINE HYDROCHLORIDE 600; 60 MG/1; MG/1
1 TABLET, EXTENDED RELEASE ORAL EVERY 12 HOURS
Qty: 14 TABLET | Refills: 0 | Status: SHIPPED | OUTPATIENT
Start: 2025-07-16 | End: 2025-07-23

## 2025-07-16 NOTE — ASSESSMENT & PLAN NOTE
Orders:    pseudoephedrine-guaiFENesin (MUCINEX D)  MG per extended release tablet; Take 1 tablet by mouth in the morning and 1 tablet in the evening. Do all this for 7 days.

## 2025-07-16 NOTE — ASSESSMENT & PLAN NOTE
Orders:    CT SINUS FOR IMAGE GUIDANCE; Future    pseudoephedrine-guaiFENesin (MUCINEX D)  MG per extended release tablet; Take 1 tablet by mouth in the morning and 1 tablet in the evening. Do all this for 7 days.    amoxicillin-clavulanate (AUGMENTIN) 875-125 MG per tablet; Take 1 tablet by mouth 2 times daily for 21 days Take with food

## 2025-07-21 RX ORDER — ALBUTEROL SULFATE 90 UG/1
INHALANT RESPIRATORY (INHALATION)
Qty: 18 G | Refills: 0 | Status: SHIPPED | OUTPATIENT
Start: 2025-07-21

## 2025-07-21 ASSESSMENT — ANXIETY QUESTIONNAIRES
3. WORRYING TOO MUCH ABOUT DIFFERENT THINGS: SEVERAL DAYS
7. FEELING AFRAID AS IF SOMETHING AWFUL MIGHT HAPPEN: NOT AT ALL
4. TROUBLE RELAXING: SEVERAL DAYS
6. BECOMING EASILY ANNOYED OR IRRITABLE: NOT AT ALL
GAD7 TOTAL SCORE: 2
5. BEING SO RESTLESS THAT IT IS HARD TO SIT STILL: NOT AT ALL
3. WORRYING TOO MUCH ABOUT DIFFERENT THINGS: SEVERAL DAYS
IF YOU CHECKED OFF ANY PROBLEMS ON THIS QUESTIONNAIRE, HOW DIFFICULT HAVE THESE PROBLEMS MADE IT FOR YOU TO DO YOUR WORK, TAKE CARE OF THINGS AT HOME, OR GET ALONG WITH OTHER PEOPLE: SOMEWHAT DIFFICULT
4. TROUBLE RELAXING: SEVERAL DAYS
6. BECOMING EASILY ANNOYED OR IRRITABLE: NOT AT ALL
1. FEELING NERVOUS, ANXIOUS, OR ON EDGE: NOT AT ALL
IF YOU CHECKED OFF ANY PROBLEMS ON THIS QUESTIONNAIRE, HOW DIFFICULT HAVE THESE PROBLEMS MADE IT FOR YOU TO DO YOUR WORK, TAKE CARE OF THINGS AT HOME, OR GET ALONG WITH OTHER PEOPLE: SOMEWHAT DIFFICULT
1. FEELING NERVOUS, ANXIOUS, OR ON EDGE: NOT AT ALL
2. NOT BEING ABLE TO STOP OR CONTROL WORRYING: NOT AT ALL
5. BEING SO RESTLESS THAT IT IS HARD TO SIT STILL: NOT AT ALL
7. FEELING AFRAID AS IF SOMETHING AWFUL MIGHT HAPPEN: NOT AT ALL
2. NOT BEING ABLE TO STOP OR CONTROL WORRYING: NOT AT ALL

## 2025-07-21 ASSESSMENT — PATIENT HEALTH QUESTIONNAIRE - PHQ9
3. TROUBLE FALLING OR STAYING ASLEEP: NOT AT ALL
SUM OF ALL RESPONSES TO PHQ QUESTIONS 1-9: 0
SUM OF ALL RESPONSES TO PHQ QUESTIONS 1-9: 0
9. THOUGHTS THAT YOU WOULD BE BETTER OFF DEAD, OR OF HURTING YOURSELF: NOT AT ALL
4. FEELING TIRED OR HAVING LITTLE ENERGY: NOT AT ALL
1. LITTLE INTEREST OR PLEASURE IN DOING THINGS: NOT AT ALL
8. MOVING OR SPEAKING SO SLOWLY THAT OTHER PEOPLE COULD HAVE NOTICED. OR THE OPPOSITE, BEING SO FIGETY OR RESTLESS THAT YOU HAVE BEEN MOVING AROUND A LOT MORE THAN USUAL: NOT AT ALL
2. FEELING DOWN, DEPRESSED OR HOPELESS: NOT AT ALL
6. FEELING BAD ABOUT YOURSELF - OR THAT YOU ARE A FAILURE OR HAVE LET YOURSELF OR YOUR FAMILY DOWN: NOT AT ALL
SUM OF ALL RESPONSES TO PHQ QUESTIONS 1-9: 0
9. THOUGHTS THAT YOU WOULD BE BETTER OFF DEAD, OR OF HURTING YOURSELF: NOT AT ALL
7. TROUBLE CONCENTRATING ON THINGS, SUCH AS READING THE NEWSPAPER OR WATCHING TELEVISION: NOT AT ALL
7. TROUBLE CONCENTRATING ON THINGS, SUCH AS READING THE NEWSPAPER OR WATCHING TELEVISION: NOT AT ALL
1. LITTLE INTEREST OR PLEASURE IN DOING THINGS: NOT AT ALL
6. FEELING BAD ABOUT YOURSELF - OR THAT YOU ARE A FAILURE OR HAVE LET YOURSELF OR YOUR FAMILY DOWN: NOT AT ALL
SUM OF ALL RESPONSES TO PHQ QUESTIONS 1-9: 0
2. FEELING DOWN, DEPRESSED OR HOPELESS: NOT AT ALL
4. FEELING TIRED OR HAVING LITTLE ENERGY: NOT AT ALL
SUM OF ALL RESPONSES TO PHQ QUESTIONS 1-9: 0
10. IF YOU CHECKED OFF ANY PROBLEMS, HOW DIFFICULT HAVE THESE PROBLEMS MADE IT FOR YOU TO DO YOUR WORK, TAKE CARE OF THINGS AT HOME, OR GET ALONG WITH OTHER PEOPLE: NOT DIFFICULT AT ALL
5. POOR APPETITE OR OVEREATING: NOT AT ALL
10. IF YOU CHECKED OFF ANY PROBLEMS, HOW DIFFICULT HAVE THESE PROBLEMS MADE IT FOR YOU TO DO YOUR WORK, TAKE CARE OF THINGS AT HOME, OR GET ALONG WITH OTHER PEOPLE: NOT DIFFICULT AT ALL
3. TROUBLE FALLING OR STAYING ASLEEP: NOT AT ALL
8. MOVING OR SPEAKING SO SLOWLY THAT OTHER PEOPLE COULD HAVE NOTICED. OR THE OPPOSITE - BEING SO FIDGETY OR RESTLESS THAT YOU HAVE BEEN MOVING AROUND A LOT MORE THAN USUAL: NOT AT ALL
5. POOR APPETITE OR OVEREATING: NOT AT ALL

## 2025-07-21 NOTE — TELEPHONE ENCOUNTER
Medication:   Requested Prescriptions     Pending Prescriptions Disp Refills    albuterol sulfate HFA (PROVENTIL;VENTOLIN;PROAIR) 108 (90 Base) MCG/ACT inhaler [Pharmacy Med Name: ALBUTEROL HFA INH (200 PUFFS) 18GM] 18 g 0     Sig: INHALE 2 PUFFS INTO THE LUNGS EVERY 4 HOURS AS NEEDED FOR WHEEZING OR COUGH        Last Filled:  07/03/2025 #18 g 0 refill    Patient Phone Number: [phone] (home)     Last appt: 7/2/2025   Next appt: 8/4/2025    Last OARRS:        No data to display

## 2025-07-22 ENCOUNTER — TELEMEDICINE (OUTPATIENT)
Dept: PSYCHOLOGY | Age: 35
End: 2025-07-22
Payer: COMMERCIAL

## 2025-07-22 DIAGNOSIS — F41.1 GENERALIZED ANXIETY DISORDER: Primary | ICD-10-CM

## 2025-07-22 DIAGNOSIS — F33.0 MILD EPISODE OF RECURRENT MAJOR DEPRESSIVE DISORDER: ICD-10-CM

## 2025-07-22 PROCEDURE — 90832 PSYTX W PT 30 MINUTES: CPT | Performed by: PSYCHOLOGIST

## 2025-08-04 ASSESSMENT — PATIENT HEALTH QUESTIONNAIRE - PHQ9
8. MOVING OR SPEAKING SO SLOWLY THAT OTHER PEOPLE COULD HAVE NOTICED. OR THE OPPOSITE, BEING SO FIGETY OR RESTLESS THAT YOU HAVE BEEN MOVING AROUND A LOT MORE THAN USUAL: NOT AT ALL
1. LITTLE INTEREST OR PLEASURE IN DOING THINGS: NOT AT ALL
SUM OF ALL RESPONSES TO PHQ QUESTIONS 1-9: 0
SUM OF ALL RESPONSES TO PHQ QUESTIONS 1-9: 0
1. LITTLE INTEREST OR PLEASURE IN DOING THINGS: NOT AT ALL
8. MOVING OR SPEAKING SO SLOWLY THAT OTHER PEOPLE COULD HAVE NOTICED. OR THE OPPOSITE - BEING SO FIDGETY OR RESTLESS THAT YOU HAVE BEEN MOVING AROUND A LOT MORE THAN USUAL: NOT AT ALL
6. FEELING BAD ABOUT YOURSELF - OR THAT YOU ARE A FAILURE OR HAVE LET YOURSELF OR YOUR FAMILY DOWN: NOT AT ALL
3. TROUBLE FALLING OR STAYING ASLEEP: NOT AT ALL
4. FEELING TIRED OR HAVING LITTLE ENERGY: NOT AT ALL
6. FEELING BAD ABOUT YOURSELF - OR THAT YOU ARE A FAILURE OR HAVE LET YOURSELF OR YOUR FAMILY DOWN: NOT AT ALL
SUM OF ALL RESPONSES TO PHQ QUESTIONS 1-9: 0
2. FEELING DOWN, DEPRESSED OR HOPELESS: NOT AT ALL
SUM OF ALL RESPONSES TO PHQ QUESTIONS 1-9: 0
10. IF YOU CHECKED OFF ANY PROBLEMS, HOW DIFFICULT HAVE THESE PROBLEMS MADE IT FOR YOU TO DO YOUR WORK, TAKE CARE OF THINGS AT HOME, OR GET ALONG WITH OTHER PEOPLE: NOT DIFFICULT AT ALL
2. FEELING DOWN, DEPRESSED OR HOPELESS: NOT AT ALL
9. THOUGHTS THAT YOU WOULD BE BETTER OFF DEAD, OR OF HURTING YOURSELF: NOT AT ALL
5. POOR APPETITE OR OVEREATING: NOT AT ALL
9. THOUGHTS THAT YOU WOULD BE BETTER OFF DEAD, OR OF HURTING YOURSELF: NOT AT ALL
10. IF YOU CHECKED OFF ANY PROBLEMS, HOW DIFFICULT HAVE THESE PROBLEMS MADE IT FOR YOU TO DO YOUR WORK, TAKE CARE OF THINGS AT HOME, OR GET ALONG WITH OTHER PEOPLE: NOT DIFFICULT AT ALL
3. TROUBLE FALLING OR STAYING ASLEEP: NOT AT ALL
7. TROUBLE CONCENTRATING ON THINGS, SUCH AS READING THE NEWSPAPER OR WATCHING TELEVISION: NOT AT ALL
7. TROUBLE CONCENTRATING ON THINGS, SUCH AS READING THE NEWSPAPER OR WATCHING TELEVISION: NOT AT ALL
5. POOR APPETITE OR OVEREATING: NOT AT ALL
4. FEELING TIRED OR HAVING LITTLE ENERGY: NOT AT ALL
SUM OF ALL RESPONSES TO PHQ QUESTIONS 1-9: 0

## 2025-08-05 ENCOUNTER — TELEMEDICINE (OUTPATIENT)
Dept: PSYCHOLOGY | Age: 35
End: 2025-08-05
Payer: COMMERCIAL

## 2025-08-05 DIAGNOSIS — F33.0 MILD EPISODE OF RECURRENT MAJOR DEPRESSIVE DISORDER: ICD-10-CM

## 2025-08-05 DIAGNOSIS — F41.1 GENERALIZED ANXIETY DISORDER: Primary | ICD-10-CM

## 2025-08-05 PROCEDURE — 90832 PSYTX W PT 30 MINUTES: CPT | Performed by: PSYCHOLOGIST

## 2025-08-07 RX ORDER — ALBUTEROL SULFATE 90 UG/1
INHALANT RESPIRATORY (INHALATION)
Qty: 18 G | Refills: 0 | Status: SHIPPED | OUTPATIENT
Start: 2025-08-07

## 2025-08-14 ENCOUNTER — OFFICE VISIT (OUTPATIENT)
Dept: PRIMARY CARE CLINIC | Age: 35
End: 2025-08-14
Payer: COMMERCIAL

## 2025-08-14 ENCOUNTER — HOSPITAL ENCOUNTER (OUTPATIENT)
Dept: CT IMAGING | Age: 35
Discharge: HOME | End: 2025-08-14
Attending: OTOLARYNGOLOGY
Payer: COMMERCIAL

## 2025-08-14 VITALS
TEMPERATURE: 98.6 F | BODY MASS INDEX: 37.43 KG/M2 | OXYGEN SATURATION: 97 % | DIASTOLIC BLOOD PRESSURE: 79 MMHG | WEIGHT: 238.5 LBS | RESPIRATION RATE: 16 BRPM | HEIGHT: 67 IN | SYSTOLIC BLOOD PRESSURE: 121 MMHG | HEART RATE: 73 BPM

## 2025-08-14 DIAGNOSIS — E66.09 CLASS 2 OBESITY DUE TO EXCESS CALORIES WITHOUT SERIOUS COMORBIDITY WITH BODY MASS INDEX (BMI) OF 36.0 TO 36.9 IN ADULT: Primary | ICD-10-CM

## 2025-08-14 DIAGNOSIS — H92.01 RIGHT EAR PAIN: ICD-10-CM

## 2025-08-14 DIAGNOSIS — J01.81 OTHER ACUTE RECURRENT SINUSITIS: ICD-10-CM

## 2025-08-14 DIAGNOSIS — F41.9 ANXIETY: Chronic | ICD-10-CM

## 2025-08-14 DIAGNOSIS — E66.812 CLASS 2 OBESITY DUE TO EXCESS CALORIES WITHOUT SERIOUS COMORBIDITY WITH BODY MASS INDEX (BMI) OF 36.0 TO 36.9 IN ADULT: Primary | ICD-10-CM

## 2025-08-14 PROCEDURE — G8427 DOCREV CUR MEDS BY ELIG CLIN: HCPCS | Performed by: FAMILY MEDICINE

## 2025-08-14 PROCEDURE — 70486 CT MAXILLOFACIAL W/O DYE: CPT

## 2025-08-14 PROCEDURE — 99214 OFFICE O/P EST MOD 30 MIN: CPT | Performed by: FAMILY MEDICINE

## 2025-08-14 PROCEDURE — G8417 CALC BMI ABV UP PARAM F/U: HCPCS | Performed by: FAMILY MEDICINE

## 2025-08-14 PROCEDURE — 1036F TOBACCO NON-USER: CPT | Performed by: FAMILY MEDICINE

## 2025-08-14 RX ORDER — PHENTERMINE HYDROCHLORIDE 37.5 MG/1
37.5 TABLET ORAL
Qty: 30 TABLET | Refills: 0 | Status: SHIPPED | OUTPATIENT
Start: 2025-08-14 | End: 2025-09-13

## 2025-08-14 ASSESSMENT — ENCOUNTER SYMPTOMS
CONSTIPATION: 0
CHEST TIGHTNESS: 0
ABDOMINAL PAIN: 0
RHINORRHEA: 0
DIARRHEA: 0
SHORTNESS OF BREATH: 0
SORE THROAT: 0

## 2025-08-18 ENCOUNTER — TELEMEDICINE (OUTPATIENT)
Dept: PSYCHOLOGY | Age: 35
End: 2025-08-18
Payer: COMMERCIAL

## 2025-08-18 DIAGNOSIS — F33.0 MILD EPISODE OF RECURRENT MAJOR DEPRESSIVE DISORDER: ICD-10-CM

## 2025-08-18 DIAGNOSIS — F41.1 GENERALIZED ANXIETY DISORDER: Primary | ICD-10-CM

## 2025-08-18 PROCEDURE — 90832 PSYTX W PT 30 MINUTES: CPT | Performed by: PSYCHOLOGIST

## 2025-08-18 ASSESSMENT — PATIENT HEALTH QUESTIONNAIRE - PHQ9
3. TROUBLE FALLING OR STAYING ASLEEP: NOT AT ALL
1. LITTLE INTEREST OR PLEASURE IN DOING THINGS: NOT AT ALL
SUM OF ALL RESPONSES TO PHQ QUESTIONS 1-9: 6
1. LITTLE INTEREST OR PLEASURE IN DOING THINGS: NOT AT ALL
9. THOUGHTS THAT YOU WOULD BE BETTER OFF DEAD, OR OF HURTING YOURSELF: NOT AT ALL
8. MOVING OR SPEAKING SO SLOWLY THAT OTHER PEOPLE COULD HAVE NOTICED. OR THE OPPOSITE - BEING SO FIDGETY OR RESTLESS THAT YOU HAVE BEEN MOVING AROUND A LOT MORE THAN USUAL: SEVERAL DAYS
6. FEELING BAD ABOUT YOURSELF - OR THAT YOU ARE A FAILURE OR HAVE LET YOURSELF OR YOUR FAMILY DOWN: SEVERAL DAYS
6. FEELING BAD ABOUT YOURSELF - OR THAT YOU ARE A FAILURE OR HAVE LET YOURSELF OR YOUR FAMILY DOWN: SEVERAL DAYS
10. IF YOU CHECKED OFF ANY PROBLEMS, HOW DIFFICULT HAVE THESE PROBLEMS MADE IT FOR YOU TO DO YOUR WORK, TAKE CARE OF THINGS AT HOME, OR GET ALONG WITH OTHER PEOPLE: SOMEWHAT DIFFICULT
3. TROUBLE FALLING OR STAYING ASLEEP: NOT AT ALL
5. POOR APPETITE OR OVEREATING: SEVERAL DAYS
4. FEELING TIRED OR HAVING LITTLE ENERGY: SEVERAL DAYS
SUM OF ALL RESPONSES TO PHQ QUESTIONS 1-9: 6
7. TROUBLE CONCENTRATING ON THINGS, SUCH AS READING THE NEWSPAPER OR WATCHING TELEVISION: SEVERAL DAYS
SUM OF ALL RESPONSES TO PHQ QUESTIONS 1-9: 6
SUM OF ALL RESPONSES TO PHQ QUESTIONS 1-9: 6
10. IF YOU CHECKED OFF ANY PROBLEMS, HOW DIFFICULT HAVE THESE PROBLEMS MADE IT FOR YOU TO DO YOUR WORK, TAKE CARE OF THINGS AT HOME, OR GET ALONG WITH OTHER PEOPLE: SOMEWHAT DIFFICULT
8. MOVING OR SPEAKING SO SLOWLY THAT OTHER PEOPLE COULD HAVE NOTICED. OR THE OPPOSITE, BEING SO FIGETY OR RESTLESS THAT YOU HAVE BEEN MOVING AROUND A LOT MORE THAN USUAL: SEVERAL DAYS
7. TROUBLE CONCENTRATING ON THINGS, SUCH AS READING THE NEWSPAPER OR WATCHING TELEVISION: SEVERAL DAYS
4. FEELING TIRED OR HAVING LITTLE ENERGY: SEVERAL DAYS
SUM OF ALL RESPONSES TO PHQ QUESTIONS 1-9: 6
2. FEELING DOWN, DEPRESSED OR HOPELESS: SEVERAL DAYS
9. THOUGHTS THAT YOU WOULD BE BETTER OFF DEAD, OR OF HURTING YOURSELF: NOT AT ALL
2. FEELING DOWN, DEPRESSED OR HOPELESS: SEVERAL DAYS
5. POOR APPETITE OR OVEREATING: SEVERAL DAYS

## 2025-08-18 ASSESSMENT — ANXIETY QUESTIONNAIRES
6. BECOMING EASILY ANNOYED OR IRRITABLE: SEVERAL DAYS
GAD7 TOTAL SCORE: 9
4. TROUBLE RELAXING: SEVERAL DAYS
IF YOU CHECKED OFF ANY PROBLEMS ON THIS QUESTIONNAIRE, HOW DIFFICULT HAVE THESE PROBLEMS MADE IT FOR YOU TO DO YOUR WORK, TAKE CARE OF THINGS AT HOME, OR GET ALONG WITH OTHER PEOPLE: SOMEWHAT DIFFICULT
7. FEELING AFRAID AS IF SOMETHING AWFUL MIGHT HAPPEN: SEVERAL DAYS
5. BEING SO RESTLESS THAT IT IS HARD TO SIT STILL: NOT AT ALL
7. FEELING AFRAID AS IF SOMETHING AWFUL MIGHT HAPPEN: SEVERAL DAYS
3. WORRYING TOO MUCH ABOUT DIFFERENT THINGS: NEARLY EVERY DAY
6. BECOMING EASILY ANNOYED OR IRRITABLE: SEVERAL DAYS
2. NOT BEING ABLE TO STOP OR CONTROL WORRYING: MORE THAN HALF THE DAYS
IF YOU CHECKED OFF ANY PROBLEMS ON THIS QUESTIONNAIRE, HOW DIFFICULT HAVE THESE PROBLEMS MADE IT FOR YOU TO DO YOUR WORK, TAKE CARE OF THINGS AT HOME, OR GET ALONG WITH OTHER PEOPLE: SOMEWHAT DIFFICULT
5. BEING SO RESTLESS THAT IT IS HARD TO SIT STILL: NOT AT ALL
4. TROUBLE RELAXING: SEVERAL DAYS
2. NOT BEING ABLE TO STOP OR CONTROL WORRYING: MORE THAN HALF THE DAYS
3. WORRYING TOO MUCH ABOUT DIFFERENT THINGS: NEARLY EVERY DAY
1. FEELING NERVOUS, ANXIOUS, OR ON EDGE: SEVERAL DAYS
1. FEELING NERVOUS, ANXIOUS, OR ON EDGE: SEVERAL DAYS

## 2025-08-21 RX ORDER — ALBUTEROL SULFATE 90 UG/1
INHALANT RESPIRATORY (INHALATION)
Qty: 18 G | Refills: 0 | OUTPATIENT
Start: 2025-08-21

## 2025-08-25 ENCOUNTER — OFFICE VISIT (OUTPATIENT)
Age: 35
End: 2025-08-25
Payer: COMMERCIAL

## 2025-08-25 VITALS
HEART RATE: 87 BPM | SYSTOLIC BLOOD PRESSURE: 130 MMHG | DIASTOLIC BLOOD PRESSURE: 88 MMHG | TEMPERATURE: 97.4 F | OXYGEN SATURATION: 99 %

## 2025-08-25 DIAGNOSIS — M25.50 ARTHRALGIA, UNSPECIFIED JOINT: ICD-10-CM

## 2025-08-25 DIAGNOSIS — J01.00 ACUTE NON-RECURRENT MAXILLARY SINUSITIS: ICD-10-CM

## 2025-08-25 DIAGNOSIS — J30.9 ALLERGIC RHINITIS, UNSPECIFIED SEASONALITY, UNSPECIFIED TRIGGER: Primary | ICD-10-CM

## 2025-08-25 DIAGNOSIS — J30.9 ALLERGIC RHINITIS, UNSPECIFIED SEASONALITY, UNSPECIFIED TRIGGER: ICD-10-CM

## 2025-08-25 DIAGNOSIS — M54.2 CERVICALGIA: ICD-10-CM

## 2025-08-25 LAB
BASOPHILS # BLD: 0 K/UL (ref 0–0.2)
BASOPHILS NFR BLD: 0.6 %
CRP SERPL-MCNC: 9 MG/L (ref 0–5.1)
DEPRECATED RDW RBC AUTO: 13 % (ref 12.4–15.4)
EOSINOPHIL # BLD: 0.1 K/UL (ref 0–0.6)
EOSINOPHIL NFR BLD: 0.9 %
ERYTHROCYTE [SEDIMENTATION RATE] IN BLOOD BY WESTERGREN METHOD: 32 MM/HR (ref 0–20)
HCT VFR BLD AUTO: 39.7 % (ref 36–48)
HGB BLD-MCNC: 13 G/DL (ref 12–16)
IGA SERPL-MCNC: 180 MG/DL (ref 70–400)
IGG SERPL-MCNC: 1484 MG/DL (ref 700–1600)
IGM SERPL-MCNC: 119 MG/DL (ref 40–230)
LYMPHOCYTES # BLD: 1.4 K/UL (ref 1–5.1)
LYMPHOCYTES NFR BLD: 19.3 %
MCH RBC QN AUTO: 28.6 PG (ref 26–34)
MCHC RBC AUTO-ENTMCNC: 32.9 G/DL (ref 31–36)
MCV RBC AUTO: 87.1 FL (ref 80–100)
MONOCYTES # BLD: 0.4 K/UL (ref 0–1.3)
MONOCYTES NFR BLD: 5.1 %
NEUTROPHILS # BLD: 5.2 K/UL (ref 1.7–7.7)
NEUTROPHILS NFR BLD: 74.1 %
PLATELET # BLD AUTO: 266 K/UL (ref 135–450)
PMV BLD AUTO: 9.9 FL (ref 5–10.5)
RBC # BLD AUTO: 4.56 M/UL (ref 4–5.2)
RHEUMATOID FACT SER IA-ACNC: <10 IU/ML
WBC # BLD AUTO: 7 K/UL (ref 4–11)

## 2025-08-25 PROCEDURE — G8417 CALC BMI ABV UP PARAM F/U: HCPCS | Performed by: OTOLARYNGOLOGY

## 2025-08-25 PROCEDURE — 99214 OFFICE O/P EST MOD 30 MIN: CPT | Performed by: OTOLARYNGOLOGY

## 2025-08-25 PROCEDURE — 1036F TOBACCO NON-USER: CPT | Performed by: OTOLARYNGOLOGY

## 2025-08-25 PROCEDURE — G8427 DOCREV CUR MEDS BY ELIG CLIN: HCPCS | Performed by: OTOLARYNGOLOGY

## 2025-08-26 ENCOUNTER — TELEPHONE (OUTPATIENT)
Dept: PRIMARY CARE CLINIC | Age: 35
End: 2025-08-26

## 2025-08-26 ENCOUNTER — TELEPHONE (OUTPATIENT)
Dept: ENT CLINIC | Age: 35
End: 2025-08-26

## 2025-08-26 PROBLEM — M25.50 ARTHRALGIA: Status: ACTIVE | Noted: 2025-08-26

## 2025-08-26 PROBLEM — M54.2 CERVICALGIA: Status: ACTIVE | Noted: 2025-08-26

## 2025-08-26 PROBLEM — J01.00 ACUTE NON-RECURRENT MAXILLARY SINUSITIS: Status: ACTIVE | Noted: 2025-08-26

## 2025-08-26 LAB — ANA SER QL IA: NEGATIVE

## 2025-08-27 ENCOUNTER — TELEPHONE (OUTPATIENT)
Dept: PRIMARY CARE CLINIC | Age: 35
End: 2025-08-27

## 2025-08-27 DIAGNOSIS — R70.0 ELEVATED ERYTHROCYTE SEDIMENTATION RATE: Primary | ICD-10-CM

## 2025-08-27 LAB — MISCELLANEOUS LAB TEST ORDER: NORMAL
